# Patient Record
Sex: FEMALE | Race: BLACK OR AFRICAN AMERICAN | NOT HISPANIC OR LATINO | Employment: FULL TIME | URBAN - METROPOLITAN AREA
[De-identification: names, ages, dates, MRNs, and addresses within clinical notes are randomized per-mention and may not be internally consistent; named-entity substitution may affect disease eponyms.]

---

## 2018-06-27 ENCOUNTER — HOSPITAL ENCOUNTER (EMERGENCY)
Facility: HOSPITAL | Age: 28
Discharge: HOME | End: 2018-06-27
Attending: EMERGENCY MEDICINE | Admitting: EMERGENCY MEDICINE
Payer: COMMERCIAL

## 2018-06-27 VITALS
WEIGHT: 223.6 LBS | OXYGEN SATURATION: 98 % | DIASTOLIC BLOOD PRESSURE: 67 MMHG | BODY MASS INDEX: 35.93 KG/M2 | RESPIRATION RATE: 18 BRPM | HEART RATE: 66 BPM | TEMPERATURE: 97.2 F | SYSTOLIC BLOOD PRESSURE: 108 MMHG | HEIGHT: 66 IN

## 2018-06-27 DIAGNOSIS — R19.7 DIARRHEA: ICD-10-CM

## 2018-06-27 DIAGNOSIS — R11.10 VOMITING: Primary | ICD-10-CM

## 2018-06-27 LAB
ALBUMIN SERPL BCP-MCNC: 4.1 G/DL (ref 3–5.2)
ALP SERPL-CCNC: 77 U/L (ref 43–122)
ALT SERPL W P-5'-P-CCNC: 44 U/L (ref 9–52)
AMORPH URATE CRY URNS QL MICRO: ABNORMAL /HPF
ANION GAP SERPL CALCULATED.3IONS-SCNC: 7 MMOL/L (ref 5–14)
ANISOCYTOSIS BLD QL SMEAR: PRESENT
AST SERPL W P-5'-P-CCNC: 37 U/L (ref 14–36)
BACTERIA UR QL AUTO: ABNORMAL /HPF
BASOPHILS # BLD AUTO: 0 THOUSANDS/ΜL (ref 0–0.1)
BASOPHILS NFR BLD AUTO: 0 % (ref 0–1)
BILIRUB SERPL-MCNC: 0.3 MG/DL
BILIRUB UR QL STRIP: NEGATIVE
BUN SERPL-MCNC: 9 MG/DL (ref 5–25)
CALCIUM SERPL-MCNC: 8.6 MG/DL (ref 8.4–10.2)
CHLORIDE SERPL-SCNC: 107 MMOL/L (ref 97–108)
CLARITY UR: ABNORMAL
CO2 SERPL-SCNC: 26 MMOL/L (ref 22–30)
COLOR UR: ABNORMAL
CREAT SERPL-MCNC: 0.68 MG/DL (ref 0.6–1.2)
EOSINOPHIL # BLD AUTO: 0.1 THOUSAND/ΜL (ref 0–0.4)
EOSINOPHIL NFR BLD AUTO: 1 % (ref 0–6)
ERYTHROCYTE [DISTWIDTH] IN BLOOD BY AUTOMATED COUNT: 18.4 %
EXT PREG TEST URINE: NEGATIVE
GFR SERPL CREATININE-BSD FRML MDRD: 136 ML/MIN/1.73SQ M
GLUCOSE SERPL-MCNC: 90 MG/DL (ref 70–99)
GLUCOSE UR STRIP-MCNC: NEGATIVE MG/DL
HCT VFR BLD AUTO: 35.4 % (ref 36–46)
HGB BLD-MCNC: 11.1 G/DL (ref 12–16)
HGB UR QL STRIP.AUTO: NEGATIVE
HYPERCHROMIA BLD QL SMEAR: PRESENT
KETONES UR STRIP-MCNC: NEGATIVE MG/DL
LEUKOCYTE ESTERASE UR QL STRIP: NEGATIVE
LYMPHOCYTES # BLD AUTO: 2 THOUSANDS/ΜL (ref 0.5–4)
LYMPHOCYTES NFR BLD AUTO: 31 % (ref 20–50)
MCH RBC QN AUTO: 20.7 PG (ref 26–34)
MCHC RBC AUTO-ENTMCNC: 31.2 G/DL (ref 31–36)
MCV RBC AUTO: 66 FL (ref 80–100)
MONOCYTES # BLD AUTO: 0.7 THOUSAND/ΜL (ref 0.2–0.9)
MONOCYTES NFR BLD AUTO: 11 % (ref 1–10)
MUCOUS THREADS UR QL AUTO: ABNORMAL
NEUTROPHILS # BLD AUTO: 3.6 THOUSANDS/ΜL (ref 1.8–7.8)
NEUTS SEG NFR BLD AUTO: 57 % (ref 45–65)
NITRITE UR QL STRIP: NEGATIVE
NON-SQ EPI CELLS URNS QL MICRO: ABNORMAL /HPF
PH UR STRIP.AUTO: 6 [PH] (ref 4.5–8)
PLATELET # BLD AUTO: 491 THOUSANDS/UL (ref 150–450)
PLATELET BLD QL SMEAR: ADEQUATE
PMV BLD AUTO: 7.1 FL (ref 8.9–12.7)
POTASSIUM SERPL-SCNC: 3.7 MMOL/L (ref 3.6–5)
PROT SERPL-MCNC: 7.7 G/DL (ref 5.9–8.4)
PROT UR STRIP-MCNC: ABNORMAL MG/DL
RBC # BLD AUTO: 5.35 MILLION/UL (ref 4–5.2)
RBC #/AREA URNS AUTO: ABNORMAL /HPF
RBC MORPH BLD: PRESENT
SODIUM SERPL-SCNC: 140 MMOL/L (ref 137–147)
SP GR UR STRIP.AUTO: 1.02 (ref 1–1.04)
UROBILINOGEN UA: NEGATIVE MG/DL
WBC # BLD AUTO: 6.4 THOUSAND/UL (ref 4.5–11)
WBC #/AREA URNS AUTO: ABNORMAL /HPF

## 2018-06-27 PROCEDURE — 81001 URINALYSIS AUTO W/SCOPE: CPT | Performed by: PHYSICIAN ASSISTANT

## 2018-06-27 PROCEDURE — 99283 EMERGENCY DEPT VISIT LOW MDM: CPT

## 2018-06-27 PROCEDURE — 80053 COMPREHEN METABOLIC PANEL: CPT | Performed by: PHYSICIAN ASSISTANT

## 2018-06-27 PROCEDURE — 96361 HYDRATE IV INFUSION ADD-ON: CPT

## 2018-06-27 PROCEDURE — 85025 COMPLETE CBC W/AUTO DIFF WBC: CPT | Performed by: PHYSICIAN ASSISTANT

## 2018-06-27 PROCEDURE — 36415 COLL VENOUS BLD VENIPUNCTURE: CPT | Performed by: PHYSICIAN ASSISTANT

## 2018-06-27 PROCEDURE — 96374 THER/PROPH/DIAG INJ IV PUSH: CPT

## 2018-06-27 PROCEDURE — 81025 URINE PREGNANCY TEST: CPT | Performed by: PHYSICIAN ASSISTANT

## 2018-06-27 RX ORDER — ONDANSETRON 2 MG/ML
INJECTION INTRAMUSCULAR; INTRAVENOUS
Status: COMPLETED
Start: 2018-06-27 | End: 2018-06-27

## 2018-06-27 RX ORDER — ONDANSETRON 2 MG/ML
4 INJECTION INTRAMUSCULAR; INTRAVENOUS ONCE
Status: COMPLETED | OUTPATIENT
Start: 2018-06-27 | End: 2018-06-27

## 2018-06-27 RX ORDER — DICYCLOMINE HYDROCHLORIDE 10 MG/1
20 CAPSULE ORAL ONCE
Status: COMPLETED | OUTPATIENT
Start: 2018-06-27 | End: 2018-06-27

## 2018-06-27 RX ORDER — DICYCLOMINE HYDROCHLORIDE 10 MG/1
CAPSULE ORAL
Status: COMPLETED
Start: 2018-06-27 | End: 2018-06-27

## 2018-06-27 RX ORDER — ONDANSETRON 4 MG/1
4 TABLET, ORALLY DISINTEGRATING ORAL EVERY 6 HOURS PRN
Qty: 20 TABLET | Refills: 0 | Status: SHIPPED | OUTPATIENT
Start: 2018-06-27 | End: 2018-11-23

## 2018-06-27 RX ADMIN — DICYCLOMINE HYDROCHLORIDE 20 MG: 10 CAPSULE ORAL at 12:36

## 2018-06-27 RX ADMIN — SODIUM CHLORIDE 1000 ML: 9 INJECTION, SOLUTION INTRAVENOUS at 12:33

## 2018-06-27 RX ADMIN — ONDANSETRON 4 MG: 2 INJECTION, SOLUTION INTRAMUSCULAR; INTRAVENOUS at 13:04

## 2018-06-27 RX ADMIN — ONDANSETRON 4 MG: 2 INJECTION INTRAMUSCULAR; INTRAVENOUS at 12:35

## 2018-06-27 RX ADMIN — ONDANSETRON 4 MG: 2 INJECTION, SOLUTION INTRAMUSCULAR; INTRAVENOUS at 12:35

## 2018-06-27 RX ADMIN — ONDANSETRON 4 MG: 2 INJECTION INTRAMUSCULAR; INTRAVENOUS at 13:04

## 2018-06-27 NOTE — DISCHARGE INSTRUCTIONS
Acute Nausea and Vomiting, Ambulatory Care   GENERAL INFORMATION:   Acute nausea and vomiting  starts suddenly, gets worse quickly, and lasts a short time  Nausea and vomiting may be caused by pregnancy, alcohol, infection, or medicines  Common related symptoms include the following:   · Fever    · Abdominal swelling    · Pain, tenderness, or a lump in the abdomen    · Splashing sounds heard in your stomach when you move  Seek immediate care for the following symptoms:   · Blood in your vomit or bowel movements    · Sudden, severe pain in your chest and upper abdomen after hard vomiting    · Dizziness, dry mouth, and thirst    · Urinating very little or not at all    · Muscle weakness, leg cramps, and trouble breathing    · A heart beat that is faster than normal    · Vomiting for more than 48 hours  Treatment for acute nausea and vomiting  may include medicines to calm your stomach and stop the vomiting  You may need IV fluids if you are dehydrated  Manage your nausea and vomiting:   · Drink liquids as directed to prevent dehydration  Ask how much liquid to drink each day and which liquids are best for you  You may need to drink an oral rehydration solution (ORS)  ORS contains water, salts, and sugar that are needed to replace the lost body fluids  Ask what kind of ORS to use, how much to drink, and where to get it  · Eat smaller meals, more often  Eat small amounts of food every 2 to 3 hours, even if you are not hungry  Food in your stomach may help decrease your nausea  · Avoid stress  Find ways to relax and manage your stress  Headaches due to stress may cause nausea and vomiting  Get more rest and sleep  Follow up with your healthcare provider as directed:  Write down your questions so you remember to ask them during your visits  CARE AGREEMENT:   You have the right to help plan your care  Learn about your health condition and how it may be treated   Discuss treatment options with your caregivers to decide what care you want to receive  You always have the right to refuse treatment  The above information is an  only  It is not intended as medical advice for individual conditions or treatments  Talk to your doctor, nurse or pharmacist before following any medical regimen to see if it is safe and effective for you  © 2014 [address] is for End User's use only and may not be sold, redistributed or otherwise used for commercial purposes  All illustrations and images included in CareNotes® are the copyrighted property of [employer]  or Ramses Beckham  Acute Diarrhea   WHAT YOU NEED TO KNOW:   Acute diarrhea starts quickly and lasts a short time, usually 1 to 3 days  It can last up to 2 weeks  You may not be able to control your diarrhea  Acute diarrhea usually stops on its own  DISCHARGE INSTRUCTIONS:   Return to the emergency department if:   · You feel confused  · Your heartbeat is faster than normal      · Your eyes look deeply sunken, or you have no tears when you cry  · You urinate less than usual, or your urine is dark yellow  · You have blood or mucus in your stools  · You have severe abdominal pain  · You are unable to drink any liquids  Contact your healthcare provider if:   · Your symptoms do not get better with treatment  · You have a fever higher than 101 3°F (38 5°C)  · You have trouble eating and drinking because you are vomiting  · You are thirsty or have a dry mouth  · Your diarrhea does not get better in 7 days  · You have questions or concerns about your condition or care  Follow up with your healthcare provider as directed:  Write down your questions so you remember to ask them during your visits  Medicines:  · Diarrhea medicine  is an over-the-counter medicine that helps slow or stop your diarrhea   If you take other medicines, talk to your healthcare provider before you take diarrhea medicine  · Antibiotics  may be given to help treat an infection caused by bacteria  · Antiparasitics  may be given to treat an infection caused by parasites  · Take your medicine as directed  Contact your healthcare provider if you think your medicine is not helping or if you have side effects  Tell him of her if you are allergic to any medicine  Keep a list of the medicines, vitamins, and herbs you take  Include the amounts, and when and why you take them  Bring the list or the pill bottles to follow-up visits  Carry your medicine list with you in case of an emergency  Self-care:   · Drink liquids as directed  Liquids will help prevent dehydration caused by diarrhea  Ask your healthcare provider how much liquid to drink each day and which liquids are best for you  You may need to drink an oral rehydration solution (ORS)  An ORS has the right amounts of water, salts, and sugar you need to replace body fluids  You can buy an ORS at most grocery stores and pharmacies  · Eat foods that are easy to digest   Examples include rice, lentils, cereal, bananas, potatoes, and bread  It also includes some fruits (bananas, melon), well-cooked vegetables, and lean meats  Avoid foods high in fiber, fat, and sugar  Also avoid caffeine, alcohol, dairy, and red meat until your diarrhea is gone  Prevent acute diarrhea:   · Wash your hands often  Use soap and water  Wash your hands before you eat or prepare food  Also wash your hands after you use the bathroom  Use an alcohol-based hand gel when soap and water are not available  · Keep bathroom surfaces clean  This helps prevent the spread of germs that cause acute diarrhea  · Wash fruits and vegetables well before you eat them  This can help remove germs that cause diarrhea  If possible, remove the skin from fruits and vegetables, or cook them well before you eat them  · Cook meat as directed        ¨ Cook ground meat  to 160°F      [employer] poultry, whole poultry, or cuts of poultry  to at least 165°F  Remove the meat from heat  Let it stand for 3 minutes before you eat it  ¨ Cook whole cuts of meat other than poultry  to at least 145°F  Remove the meat from heat  Let it stand for 3 minutes before you eat it  · Wash dishes that have touched raw meat with hot water and soap  This includes cutting boards, utensils, dishes, and serving containers  · Place raw or cooked meat in the refrigerator as soon as possible  Bacteria can grow in meat that is left at room temperature too long  · Do not eat raw or undercooked oysters, clams, or mussels  These foods may be contaminated and cause infection  · Drink filtered or treated water only when you travel  Do not put ice in your drinks  Drink bottled water whenever possible  © 2017 [address] Information is for End User's use only and may not be sold, redistributed or otherwise used for commercial purposes  All illustrations and images included in CareNotes® are the copyrighted property of [employer]  or Ramses Beckham  The above information is an  only  It is not intended as medical advice for individual conditions or treatments  Talk to your doctor, nurse or pharmacist before following any medical regimen to see if it is safe and effective for you

## 2018-06-27 NOTE — ED PROVIDER NOTES
History  Chief Complaint   Patient presents with    Vomiting     vomiting and diarrhea for 1 week, vomited 3 times today and nonstop diarrhea since 0600 today, c/o of fever and chills with lower abd pain bilateral with headache     26 yo F presenting with multiple episodes of vomiting and diarrhea x 1 week  She reports trying Pepto bismol and Mylanta at home without relief  She reports she has been trying to drink clear liquids and soup at home without any relief either  She reports she has 'not been able to keep anything down'  She denies any foul smell of the stool and describes it as 'very watery'  She also describes diffuse cramping in the abdomen during episodes of diarrhea  Pt denies any recent abx, new foods, new restaurants or travel  Pt denies any hemoptysis, hematemesis, hematechezia or melena  Pt denies any dysuria, hematuria, vaginal bleeding or vaginal discharge, CP or SOB, fevers chills or night sweats  She denies any abdominal surgeries  None       History reviewed  No pertinent past medical history  History reviewed  No pertinent surgical history  No family history on file  I have reviewed and agree with the history as documented  Social History   Substance Use Topics    Smoking status: Never Smoker    Smokeless tobacco: Never Used    Alcohol use Yes      Comment: socially        Review of Systems   All other systems reviewed and are negative  Physical Exam  Physical Exam   Constitutional: She is oriented to person, place, and time  She appears well-developed and well-nourished  No distress  Appears well and is talkative at bedside   HENT:   Head: Normocephalic  Eyes: Conjunctivae and EOM are normal    Neck: Normal range of motion  Neck supple  Cardiovascular: Normal rate  Pulmonary/Chest: Effort normal    Abdominal: Soft     Hyper active bowel sounds, no RLQ tenderness to palpation, no LLQ tenderness to palpation, no epigastric tenderness to palpation, no scars visualized   Musculoskeletal: Normal range of motion  Neurological: She is alert and oriented to person, place, and time  Skin: Skin is warm and dry  Capillary refill takes less than 2 seconds  She is not diaphoretic  Psychiatric: She has a normal mood and affect  Her behavior is normal    Nursing note and vitals reviewed        Vital Signs  ED Triage Vitals [06/27/18 1137]   Temperature Pulse Respirations Blood Pressure SpO2   (!) 97 2 °F (36 2 °C) 93 18 163/99 97 %      Temp Source Heart Rate Source Patient Position - Orthostatic VS BP Location FiO2 (%)   Temporal Right Sitting Left arm --      Pain Score       7           Vitals:    06/27/18 1137 06/27/18 1306   BP: 163/99 108/67   Pulse: 93 66   Patient Position - Orthostatic VS: Sitting Lying       Visual Acuity      ED Medications  Medications   ondansetron (ZOFRAN) injection 4 mg (4 mg Intravenous Given 6/27/18 1235)   sodium chloride 0 9 % bolus 1,000 mL (0 mL Intravenous Stopped 6/27/18 1309)   dicyclomine (BENTYL) capsule 20 mg (20 mg Oral Given 6/27/18 1236)   ondansetron (ZOFRAN) injection 4 mg (4 mg Intravenous Given 6/27/18 1304)       Diagnostic Studies  Results Reviewed     Procedure Component Value Units Date/Time    Urine Microscopic [[id number]]  (Abnormal) Collected:  06/27/18 1241    Lab Status:  Final result Specimen:  Urine from Urine, Other Updated:  06/27/18 1328     RBC, UA 0-1 (A) /hpf      WBC, UA 0-1 (A) /hpf      Epithelial Cells Moderate (A) /hpf      Bacteria, UA Moderate (A) /hpf      AMORPH URATES Innumerable /hpf      MUCOUS THREADS Moderate (A)    UA w Reflex to Microscopic [[id number]]  (Abnormal) Collected:  06/27/18 1241    Lab Status:  Final result Specimen:  Urine from Urine, Other Updated:  06/27/18 1302     Color, UA Diana (A)     Clarity, UA Cloudy (A)     Specific Gravity, UA 1 025     pH, UA 6 0     Leukocytes, UA Negative     Nitrite, UA Negative     Protein, UA 15 (Trace) (A) mg/dl      Glucose, UA Negative mg/dl      Ketones, UA Negative mg/dl      Bilirubin, UA Negative     Blood, UA Negative     UROBILINOGEN UA Negative mg/dL     Comprehensive metabolic panel [[id number]]  (Abnormal) Collected:  06/27/18 1215    Lab Status:  Final result Specimen:  Blood from Arm, Right Updated:  06/27/18 1247     Sodium 140 mmol/L      Potassium 3 7 mmol/L      Chloride 107 mmol/L      CO2 26 mmol/L      Anion Gap 7 mmol/L      BUN 9 mg/dL      Creatinine 0 68 mg/dL      Glucose 90 mg/dL      Calcium 8 6 mg/dL      AST 37 (H) U/L      ALT 44 U/L      Alkaline Phosphatase 77 U/L      Total Protein 7 7 g/dL      Albumin 4 1 g/dL      Total Bilirubin 0 30 mg/dL      eGFR 136 ml/min/1 73sq m     Narrative:         National Kidney Disease Education Program recommendations are as follows:  GFR calculation is accurate only with a steady state creatinine  Chronic Kidney disease less than 60 ml/min/1 73 sq  meters  Kidney failure less than 15 ml/min/1 73 sq  meters      POCT pregnancy, urine [[id number]]  (Normal) Resulted:  06/27/18 1244    Lab Status:  Final result Updated:  06/27/18 1244     EXT PREG TEST UR (Ref: Negative) Negative    CBC and differential [[id number]]  (Abnormal) Collected:  06/27/18 1215    Lab Status:  Final result Specimen:  Blood from Arm, Right Updated:  06/27/18 1234     WBC 6 40 Thousand/uL      RBC 5 35 (H) Million/uL      Hemoglobin 11 1 (L) g/dL      Hematocrit 35 4 (L) %      MCV 66 (L) fL      MCH 20 7 (L) pg      MCHC 31 2 g/dL      RDW 18 4 (H) %      MPV 7 1 (L) fL      Platelets 748 (H) Thousands/uL      Neutrophils Relative 57 %      Lymphocytes Relative 31 %      Monocytes Relative 11 (H) %      Eosinophils Relative 1 %      Basophils Relative 0 %      Neutrophils Absolute 3 60 Thousands/µL      Lymphocytes Absolute 2 00 Thousands/µL      Monocytes Absolute 0 70 Thousand/µL      Eosinophils Absolute 0 10 Thousand/µL      Basophils Absolute 0 00 Thousands/µL                  No orders to display Procedures  Procedures       Phone Contacts  ED Phone Contact    ED Course  ED Course as of Jun 27 1342   Wed Jun 27, 2018   1303 Pt nausea slightly improved after 4mg zofran, will give 4 mg more and reassess, otherwise appears healthy, nontoxic, nondiaphoretic, lying comfortably in bed    1337 Pt feeling better after second dosing of zofran, is asking to go home                                MDM  Number of Diagnoses or Management Options  Diarrhea:   Vomiting:   Diagnosis management comments: Pt improved with zofran and bentyl, no electrolyte abnormality or increased WBC, all labs discussed with pt, pt appears nontoxic and well, pt given strict RTED precautions, pt understands and is agreeable to plan, all questions answered, stable for d/c    CritCare Time    Disposition  Final diagnoses:   Vomiting   Diarrhea     Time reflects when diagnosis was documented in both MDM as applicable and the Disposition within this note     Time User Action Codes Description Comment    6/27/2018  1:40 PM Santos Vela Add [R11 10] Vomiting     6/27/2018  1:40 PM Amy BORRERO Add [R19 7] Diarrhea       ED Disposition     ED Disposition Condition Comment    Discharge  [address] discharge to home/self care      Condition at discharge: Good        Follow-up Information     Follow up With Specialties Details Why [facility]   [address] [place] [address]  [id number]  [address] [facility]   [address] [facility] [place]  [address] [address]  [phone]          Patient's Medications   Discharge Prescriptions    ONDANSETRON (ZOFRAN-ODT) 4 MG DISINTEGRATING TABLET    Take 1 tablet (4 mg total) by mouth every 6 (six) hours as needed for nausea or vomiting       Start Date: 6/27/2018 End Date: --       Order Dose: 4 mg       Quantity: 20 tablet    Refills: 0     No discharge procedures on file      ED Provider  Electronically Signed by           Tez Valentin PA-C  06/27/18 2989

## 2018-07-27 ENCOUNTER — HOSPITAL ENCOUNTER (EMERGENCY)
Facility: HOSPITAL | Age: 28
Discharge: HOME | End: 2018-07-27
Attending: EMERGENCY MEDICINE | Admitting: EMERGENCY MEDICINE
Payer: COMMERCIAL

## 2018-07-27 VITALS
OXYGEN SATURATION: 96 % | RESPIRATION RATE: 16 BRPM | SYSTOLIC BLOOD PRESSURE: 122 MMHG | TEMPERATURE: 97.2 F | DIASTOLIC BLOOD PRESSURE: 80 MMHG | BODY MASS INDEX: 36.38 KG/M2 | WEIGHT: 225.38 LBS | HEART RATE: 95 BPM

## 2018-07-27 DIAGNOSIS — M72.2 PLANTAR FASCIITIS OF RIGHT FOOT: Primary | ICD-10-CM

## 2018-07-27 PROCEDURE — 99283 EMERGENCY DEPT VISIT LOW MDM: CPT

## 2018-07-27 RX ORDER — NAPROXEN 500 MG/1
500 TABLET ORAL 2 TIMES DAILY WITH MEALS
Qty: 20 TABLET | Refills: 0 | Status: SHIPPED | OUTPATIENT
Start: 2018-07-27 | End: 2018-11-23

## 2018-07-27 RX ORDER — NAPROXEN 500 MG/1
500 TABLET ORAL 2 TIMES DAILY WITH MEALS
Qty: 20 TABLET | Refills: 0 | OUTPATIENT
Start: 2018-07-27

## 2018-07-27 NOTE — ED PROVIDER NOTES
History  Chief Complaint   Patient presents with    Foot Pain     Stand a lot at work and yesterday got a cramp on the bottom of right foot and the pain is worse today  Hurts to stand and walk on it  28-year-old otherwise healthy female presents to the emergency department for evaluation of acute onset right plantar foot pain beginning last night after a long shift work  She states she stands frequently throughout the day at work, however denies any associated trauma  Pain increased when attempting to ambulate upon awakening this morning  No history of similar pain  Denies associated fever, chills, sweats  Has not taken any medication for pain control  Pain is moderate, nonradicular, and intermittent  Prior to Admission Medications   Prescriptions Last Dose Informant Patient Reported? Taking?   ondansetron (ZOFRAN-ODT) 4 mg disintegrating tablet   No No   Sig: Take 1 tablet (4 mg total) by mouth every 6 (six) hours as needed for nausea or vomiting      Facility-Administered Medications: None       History reviewed  No pertinent past medical history  History reviewed  No pertinent surgical history  History reviewed  No pertinent family history  I have reviewed and agree with the history as documented  Social History   Substance Use Topics    Smoking status: Never Smoker    Smokeless tobacco: Never Used    Alcohol use No      Comment: socially        Review of Systems   Constitutional: Negative for chills, diaphoresis and fever  Musculoskeletal: Negative for arthralgias, joint swelling and myalgias  (+) R foot pain   Skin: Negative for color change and wound  Neurological: Negative for weakness and numbness  Physical Exam  Physical Exam   Constitutional: She is oriented to person, place, and time  She appears well-developed and well-nourished  No distress  HENT:   Head: Normocephalic and atraumatic  Eyes: Pupils are equal, round, and reactive to light   No scleral icterus  Cardiovascular: Normal rate and regular rhythm  Exam reveals no gallop and no friction rub  No murmur heard  Pulmonary/Chest: No respiratory distress  She has no wheezes  She has no rales  Musculoskeletal:        Right ankle: She exhibits normal range of motion  No tenderness  Right foot: There is tenderness (plantar calcaneus/plantar fascia)  There is normal range of motion, no bony tenderness, no swelling and no crepitus  Gait normal w/o limp   Neurological: She is alert and oriented to person, place, and time  Skin: Skin is dry  Capillary refill takes less than 2 seconds  She is not diaphoretic  Psychiatric: She has a normal mood and affect  Her behavior is normal    Vitals reviewed  Vital Signs  ED Triage Vitals [07/27/18 0951]   Temperature Pulse Respirations Blood Pressure SpO2   (!) 97 2 °F (36 2 °C) 95 16 122/80 96 %      Temp Source Heart Rate Source Patient Position - Orthostatic VS BP Location FiO2 (%)   Temporal Monitor Sitting Left arm --      Pain Score       8           Vitals:    07/27/18 0951   BP: 122/80   Pulse: 95   Patient Position - Orthostatic VS: Sitting       Visual Acuity      ED Medications  Medications - No data to display    Diagnostic Studies  Results Reviewed     None                 No orders to display              Procedures  Procedures       Phone Contacts  ED Phone Contact    ED Course                               MDM  Number of Diagnoses or Management Options  Plantar fasciitis of right foot: new and does not require workup  Diagnosis management comments: 75-year-old female presents emergency department classic plantar fasciitis symptoms  She has no acute trauma thus imaging is not warranted  Counseled patient regarding use of NSAIDs for pain control, therapeutic remedies at home and PCP follow-up         Amount and/or Complexity of Data Reviewed  Review and summarize past medical records: yes      CritCare Time    Disposition  Final diagnoses:   Plantar fasciitis of right foot     Time reflects when diagnosis was documented in both MDM as applicable and the Disposition within this note     Time User Action Codes Description Comment    7/27/2018  9:55 AM Amanda Sun Add [M72 2] Plantar fasciitis of right foot       ED Disposition     ED Disposition Condition Comment    Discharge  Excell Mulling discharge to home/self care  Condition at discharge: Good        Follow-up Information     Follow up With Specialties Details Why Contact Info    Kaylee Pollack DO [address] [address]  [phone]            Patient's Medications   Discharge Prescriptions    NAPROXEN (NAPROSYN) 500 MG TABLET    Take 1 tablet (500 mg total) by mouth 2 (two) times a day with meals       Start Date: 7/27/2018 End Date: --       Order Dose: 500 mg       Quantity: 20 tablet    Refills: 0     No discharge procedures on file      ED Provider  Electronically Signed by           Kala Perdomo PA-C  07/27/18 1663

## 2018-07-27 NOTE — DISCHARGE INSTRUCTIONS
Plantar Fasciitis   WHAT YOU NEED TO KNOW:   Rest, ice, and foot supports can help your plantar fascia heal  You may need medicines to decrease swelling and pain  A physical therapist can teach you exercises to help improve movement and strength, and to decrease pain  DISCHARGE INSTRUCTIONS:   Contact your healthcare provider or podiatrist if:   · Your pain and swelling increase  · You develop knee, hip, or back pain  · You have questions or concerns about your condition or care  Medicines: You may  need any of the following:  · Acetaminophen  decreases pain and fever  It is available without a doctor's order  Ask how much to give your child and how often to give it  Follow directions  Read the labels of all other medicines your child uses to see if they also contain acetaminophen, or ask your child's doctor or pharmacist  Acetaminophen can cause liver damage if not taken correctly  · NSAIDs , such as ibuprofen, help decrease swelling, pain, and fever  NSAIDs can cause stomach bleeding or kidney problems in certain people  If you take blood thinner medicine, always ask your healthcare provider if NSAIDs are safe for you  Always read the medicine label and follow directions  · Take your medicine as directed  Contact your healthcare provider if you think your medicine is not helping or if you have side effects  Tell him of her if you are allergic to any medicine  Keep a list of the medicines, vitamins, and herbs you take  Include the amounts, and when and why you take them  Bring the list or the pill bottles to follow-up visits  Carry your medicine list with you in case of an emergency  Self-care:   · Wear your splint or shoe inserts as directed  You may need to wear a splint at night to keep your foot stretched while you sleep  This will help prevent sharp pain first thing in the morning  Shoe inserts will help decrease stress on your plantar fascia when you walk or exercise       · Rest as directed  Rest as much as possible to decrease swelling and prevent more damage  Ask your healthcare provider when you can return to your normal activities  · Apply ice on your plantar fascia  Ice helps prevent tissue damage and decreases swelling and pain  Fill a water bottle with water and freeze it  Wrap a towel around the bottle or cover it with a pillow case  Roll the water bottle under your foot for 10 minutes in the morning and after work  · Massage your plantar fascia as directed  This may help decrease swelling and pain  Roll a golf ball under your foot for 10 minutes  Repeat 3 times each day  · Go to physical therapy as directed  A physical therapist teaches you exercises to help improve movement and strength, and to decrease pain  Prevent plantar fasciitis:   · Maintain a healthy weight  This will help decrease stress on your feet  Ask your healthcare provider how much you should weigh  Ask him to help you create a weight loss plan if you are overweight  · Do low-impact exercises  Low-impact exercises decrease stress on your plantar fascia  Examples include swimming or bicycling  · Start new activities slowly  Increase the intensity and time gradually  · Wear shoes that fit well and support your arch  Replace your shoes before the padding or shock absorption wears out  Do not walk or  bare feet or sandals for long periods of time  · Stretch before you exercise  Ask your healthcare provider how to stretch your plantar fascia and calf muscles  Follow up with your healthcare provider or podiatrist as directed:  Write down your questions so you remember to ask them during your visits  © 2017 [address] Information is for End User's use only and may not be sold, redistributed or otherwise used for commercial purposes  All illustrations and images included in CareNotes® are the copyrighted property of [employer] M [employer]  or Ramses Beckham    The above information is an  only  It is not intended as medical advice for individual conditions or treatments  Talk to your doctor, nurse or pharmacist before following any medical regimen to see if it is safe and effective for you

## 2018-07-27 NOTE — ED NOTES
This RN never seen patient    PA assessed and discharged patient     Eliane Mortimer, RN  07/27/18 [address] [facility] Charo Kay RN  07/27/18 0650

## 2018-11-23 ENCOUNTER — HOSPITAL ENCOUNTER (EMERGENCY)
Facility: HOSPITAL | Age: 28
Discharge: HOME | End: 2018-11-23
Attending: EMERGENCY MEDICINE
Payer: COMMERCIAL

## 2018-11-23 VITALS
TEMPERATURE: 97.1 F | DIASTOLIC BLOOD PRESSURE: 91 MMHG | SYSTOLIC BLOOD PRESSURE: 158 MMHG | BODY MASS INDEX: 37.93 KG/M2 | RESPIRATION RATE: 18 BRPM | HEIGHT: 66 IN | WEIGHT: 236 LBS | OXYGEN SATURATION: 100 % | HEART RATE: 91 BPM

## 2018-11-23 DIAGNOSIS — J06.9 URI (UPPER RESPIRATORY INFECTION): Primary | ICD-10-CM

## 2018-11-23 PROCEDURE — 94640 AIRWAY INHALATION TREATMENT: CPT

## 2018-11-23 PROCEDURE — 99283 EMERGENCY DEPT VISIT LOW MDM: CPT

## 2018-11-23 RX ORDER — BENZONATATE 100 MG/1
100 CAPSULE ORAL EVERY 8 HOURS
Qty: 21 CAPSULE | Refills: 0 | OUTPATIENT
Start: 2018-11-23

## 2018-11-23 RX ORDER — LORATADINE 10 MG/1
10 TABLET ORAL DAILY
Qty: 20 TABLET | Refills: 0 | Status: SHIPPED | OUTPATIENT
Start: 2018-11-23 | End: 2019-02-15

## 2018-11-23 RX ORDER — ALBUTEROL SULFATE 90 UG/1
2 AEROSOL, METERED RESPIRATORY (INHALATION) EVERY 4 HOURS PRN
Qty: 1 INHALER | Refills: 0 | Status: SHIPPED | OUTPATIENT
Start: 2018-11-23 | End: 2019-02-15

## 2018-11-23 RX ORDER — IPRATROPIUM BROMIDE AND ALBUTEROL SULFATE 2.5; .5 MG/3ML; MG/3ML
3 SOLUTION RESPIRATORY (INHALATION)
Status: DISCONTINUED | OUTPATIENT
Start: 2018-11-23 | End: 2018-11-23 | Stop reason: HOSPADM

## 2018-11-23 RX ORDER — ALBUTEROL SULFATE 90 UG/1
2 AEROSOL, METERED RESPIRATORY (INHALATION) EVERY 4 HOURS PRN
Qty: 1 INHALER | Refills: 0 | OUTPATIENT
Start: 2018-11-23

## 2018-11-23 RX ORDER — BENZONATATE 100 MG/1
100 CAPSULE ORAL EVERY 8 HOURS
Qty: 21 CAPSULE | Refills: 0 | Status: SHIPPED | OUTPATIENT
Start: 2018-11-23 | End: 2019-02-15

## 2018-11-23 RX ORDER — FLUTICASONE PROPIONATE 50 MCG
1 SPRAY, SUSPENSION (ML) NASAL DAILY
Qty: 16 G | Refills: 0 | OUTPATIENT
Start: 2018-11-23

## 2018-11-23 RX ORDER — FLUTICASONE PROPIONATE 50 MCG
1 SPRAY, SUSPENSION (ML) NASAL DAILY
Qty: 16 G | Refills: 0 | Status: SHIPPED | OUTPATIENT
Start: 2018-11-23 | End: 2019-02-15

## 2018-11-23 RX ADMIN — IPRATROPIUM BROMIDE AND ALBUTEROL SULFATE 3 ML: 2.5; .5 SOLUTION RESPIRATORY (INHALATION) at 10:06

## 2018-11-23 NOTE — ED PROVIDER NOTES
History  Chief Complaint   Patient presents with    Cough     I have had a cough, nasal congestion and a sore throat  I also vomit, only after I eat  I missed work today  78-year-old female presents with complaint of nasal congestion, runny nose, ears popping, and mild sore throat  She has had a cough with one episode of posttussive vomiting  She denies any fevers or other GI symptoms  Symptoms have been fluctuating over the past several days  She has been doing home remedies and taking some over-the-counter medications including Mucinex  She was sent here because she had attempted to return to work today and was still having difficulty with exertion due to shortness of breath, chest tightness, and borderline wheezing  She denies a known history of asthma but reports that family members have severe asthma and on occasion she will have similar type symptoms  URI   Presenting symptoms: congestion, cough, ear pain, rhinorrhea and sore throat    Presenting symptoms: no facial pain, no fatigue and no fever    Severity:  Moderate  Onset quality:  Gradual  Timing:  Constant  Progression:  Waxing and waning  Relieved by:  Nothing  Worsened by:  Nothing  Ineffective treatments:  OTC medications  Associated symptoms: sneezing    Associated symptoms: no arthralgias, no headaches, no myalgias, no neck pain, no sinus pain, no swollen glands and no wheezing        None       History reviewed  No pertinent past medical history  History reviewed  No pertinent surgical history  History reviewed  No pertinent family history  I have reviewed and agree with the history as documented  Social History   Substance Use Topics    Smoking status: Never Smoker    Smokeless tobacco: Never Used    Alcohol use No      Comment: socially        Review of Systems   Constitutional: Negative for appetite change, chills, fatigue and fever     HENT: Positive for congestion, ear pain, postnasal drip, rhinorrhea, sneezing and sore throat  Negative for drooling, ear discharge, sinus pain, trouble swallowing and voice change  Eyes: Negative for redness and itching  Respiratory: Positive for cough and chest tightness  Negative for shortness of breath and wheezing  Cardiovascular: Negative for chest pain and leg swelling  Gastrointestinal: Negative for abdominal pain, constipation, diarrhea, nausea and vomiting  Endocrine: Negative  Genitourinary: Negative for difficulty urinating and dysuria  Musculoskeletal: Negative for arthralgias, back pain, myalgias and neck pain  Skin: Negative for rash  Allergic/Immunologic: Negative  Neurological: Negative for dizziness, numbness and headaches  Hematological: Negative  Psychiatric/Behavioral: Negative  Physical Exam  Physical Exam   Constitutional: She is oriented to person, place, and time  She appears well-developed and well-nourished  HENT:   Head: Normocephalic and atraumatic  Right Ear: Tympanic membrane and ear canal normal    Left Ear: Tympanic membrane and ear canal normal    Nose: Mucosal edema and rhinorrhea present  Mouth/Throat: Uvula is midline, oropharynx is clear and moist and mucous membranes are normal  No oropharyngeal exudate, posterior oropharyngeal edema or tonsillar abscesses  Eyes: Pupils are equal, round, and reactive to light  Conjunctivae and EOM are normal    Neck: Normal range of motion  Neck supple  Cardiovascular: Normal rate, regular rhythm and normal heart sounds  Pulmonary/Chest: Effort normal and breath sounds normal  No respiratory distress  She has no wheezes  Abdominal: Soft  Bowel sounds are normal  There is no tenderness  There is no guarding  Musculoskeletal: She exhibits no edema, tenderness or deformity  Neurological: She is alert and oriented to person, place, and time  Skin: Skin is warm and dry  Capillary refill takes less than 2 seconds  No rash noted     Psychiatric: She has a normal mood and affect  Her behavior is normal    Nursing note and vitals reviewed  Vital Signs  ED Triage Vitals [11/23/18 0945]   Temperature Pulse Respirations Blood Pressure SpO2   (!) 97 1 °F (36 2 °C) 91 18 158/91 100 %      Temp Source Heart Rate Source Patient Position - Orthostatic VS BP Location FiO2 (%)   Tymp Core Monitor Sitting Left arm --      Pain Score       6           Vitals:    11/23/18 0945   BP: 158/91   Pulse: 91   Patient Position - Orthostatic VS: Sitting       Visual Acuity      ED Medications  Medications   ipratropium-albuterol (DUO-NEB) 0 5-2 5 mg/3 mL inhalation solution 3 mL (3 mL Nebulization Given 11/23/18 1006)       Diagnostic Studies  Results Reviewed     None                 No orders to display              Procedures  Procedures       Phone Contacts  ED Phone Contact    ED Course                               MDM  Number of Diagnoses or Management Options  URI (upper respiratory infection):   Diagnosis management comments: 59-year-old female presents with URI symptoms over the past several days  On exam she has nasal congestion and mild cough  Her symptoms improved somewhat after a DuoNeb treatment  Discussed supportive care measures and expected clinical course  She is aware need for outpatient follow-up along with reasons to return to the ER  CritCare Time    Disposition  Final diagnoses:   URI (upper respiratory infection)     Time reflects when diagnosis was documented in both MDM as applicable and the Disposition within this note     Time User Action Codes Description Comment    11/23/2018 10:00 AM Regan Santillan [J06 [id number]] URI (upper respiratory infection)       ED Disposition     ED Disposition Condition Comment    Discharge  Nini Son discharge to home/self care      Condition at discharge: Good        Follow-up Information     Follow up With Specialties Details Why Tanisha 89, DO Family Medicine   [address] [address]  [phone]            Patient's Medications   Discharge Prescriptions    ALBUTEROL (PROVENTIL HFA,VENTOLIN HFA) 90 MCG/ACT INHALER    Inhale 2 puffs every 4 (four) hours as needed for wheezing       Start Date: 11/23/2018End Date: --       Order Dose: 2 puffs       Quantity: 1 Inhaler    Refills: 0    BENZONATATE (TESSALON PERLES) 100 MG CAPSULE    Take 1 capsule (100 mg total) by mouth every 8 (eight) hours       Start Date: 11/23/2018End Date: --       Order Dose: 100 mg       Quantity: 21 capsule    Refills: 0    FLUTICASONE (FLONASE) 50 MCG/ACT NASAL SPRAY    1 spray into each nostril daily       Start Date: 11/23/2018End Date: --       Order Dose: 1 spray       Quantity: 16 g    Refills: 0    LORATADINE (CLARITIN) 10 MG TABLET    Take 1 tablet (10 mg total) by mouth daily       Start Date: 11/23/2018End Date: --       Order Dose: 10 mg       Quantity: 20 tablet    Refills: 0     No discharge procedures on file      ED Provider  Electronically Signed by           Scott Velazquez DO  11/23/18 1108

## 2018-11-23 NOTE — DISCHARGE INSTRUCTIONS
Upper Respiratory Infection, Ambulatory Care   GENERAL INFORMATION:   An upper respiratory infection  is also called a common cold  It can affect your nose, throat, ears, and sinuses  Common symptoms include the following:   · Runny or stuffy nose    · Sneezing and coughing    · Sore throat or hoarseness    · Red, watery, and sore eyes    · Tiredness or restlessness    · Chills and fever    · Headache, body aches, or sore muscles  Seek immediate care for the following symptoms:   · Headaches or a stiff neck    · Bright lights hurt your eyes    · Chest pain or trouble breathing  Treatment for an upper respiratory infection  may include any of the following:  · Decongestants  help decrease nasal congestion and improve your breathing  Do not use decongestant sprays for more than a few days  · Cough suppressants  help decrease coughing  Ask your healthcare provider which type of cough medicine is best for you  Some cough medicines need a doctor's order  · NSAIDs  help decrease swelling and pain or fever  This medicine is available with or without a doctor's order  NSAIDs can cause stomach bleeding or kidney problems in certain people  If you take blood thinner medicine, always ask your healthcare provider if NSAIDs are safe for you  Always read the medicine label and follow directions  Care for an upper respiratory infection:   · Rest  until your fever is gone or you feel better  · Drink liquids as directed to prevent dehydration  You may need to drink 8 to 10 cups of liquid each day  Good liquids to drink include water, ginger ale, tea, or fruit juices  · Gargle  with warm salt water to help your sore throat feel better  Mix ¼ teaspoon salt with 1 cup warm water  You may also suck on hard candy or throat lozenges  · Saline nasal drops  help loosen your nasal congestion  They can be bought without a doctor's order      · Take a warm bath or shower  to help decrease body aches and help you breathe easier  · Use a cool-mist humidifier  to increase air moisture and make it easier for you to breathe  Prevent the spread of germs:   · Avoid others for the first 2 to 3 days of your cold  Germs are easily spread during this time  · Do not share food, drinks,  towels, or personal items with others  · Wash your hands often  Use soap and water  Wash your hands after you use the bathroom, change a child's diapers, or sneeze  Wash your hands before you prepare or eat food  Cover your mouth and nose with a tissue when you sneeze or cough  Follow up with your healthcare provider as directed:  Write down your questions so you remember to ask them during your visits  CARE AGREEMENT:   You have the right to help plan your care  Learn about your health condition and how it may be treated  Discuss treatment options with your caregivers to decide what care you want to receive  You always have the right to refuse treatment  The above information is an  only  It is not intended as medical advice for individual conditions or treatments  Talk to your doctor, nurse or pharmacist before following any medical regimen to see if it is safe and effective for you  © 2014 [address] is for End User's use only and may not be sold, redistributed or otherwise used for commercial purposes  All illustrations and images included in CareNotes® are the copyrighted property of [employer]  or [employer]ham

## 2019-01-07 ENCOUNTER — APPOINTMENT (EMERGENCY)
Dept: RADIOLOGY | Facility: HOSPITAL | Age: 29
End: 2019-01-07
Payer: COMMERCIAL

## 2019-01-07 ENCOUNTER — HOSPITAL ENCOUNTER (EMERGENCY)
Facility: HOSPITAL | Age: 29
Discharge: HOME | End: 2019-01-07
Attending: EMERGENCY MEDICINE | Admitting: EMERGENCY MEDICINE
Payer: COMMERCIAL

## 2019-01-07 VITALS
RESPIRATION RATE: 18 BRPM | DIASTOLIC BLOOD PRESSURE: 85 MMHG | OXYGEN SATURATION: 100 % | HEART RATE: 84 BPM | TEMPERATURE: 98.2 F | BODY MASS INDEX: 37.82 KG/M2 | WEIGHT: 234.3 LBS | SYSTOLIC BLOOD PRESSURE: 132 MMHG

## 2019-01-07 DIAGNOSIS — S92.535A CLOSED NONDISPLACED FRACTURE OF DISTAL PHALANX OF LESSER TOE OF LEFT FOOT, INITIAL ENCOUNTER: Primary | ICD-10-CM

## 2019-01-07 PROCEDURE — 73630 X-RAY EXAM OF FOOT: CPT

## 2019-01-07 PROCEDURE — 99283 EMERGENCY DEPT VISIT LOW MDM: CPT

## 2019-01-07 RX ORDER — ACETAMINOPHEN 325 MG/1
650 TABLET ORAL ONCE
Status: COMPLETED | OUTPATIENT
Start: 2019-01-07 | End: 2019-01-07

## 2019-01-07 RX ADMIN — ACETAMINOPHEN 650 MG: 325 TABLET ORAL at 21:06

## 2019-01-08 NOTE — ED PROVIDER NOTES
History  Chief Complaint   Patient presents with    Toe Injury     pt  reports injury to left 3rd toe x 2 days     Patient is a previously healthy 40-year-old female who states that on Saturday she was walking down the steps and fell down the last 2 steps of her with sterile  Patient reports she did not fall hit her head have neck or back pain  Patient reports she did bend her toes forward into hyperflexion in states that since Saturday she has had pain, ecchymosis, swelling in her 3rd toe of the left toe  Patient reports he has experienced swelling in the left foot as well along with increased pain on bearing weight  Patient reports she is not taking any medications at home to try and alleviate the pain  Patient reports she does have some numbness and tingling extending into the 3rd toe however was able to feel when provider palpated each toe  Patient has decreased range of motion secondary to pain  History provided by:  Patient  Foot Injury - Major   Location:  Foot and toe  Time since incident:  2 days  Injury: yes    Mechanism of injury: fall    Fall:     Height of fall:  2 steps    Impact surface:  Hard floor    Entrapped after fall: no    Foot location:  L foot  Toe location:  L third toe  Pain details:     Quality:  Aching    Radiates to:  Does not radiate    Severity:  Moderate    Onset quality:  Gradual    Duration:  2 days    Timing:  Constant    Progression:  Unchanged  Chronicity:  New  Dislocation: no    Foreign body present:  No foreign bodies  Prior injury to area:  No  Relieved by:  None tried  Worsened by:  Bearing weight  Ineffective treatments:  None tried  Associated symptoms: decreased ROM    Associated symptoms: no fatigue and no fever        Prior to Admission Medications   Prescriptions Last Dose Informant Patient Reported? Taking?    albuterol (PROVENTIL HFA,VENTOLIN HFA) 90 mcg/act inhaler   No No   Sig: Inhale 2 puffs every 4 (four) hours as needed for wheezing   benzonatate (TESSALON PERLES) 100 mg capsule   No No   Sig: Take 1 capsule (100 mg total) by mouth every 8 (eight) hours   fluticasone (FLONASE) 50 mcg/act nasal spray   No No   Si spray into each nostril daily   loratadine (CLARITIN) 10 mg tablet   No No   Sig: Take 1 tablet (10 mg total) by mouth daily      Facility-Administered Medications: None       History reviewed  No pertinent past medical history  History reviewed  No pertinent surgical history  History reviewed  No pertinent family history  I have reviewed and agree with the history as documented  Social History   Substance Use Topics    Smoking status: Never Smoker    Smokeless tobacco: Never Used    Alcohol use Yes      Comment: socially        Review of Systems   Constitutional: Negative for chills, fatigue and fever  HENT: Negative for congestion, ear pain, rhinorrhea and sore throat  Eyes: Negative for redness  Respiratory: Negative for chest tightness and shortness of breath  Cardiovascular: Negative for chest pain and palpitations  Gastrointestinal: Negative for abdominal pain, nausea and vomiting  Genitourinary: Negative for dysuria and hematuria  Musculoskeletal: Positive for arthralgias  Skin: Negative for rash  Neurological: Negative for dizziness, syncope, light-headedness and numbness  Physical Exam  Physical Exam   Constitutional: She is oriented to person, place, and time  She appears well-developed and well-nourished  HENT:   Head: Normocephalic  Eyes: No scleral icterus  Cardiovascular: Normal rate and regular rhythm  Pulmonary/Chest: Effort normal and breath sounds normal  No stridor  Abdominal: Soft  She exhibits no distension  There is no tenderness  Musculoskeletal: She exhibits tenderness  Left foot: There is decreased range of motion, tenderness and bony tenderness  There is no swelling, normal capillary refill and no crepitus          Feet:    Dorsalis pedis pulse intact 2+ Neurological: She is alert and oriented to person, place, and time  Skin: Skin is warm and dry  Capillary refill takes less than 2 seconds  Psychiatric: She has a normal mood and affect  Nursing note and vitals reviewed  Vital Signs  ED Triage Vitals [01/07/19 2041]   Temperature Pulse Respirations Blood Pressure SpO2   98 2 °F (36 8 °C) 84 18 132/85 100 %      Temp Source Heart Rate Source Patient Position - Orthostatic VS BP Location FiO2 (%)   Tympanic -- -- -- --      Pain Score       Worst Possible Pain           Vitals:    01/07/19 2041   BP: 132/85   Pulse: 84       Visual Acuity      ED Medications  Medications   acetaminophen (TYLENOL) tablet 650 mg (650 mg Oral Given 1/7/19 2106)       Diagnostic Studies  Results Reviewed     None                 XR foot 3+ views LEFT   ED Interpretation by Xiang Alejo PA-C (01/07 2121)   Distal phalanx fracture noted to 4th toe on the lateral aspect  Procedures  Static Splint Application  Date/Time: 1/7/2019 9:21 PM  Performed by: Yanet Hernandez  Authorized by: Yanet Hernandez     Procedure performed by emergency physician: No    Other Assisting Provider: No    Consent:     Consent obtained:  Verbal    Consent given by:  Patient    Risks discussed:  Pain and swelling    Alternatives discussed:  No treatment  Universal protocol:     Procedure explained and questions answered to patient or proxy's satisfaction: yes      Patient identity confirmed:  Verbally with patient  Indication:     Indications: fracture    Pre-procedure details:     Sensation:  Normal    Skin color:  Ecchymotic  Procedure details:     Laterality:  Left    Location:  Toe    Toe location: Patient's toes federico-taped to the toes surrounding it  Strapping: yes      Splint type: Pre fabricated orthopedic shoe      Supplies:  Prefabricated splint  Post-procedure details:     Pain:  Unchanged    Sensation:  Normal    Neurovascular Exam: skin pink      Patient tolerance of procedure: Tolerated well, no immediate complications           Phone Contacts  ED Phone Contact    ED Course                               [facility]  CritCare Time    Disposition  Final diagnoses:   Closed nondisplaced fracture of distal phalanx of lesser toe of left foot, initial encounter     Time reflects when diagnosis was documented in both MDM as applicable and the Disposition within this note     Time User Action Codes Description Comment    1/7/2019  9:25 PM JS Cavazos [address] Closed nondisplaced fracture of distal phalanx of lesser toe of left foot, initial encounter       ED Disposition     ED Disposition Condition Comment    Discharge  [address] discharge to home/self care  Condition at discharge: Good        Follow-up Information     Follow up With Specialties Details Why Contact Info    Cira Bucio MD Orthopedic Surgery Schedule an appointment as soon as possible for a visit  [address] Family Medicine Schedule an appointment as soon as possible for a visit  Bari Barr [address]  [phone]            Patient's Medications   Discharge Prescriptions    No medications on file     No discharge procedures on file      ED Provider  Electronically Signed by           Morena Rivera PA-C  01/07/19 1817

## 2019-01-08 NOTE — DISCHARGE INSTRUCTIONS
Foot Fracture in Adults   WHAT YOU NEED TO KNOW:   A foot fracture is a break in one or more of the bones in your foot  Foot fractures are commonly caused by trauma, falls, or repeated stress injuries  DISCHARGE INSTRUCTIONS:   Medicines:   · Antibiotics: This medicine is given to help treat or prevent an infection caused by bacteria  · NSAIDs:  These medicines decrease swelling and pain  NSAIDs are available without a doctor's order  Ask which medicine is right for you  Ask how much to take and when to take it  Take as directed  NSAIDs can cause stomach bleeding and kidney problems if not taken correctly  · Pain medicine: You may be given a prescription medicine to decrease pain  Do not wait until the pain is severe before you take this medicine  · Take your medicine as directed  Contact your healthcare provider if you think your medicine is not helping or if you have side effects  Tell him of her if you are allergic to any medicine  Keep a list of the medicines, vitamins, and herbs you take  Include the amounts, and when and why you take them  Bring the list or the pill bottles to follow-up visits  Carry your medicine list with you in case of an emergency  Follow up with your healthcare provider or bone specialist as directed: You may need to return to have your splint or stitches removed  You may also need to return for tests to make sure your foot is healing  Write down your questions so you remember to ask them during your visits  Wound care:  Carefully wash the wound with soap and water  Dry the area and put on new, clean bandages as directed  Change your bandages when they get wet or dirty  Self-care:   · Rest:  You may need to rest your foot and avoid activities that cause pain  For stress fractures, you will need to avoid the activity that caused the fracture until it heals  Ask when you can return to your normal activities such as work and sports      · Ice:  Ice helps decrease swelling and pain  Ice may also help prevent tissue damage  Use an ice pack or put crushed ice in a plastic bag  Cover it with a towel, and place it on your foot for 15 to 20 minutes every hour as directed  · Elevate your foot:  Raise your foot at or above the level of your heart as often as you can  This will help decrease swelling and pain  Prop your foot on pillows or blankets to keep it elevated comfortably  · Physical therapy: Once your foot has healed, a physical therapist can teach you exercises to help improve movement and strength, and to decrease pain  Splint care:   · Check the skin around your splint daily for any redness or open areas  · Do not use a sharp or pointed object to scratch your skin under the splint  · Do not remove your splint unless your healthcare provider or orthopedic surgeon says it is okay  Bathing with a splint:  Do not let your splint get wet  Before bathing, cover the splint with a plastic bag  Tape the bag to your skin above the splint to seal out the water  Keep your foot out of the water in case the bag leaks  Ask when it is okay to take a bath or shower  Assistive devices: You may be given a hard-soled shoe to wear while your foot is healing  You also may need to use crutches to help you walk while your foot heals  It is important to use your crutches correctly  Ask for more information about how to use crutches  Contact your healthcare provider or bone specialist if:   · You have a fever  · You have new sores around your boot or splint  · You have new or worsening trouble moving your foot  · You notice a foul smell coming from under your splint  · Your boot or splint gets damaged  · You have questions or concerns about your condition or care  Return to the emergency department if:   · The pain in your injured foot gets worse even after you rest and take pain medicine      · The skin or toes of your foot become numb, swollen, cold, white, or blue     · You have more pain or swelling than you did before the splint was put on  · Your wound is draining fluid or pus  · Blood soaks through your bandage  · Your leg feels warm, tender, and painful  It may look swollen and red  · You suddenly feel lightheaded and short of breath  · You have chest pain when you take a deep breath or cough  You may cough up blood  © 2017 [address]  Information is for End User's use only and may not be sold, redistributed or otherwise used for commercial purposes  All illustrations and images included in CareNotes® are the copyrighted property of A [employer] M [employer] Inc  or [employer]  The above information is an  only  It is not intended as medical advice for individual conditions or treatments  Talk to your doctor, nurse or pharmacist before following any medical regimen to see if it is safe and effective for you

## 2019-01-12 ENCOUNTER — HOSPITAL ENCOUNTER (OUTPATIENT)
Facility: HOSPITAL | Age: 29
Setting detail: OBSERVATION
LOS: 1 days | Discharge: HOME | End: 2019-01-14
Attending: OBSTETRICS & GYNECOLOGY | Admitting: OBSTETRICS & GYNECOLOGY
Payer: COMMERCIAL

## 2019-01-12 ENCOUNTER — HOSPITAL ENCOUNTER (EMERGENCY)
Facility: HOSPITAL | Age: 29
End: 2019-01-12
Attending: EMERGENCY MEDICINE | Admitting: EMERGENCY MEDICINE
Payer: COMMERCIAL

## 2019-01-12 ENCOUNTER — APPOINTMENT (EMERGENCY)
Dept: CT IMAGING | Facility: HOSPITAL | Age: 29
End: 2019-01-12
Payer: COMMERCIAL

## 2019-01-12 ENCOUNTER — APPOINTMENT (EMERGENCY)
Dept: ULTRASOUND IMAGING | Facility: HOSPITAL | Age: 29
End: 2019-01-12
Payer: COMMERCIAL

## 2019-01-12 VITALS
SYSTOLIC BLOOD PRESSURE: 139 MMHG | TEMPERATURE: 96.6 F | RESPIRATION RATE: 20 BRPM | OXYGEN SATURATION: 98 % | WEIGHT: 235 LBS | HEART RATE: 83 BPM | DIASTOLIC BLOOD PRESSURE: 73 MMHG | BODY MASS INDEX: 37.93 KG/M2

## 2019-01-12 DIAGNOSIS — D21.9 FIBROIDS: ICD-10-CM

## 2019-01-12 DIAGNOSIS — R10.2 PELVIC PAIN: Primary | ICD-10-CM

## 2019-01-12 DIAGNOSIS — N70.11 HYDROSALPINX: ICD-10-CM

## 2019-01-12 LAB
ALBUMIN SERPL BCP-MCNC: 4.4 G/DL (ref 3–5.2)
ALP SERPL-CCNC: 70 U/L (ref 43–122)
ALT SERPL W P-5'-P-CCNC: 21 U/L (ref 9–52)
ANION GAP SERPL CALCULATED.3IONS-SCNC: 11 MMOL/L (ref 5–14)
AST SERPL W P-5'-P-CCNC: 30 U/L (ref 14–36)
B-HCG SERPL-ACNC: <3 MIU/ML
BASOPHILS # BLD AUTO: 0.1 THOUSANDS/ΜL (ref 0–0.1)
BASOPHILS NFR BLD AUTO: 1 % (ref 0–1)
BILIRUB SERPL-MCNC: 0.5 MG/DL
BILIRUB UR QL STRIP: NEGATIVE
BUN SERPL-MCNC: 10 MG/DL (ref 5–25)
CALCIUM SERPL-MCNC: 8.9 MG/DL (ref 8.4–10.2)
CHLORIDE SERPL-SCNC: 104 MMOL/L (ref 97–108)
CLARITY UR: CLEAR
CO2 SERPL-SCNC: 24 MMOL/L (ref 22–30)
COLOR UR: NORMAL
CREAT SERPL-MCNC: 0.53 MG/DL (ref 0.6–1.2)
EOSINOPHIL # BLD AUTO: 0.2 THOUSAND/ΜL (ref 0–0.4)
EOSINOPHIL NFR BLD AUTO: 2 % (ref 0–6)
ERYTHROCYTE [DISTWIDTH] IN BLOOD BY AUTOMATED COUNT: 18.5 %
GFR SERPL CREATININE-BSD FRML MDRD: 147 ML/MIN/1.73SQ M
GLUCOSE SERPL-MCNC: 105 MG/DL (ref 70–99)
GLUCOSE UR STRIP-MCNC: NEGATIVE MG/DL
HCT VFR BLD AUTO: 33.3 % (ref 36–46)
HGB BLD-MCNC: 10 G/DL (ref 12–16)
HGB UR QL STRIP.AUTO: NEGATIVE
HYPERCHROMIA BLD QL SMEAR: PRESENT
KETONES UR STRIP-MCNC: NEGATIVE MG/DL
LEUKOCYTE ESTERASE UR QL STRIP: NEGATIVE
LIPASE SERPL-CCNC: 45 U/L (ref 23–300)
LYMPHOCYTES # BLD AUTO: 2.4 THOUSANDS/ΜL (ref 0.5–4)
LYMPHOCYTES NFR BLD AUTO: 25 % (ref 25–45)
MCH RBC QN AUTO: 19.2 PG (ref 26–34)
MCHC RBC AUTO-ENTMCNC: 29.9 G/DL (ref 31–36)
MCV RBC AUTO: 64 FL (ref 80–100)
MONOCYTES # BLD AUTO: 0.6 THOUSAND/ΜL (ref 0.2–0.9)
MONOCYTES NFR BLD AUTO: 6 % (ref 1–10)
NEUTROPHILS # BLD AUTO: 6.5 THOUSANDS/ΜL (ref 1.8–7.8)
NEUTS SEG NFR BLD AUTO: 67 % (ref 45–65)
NITRITE UR QL STRIP: NEGATIVE
PH UR STRIP.AUTO: 8 [PH] (ref 4.5–8)
PLATELET # BLD AUTO: 483 THOUSANDS/UL (ref 150–450)
PLATELET BLD QL SMEAR: ABNORMAL
PMV BLD AUTO: 7 FL (ref 8.9–12.7)
POTASSIUM SERPL-SCNC: 4 MMOL/L (ref 3.6–5)
PROT SERPL-MCNC: 8.4 G/DL (ref 5.9–8.4)
PROT UR STRIP-MCNC: NEGATIVE MG/DL
RBC # BLD AUTO: 5.19 MILLION/UL (ref 4–5.2)
RBC MORPH BLD: ABNORMAL
SODIUM SERPL-SCNC: 139 MMOL/L (ref 137–147)
SP GR UR STRIP.AUTO: 1.01 (ref 1–1.04)
UROBILINOGEN UA: NEGATIVE MG/DL
WBC # BLD AUTO: 9.8 THOUSAND/UL (ref 4.5–11)

## 2019-01-12 PROCEDURE — 99223 1ST HOSP IP/OBS HIGH 75: CPT | Performed by: OBSTETRICS & GYNECOLOGY

## 2019-01-12 PROCEDURE — 74176 CT ABD & PELVIS W/O CONTRAST: CPT

## 2019-01-12 PROCEDURE — 84702 CHORIONIC GONADOTROPIN TEST: CPT | Performed by: PHYSICIAN ASSISTANT

## 2019-01-12 PROCEDURE — 96376 TX/PRO/DX INJ SAME DRUG ADON: CPT

## 2019-01-12 PROCEDURE — 87480 CANDIDA DNA DIR PROBE: CPT | Performed by: PHYSICIAN ASSISTANT

## 2019-01-12 PROCEDURE — 87660 TRICHOMONAS VAGIN DIR PROBE: CPT | Performed by: PHYSICIAN ASSISTANT

## 2019-01-12 PROCEDURE — 87510 GARDNER VAG DNA DIR PROBE: CPT | Performed by: PHYSICIAN ASSISTANT

## 2019-01-12 PROCEDURE — 76856 US EXAM PELVIC COMPLETE: CPT

## 2019-01-12 PROCEDURE — 85025 COMPLETE CBC W/AUTO DIFF WBC: CPT | Performed by: PHYSICIAN ASSISTANT

## 2019-01-12 PROCEDURE — 96374 THER/PROPH/DIAG INJ IV PUSH: CPT

## 2019-01-12 PROCEDURE — 81003 URINALYSIS AUTO W/O SCOPE: CPT | Performed by: PHYSICIAN ASSISTANT

## 2019-01-12 PROCEDURE — 36415 COLL VENOUS BLD VENIPUNCTURE: CPT | Performed by: PHYSICIAN ASSISTANT

## 2019-01-12 PROCEDURE — G0379 DIRECT REFER HOSPITAL OBSERV: HCPCS

## 2019-01-12 PROCEDURE — 96361 HYDRATE IV INFUSION ADD-ON: CPT

## 2019-01-12 PROCEDURE — 76830 TRANSVAGINAL US NON-OB: CPT

## 2019-01-12 PROCEDURE — 96375 TX/PRO/DX INJ NEW DRUG ADDON: CPT

## 2019-01-12 PROCEDURE — 80053 COMPREHEN METABOLIC PANEL: CPT | Performed by: PHYSICIAN ASSISTANT

## 2019-01-12 PROCEDURE — 99285 EMERGENCY DEPT VISIT HI MDM: CPT

## 2019-01-12 PROCEDURE — 83690 ASSAY OF LIPASE: CPT | Performed by: PHYSICIAN ASSISTANT

## 2019-01-12 RX ORDER — HYDROMORPHONE HCL 110MG/55ML
2 PATIENT CONTROLLED ANALGESIA SYRINGE INTRAVENOUS EVERY 6 HOURS PRN
Status: DISCONTINUED | OUTPATIENT
Start: 2019-01-12 | End: 2019-01-13

## 2019-01-12 RX ORDER — HYDROMORPHONE HCL/PF 1 MG/ML
1 SYRINGE (ML) INJECTION ONCE
Status: COMPLETED | OUTPATIENT
Start: 2019-01-12 | End: 2019-01-12

## 2019-01-12 RX ORDER — ONDANSETRON 2 MG/ML
4 INJECTION INTRAMUSCULAR; INTRAVENOUS EVERY 6 HOURS PRN
Status: DISCONTINUED | OUTPATIENT
Start: 2019-01-12 | End: 2019-01-14 | Stop reason: HOSPADM

## 2019-01-12 RX ORDER — MORPHINE SULFATE 4 MG/ML
4 INJECTION, SOLUTION INTRAMUSCULAR; INTRAVENOUS ONCE
Status: COMPLETED | OUTPATIENT
Start: 2019-01-12 | End: 2019-01-12

## 2019-01-12 RX ORDER — KETOROLAC TROMETHAMINE 30 MG/ML
30 INJECTION, SOLUTION INTRAMUSCULAR; INTRAVENOUS ONCE
Status: COMPLETED | OUTPATIENT
Start: 2019-01-12 | End: 2019-01-12

## 2019-01-12 RX ORDER — SODIUM CHLORIDE 9 MG/ML
250 INJECTION, SOLUTION INTRAVENOUS CONTINUOUS
Status: DISCONTINUED | OUTPATIENT
Start: 2019-01-12 | End: 2019-01-12 | Stop reason: HOSPADM

## 2019-01-12 RX ORDER — ONDANSETRON 2 MG/ML
4 INJECTION INTRAMUSCULAR; INTRAVENOUS ONCE
Status: COMPLETED | OUTPATIENT
Start: 2019-01-12 | End: 2019-01-12

## 2019-01-12 RX ORDER — INDOMETHACIN 50 MG/1
50 CAPSULE ORAL
Status: DISCONTINUED | OUTPATIENT
Start: 2019-01-13 | End: 2019-01-14 | Stop reason: HOSPADM

## 2019-01-12 RX ORDER — INDOMETHACIN 50 MG/1
100 CAPSULE ORAL ONCE
Status: COMPLETED | OUTPATIENT
Start: 2019-01-12 | End: 2019-01-12

## 2019-01-12 RX ADMIN — MORPHINE SULFATE 4 MG: 4 INJECTION INTRAVENOUS at 13:25

## 2019-01-12 RX ADMIN — HYDROMORPHONE HYDROCHLORIDE 1 MG: 1 INJECTION, SOLUTION INTRAMUSCULAR; INTRAVENOUS; SUBCUTANEOUS at 18:33

## 2019-01-12 RX ADMIN — ONDANSETRON 4 MG: 2 INJECTION, SOLUTION INTRAMUSCULAR; INTRAVENOUS at 13:24

## 2019-01-12 RX ADMIN — MORPHINE SULFATE 4 MG: 4 INJECTION INTRAVENOUS at 16:42

## 2019-01-12 RX ADMIN — KETOROLAC TROMETHAMINE 30 MG: 30 INJECTION, SOLUTION INTRAMUSCULAR; INTRAVENOUS at 13:46

## 2019-01-12 RX ADMIN — INDOMETHACIN 100 MG: 50 CAPSULE ORAL at 23:22

## 2019-01-12 RX ADMIN — SODIUM CHLORIDE 250 ML/HR: 9 INJECTION, SOLUTION INTRAVENOUS at 13:25

## 2019-01-12 NOTE — ED NOTES
Patient c/o pain to right pelvic area  PA made aware    Awaiting new orders     Adi Soares, TU  01/12/19 1640

## 2019-01-12 NOTE — EMTALA/ACUTE CARE TRANSFER
[facility] EMERGENCY DEPARTMENT  [address] [facility] [address]  [phone]  Dept: [phone]      EMTALA TRANSFER CONSENT    NAME George King                                         1990                              MRN [id number]    I have been informed of my rights regarding examination, treatment, and transfer   by Dr Marilee Kitchen MD    Benefits: Continuity of care, Patient preference    Risks: Potential for delay in receiving treatment, Potential deterioration of medical condition, Loss of IV, Increased discomfort during transfer, Possible worsening of condition or death during transfer      Consent for Transfer:  I acknowledge that my medical condition has been evaluated and explained to me by the emergency department physician or other qualified medical person and/or my attending physician, who has recommended that I be transferred to the service of  Accepting Physician: Dr Evelia Carty  at [address] [place] [address] Name, [facility] : [facility]   The above potential benefits of such transfer, the potential risks associated with such transfer, and the probable risks of not being transferred have been explained to me, and I fully understand them  The doctor has explained that, in my case, the benefits of transfer outweigh the risks  I agree to be transferred  I authorize the performance of emergency medical procedures and treatments upon me in both transit and upon arrival at the receiving facility  Additionally, I authorize the release of any and all medical records to the receiving facility and request they be transported with me, if possible  I understand that the safest mode of transportation during a medical emergency is an ambulance and that the Hospital advocates the use of this mode of transport   Risks of traveling to the receiving facility by car, including absence of medical control, life sustaining equipment, such as oxygen, and medical personnel has been explained to me and I fully understand them  (RADHA CORRECT BOX BELOW)  [  ]  I consent to the stated transfer and to be transported by ambulance/helicopter  [  ]  I consent to the stated transfer, but refuse transportation by ambulance and accept full responsibility for my transportation by car  I understand the risks of non-ambulance transfers and I exonerate the Hospital and its staff from any deterioration in my condition that results from this refusal     X___________________________________________    DATE  19  TIME________  Signature of patient or legally responsible individual signing on patient behalf           RELATIONSHIP TO PATIENT_________________________          Provider Certification    NAME Peace Stovall                                         1990                              MRN [id number]    A medical screening exam was performed on the above named patient  Based on the examination:    Condition Necessitating Transfer The primary encounter diagnosis was Pelvic pain  Diagnoses of Hydrosalpinx and Fibroids were also pertinent to this visit      Patient Condition: The patient has been stabilized such that within reasonable medical probability, no material deterioration of the patient condition or the condition of the unborn child(corinna) is likely to result from the transfer    Reason for Transfer: Level of Care needed not available at this facility    Transfer Requirements: Facility [facility]LA    · Space available and qualified personnel available for treatment as acknowledged by    · Agreed to accept transfer and to provide appropriate medical treatment as acknowledged by       Dr Debbi Meek   · Appropriate medical records of the examination and treatment of the patient are provided at the time of transfer   [address], [address] _______  · Transfer will be performed by qualified personnel from    and appropriate transfer equipment as required, including the use of necessary and appropriate life support measures  Provider Certification: I have examined the patient and explained the following risks and benefits of being transferred/refusing transfer to the patient/family:  General risk, such as traffic hazards, adverse weather conditions, rough terrain or turbulence, possible failure of equipment (including vehicle or aircraft), or consequences of actions of persons outside the control of the transport personnel      Based on these reasonable risks and benefits to the patient and/or the unborn child(corinna), and based upon the information available at the time of the patients examination, I certify that the medical benefits reasonably to be expected from the provision of appropriate medical treatments at another medical facility outweigh the increasing risks, if any, to the individuals medical condition, and in the case of labor to the unborn child, from effecting the transfer      X____________________________________________ DATE 01/12/19        TIME_______      ORIGINAL - SEND TO MEDICAL RECORDS   COPY - SEND WITH PATIENT DURING TRANSFER

## 2019-01-12 NOTE — ED CARE HANDOFF
Emergency Department Sign Out Note        ED Course / Workup Pending (followup):     47-year-old female presents emergency department signed out to myself by previous provider  Diffuse abdominal did pain and discomfort in the pelvic area  CT scan and ultrasound performed shows no evidence of torsion but noted large fibroids large uterus as well as a hydrosalpinx  Could be the cause the patient's symptoms patient needed further evaluation over at [facility] consult and admission over to [facility] under the gyn service for noted worsening pain  The patient agrees for plan transfer in an ambulance at this point time  ED Course as of Jan 12 1954   Sat Jan 12, 2019   [facility] with pacs for transfer significant amount of pain will need observation overnight in place with the patient conceived a gyn consult  80 Spoke with Demarcus Rico, Dr Sekou Victor accepted the pt  Procedures  MDM  CritCare Time      Disposition  Final diagnoses:   Pelvic pain   Hydrosalpinx   Fibroids     Time reflects when diagnosis was documented in both MDM as applicable and the Disposition within this note     Time User Action Codes Description Comment    1/12/2019  6:38 PM Ava Carrington Add [R10 2] Pelvic pain     1/12/2019  6:38 PM Ava Garden Add [N70 11] Hydrosalpinx     1/12/2019  6:39 PM Ava Carrington Add [D21 9] Fibroids       ED Disposition     ED Disposition Condition Comment    Transfer to Another [address] should be transferred out to gyn ST jovita ruggiero MD Documentation      Most Recent Value   Patient Condition  The patient has been stabilized such that within reasonable medical probability, no material deterioration of the patient condition or the condition of the unborn child(corinna) is likely to result from the transfer   Reason for Transfer  Level of Care needed not available at this facility   Benefits of Transfer  Continuity of care, Patient preference   Risks of Transfer  Potential for delay in receiving treatment, Potential deterioration of medical condition, Loss of IV, Increased discomfort during transfer, Possible worsening of condition or death during transfer   Accepting Physician  Dr Rashid Falk Name, [address] [facility] MD  [facility]   Provider Certification  General risk, such as traffic hazards, adverse weather conditions, rough terrain or turbulence, possible failure of equipment (including vehicle or aircraft), or consequences of actions of persons outside the control of the transport personnel      RN Documentation      [address] Name, [address]   [facility]       Follow-up Information    None       Patient's Medications   Discharge Prescriptions    No medications on file     No discharge procedures on file         ED Provider  Electronically Signed by     Blanco Neri DO  01/12/19 1954

## 2019-01-12 NOTE — ED PROVIDER NOTES
History  Chief Complaint   Patient presents with    Abdominal Pain     c/o of waking with pain in rt  flank area that now radiates into rt  side of abdomen - c/o of vomiting        History provided by:  Patient   used: No    Medical Problem   Location:  Pt with right flank right pelvis pain  onset 6am    pt with nausea and vomiting  Severity:  Moderate  Onset quality:  Gradual  Duration:  5 hours  Timing:  Constant  Progression:  Unchanged  Chronicity:  New  Associated symptoms: no abdominal pain, no chest pain, no congestion, no cough, no diarrhea, no ear pain, no fatigue, no fever, no headaches, no loss of consciousness, no myalgias, no nausea, no rash, no rhinorrhea, no shortness of breath, no sore throat, no vomiting and no wheezing        Prior to Admission Medications   Prescriptions Last Dose Informant Patient Reported? Taking?    albuterol (PROVENTIL HFA,VENTOLIN HFA) 90 mcg/act inhaler   No No   Sig: Inhale 2 puffs every 4 (four) hours as needed for wheezing   benzonatate (TESSALON PERLES) 100 mg capsule   No No   Sig: Take 1 capsule (100 mg total) by mouth every 8 (eight) hours   fluticasone (FLONASE) 50 mcg/act nasal spray   No No   Si spray into each nostril daily   loratadine (CLARITIN) 10 mg tablet   No No   Sig: Take 1 tablet (10 mg total) by mouth daily      Facility-Administered Medications: None       Past Medical History:   Diagnosis Date    Asthma     Chickenpox     Seasonal allergies     Sleep apnea        Past Surgical History:   Procedure Laterality Date    NO PAST SURGERIES         Family History   Problem Relation Age of Onset    Colon cancer Father          age 40    Prostate cancer Father     Cirrhosis Father     Alcohol abuse Father     Sleep apnea Father     Cancer Maternal Grandmother     Other Mother         hysterectomy    No Known Problems Sister     No Known Problems Brother     Other Paternal Grandmother          during surgery    No Known Problems Brother     Breast cancer Neg Hx     Ovarian cancer Neg Hx      I have reviewed and agree with the history as documented  Social History   Substance Use Topics    Smoking status: Never Smoker    Smokeless tobacco: Never Used    Alcohol use Yes      Comment: h o alcohol abuse, very rare drinking since 2015        Review of Systems   Constitutional: Negative  Negative for fatigue and fever  HENT: Negative  Negative for congestion, ear pain, rhinorrhea and sore throat  Eyes: Negative  Respiratory: Negative  Negative for cough, shortness of breath and wheezing  Cardiovascular: Negative  Negative for chest pain  Gastrointestinal: Negative  Negative for abdominal pain, diarrhea, nausea and vomiting  Endocrine: Negative  Genitourinary: Negative  Musculoskeletal: Negative  Negative for myalgias  Skin: Negative  Negative for rash  Allergic/Immunologic: Negative  Neurological: Negative  Negative for loss of consciousness and headaches  Hematological: Negative  Psychiatric/Behavioral: Negative  All other systems reviewed and are negative  Physical Exam  Physical Exam   Constitutional: She is oriented to person, place, and time  She appears well-developed and well-nourished  HENT:   Head: Normocephalic  Right Ear: External ear normal    Left Ear: External ear normal    Nose: Nose normal    Mouth/Throat: Oropharynx is clear and moist    Eyes: Pupils are equal, round, and reactive to light  Conjunctivae and EOM are normal    Neck: Normal range of motion  Neck supple  Cardiovascular: Normal rate, regular rhythm and normal heart sounds  Pulmonary/Chest: Effort normal and breath sounds normal    Abdominal: Soft   Bowel sounds are normal    rlq right pelvis tender  +cva tender earlier none now    Genitourinary:   Genitourinary Comments: External exam wnl   Clear d/c  Right ovary tender to palp  Minor uterine tender no left ovary tender to palp Musculoskeletal: Normal range of motion  Neurological: She is alert and oriented to person, place, and time  Skin: Skin is warm  Psychiatric: She has a normal mood and affect  Her behavior is normal    Nursing note and vitals reviewed        Vital Signs  ED Triage Vitals   Temperature Pulse Respirations Blood Pressure SpO2   01/12/19 1256 01/12/19 1257 01/12/19 1257 01/12/19 1257 01/12/19 1257   (!) 96 6 °F (35 9 °C) 79 (!) 28 121/58 100 %      Temp Source Heart Rate Source Patient Position - Orthostatic VS BP Location FiO2 (%)   01/12/19 1256 01/12/19 1748 01/12/19 1257 01/12/19 1257 --   Tympanic Monitor Sitting Left arm       Pain Score       01/12/19 1325       Worst Possible Pain           Vitals:    01/12/19 1257 01/12/19 1748 01/12/19 2134   BP: 121/58 148/86 139/73   Pulse: 79 91 83   Patient Position - Orthostatic VS: Sitting Lying Lying       Visual Acuity      ED Medications  Medications   ondansetron (ZOFRAN) injection 4 mg (4 mg Intravenous Given 1/12/19 1324)   morphine (PF) 4 mg/mL injection 4 mg (4 mg Intravenous Given 1/12/19 1325)   ketorolac (TORADOL) injection 30 mg (30 mg Intravenous Given 1/12/19 1346)   morphine (PF) 4 mg/mL injection 4 mg (4 mg Intravenous Given 1/12/19 1642)   HYDROmorphone (DILAUDID) injection 1 mg (1 mg Intravenous Given 1/12/19 1833)       Diagnostic Studies  Results Reviewed     Procedure Component Value Units Date/Time    VAGINOSIS DNA PROBE (AFFIRM) [[id number]]  (Abnormal) Collected:  01/12/19 1518    Lab Status:  Final result Specimen:  Genital from Vaginal Updated:  01/15/19 1611     Candida Species Negative     Gardnerella vaginalis Positive (A)     Trichomonas vaginalis Negative    Narrative:       Performed at:  [address] [place] [address]  [id number]  : Lizabeth Schuster MD, Phone:  [phone]    UA w Reflex to Microscopic w Reflex to Culture [[id number]]  (Normal) Collected:  01/12/19 1415    Lab Status:  Final result Specimen:  Urine from Urine, Clean Catch Updated:  01/12/19 1426     Color, UA Straw     Clarity, UA Clear     Specific Gravity, UA 1 015     pH, UA 8 0     Leukocytes, UA Negative     Nitrite, UA Negative     Protein, UA Negative mg/dl      Glucose, UA Negative mg/dl      Ketones, UA Negative mg/dl      Bilirubin, UA Negative     Blood, UA Negative     UROBILINOGEN UA Negative mg/dL     hCG, quantitative [[id number]]  (Normal) Collected:  01/12/19 1325    Lab Status:  Final result Specimen:  Blood from Arm, Right Updated:  01/12/19 1411     HCG, Quant <3 mIU/mL     Narrative:         Pregnant Gestational Age           HCG Range (mIU/mL)  4 weeks                              44 - 6952  5 weeks                              348 - 35681        6 - 7 weeks                          975 - 670145  8 - 10 weeks                         68710 - 407485  11 - 12 weeks                        90509 - 534895  13 - 27 weeks (2nd Trimester)        7148 - 400636  28 - 40 weeks (3rd Trimester)        1531 - 596586                 Lipase [[id number]]  (Normal) Collected:  01/12/19 1325    Lab Status:  Final result Specimen:  Blood from Arm, Right Updated:  01/12/19 1348     Lipase 45 u/L     Narrative:       Hemolysis    Comprehensive metabolic panel [[id number]]  (Abnormal) Collected:  01/12/19 1325    Lab Status:  Final result Specimen:  Blood from Arm, Right Updated:  01/12/19 1347     Sodium 139 mmol/L      Potassium 4 0 mmol/L      Chloride 104 mmol/L      CO2 24 mmol/L      ANION GAP 11 mmol/L      BUN 10 mg/dL      Creatinine 0 53 (L) mg/dL      Glucose 105 (H) mg/dL      Calcium 8 9 mg/dL      AST 30 U/L      ALT 21 U/L      Alkaline Phosphatase 70 U/L      Total Protein 8 4 g/dL      Albumin 4 4 g/dL      Total Bilirubin 0 50 mg/dL      eGFR 147 ml/min/1 73sq m     Narrative:       Hemolysis  National Kidney Disease Education Program recommendations are as follows:  GFR calculation is accurate only with a steady state creatinine  Chronic Kidney disease less than 60 ml/min/1 73 sq  meters  Kidney failure less than 15 ml/min/1 73 sq  meters  CBC and differential [[id number]]  (Abnormal) Collected:  01/12/19 1325    Lab Status:  Final result Specimen:  Blood from Arm, Right Updated:  01/12/19 1334     WBC 9 80 Thousand/uL      RBC 5 19 Million/uL      Hemoglobin 10 0 (L) g/dL      Hematocrit 33 3 (L) %      MCV 64 (L) fL      MCH 19 2 (L) pg      MCHC 29 9 (L) g/dL      RDW 18 5 (H) %      MPV 7 0 (L) fL      Platelets 629 (H) Thousands/uL      Neutrophils Relative 67 (H) %      Lymphocytes Relative 25 %      Monocytes Relative 6 %      Eosinophils Relative 2 %      Basophils Relative 1 %      Neutrophils Absolute 6 50 Thousands/µL      Lymphocytes Absolute 2 40 Thousands/µL      Monocytes Absolute 0 60 Thousand/µL      Eosinophils Absolute 0 20 Thousand/µL      Basophils Absolute 0 10 Thousands/µL                  US pelvis complete w transvaginal   Final Result by Juany Little MD (01/12 6543)      1  Fibroid uterus  2   No evidence of ovarian torsion  3   Right adnexal fluid-filled tubular structure suspicious for hydrosalpinx  Workstation performed: [id number]         CT abdomen pelvis wo contrast   Final Result by Cira Larose MD (01/12 6553)      Moderate to markedly enlarged lobulated appearing uterus likely secondary to uterine fibroids  A nonemergent pelvic ultrasound is recommended for further evaluation  Although there is no evidence of hydronephrosis or genitourinary tract calculus, this enlarged uterus may be causing extrinsic compression on the distal ureter                 Workstation performed: [id number]                    Procedures  Procedures       Phone Contacts  ED Phone Contact    ED Course                               MDM  CritCare Time    Disposition  Final diagnoses:   Pelvic pain   Hydrosalpinx   Fibroids     Time reflects when diagnosis was documented in both MDM as applicable and the Disposition within this note     Time User Action Codes Description Comment    1/12/2019  6:38 PM Eleni Numbers Add [R10 2] Pelvic pain     1/12/2019  6:38 PM Eleni Numbers Add [N70 11] Hydrosalpinx     1/12/2019  6:39 PM Eleni Numbers Add [D21 9] Fibroids       ED Disposition     ED Disposition Condition Comment    Transfer to Another [facility] [address] should be transferred out to [facility] ST jovita ruggiero MD Documentation      Most Recent Value   Patient Condition  The patient has been stabilized such that within reasonable medical probability, no material deterioration of the patient condition or the condition of the unborn child(corinna) is likely to result from the transfer   Reason for Transfer  Level of Care needed not available at this facility   Benefits of Transfer  Continuity of care, Patient preference   Risks of Transfer  Potential for delay in receiving treatment, Potential deterioration of medical condition, Loss of IV, Increased discomfort during transfer, Possible worsening of condition or death during transfer   Accepting Physician  Dr Lisseth Reyes Name, Marta Ball MD  [facility]   Provider Certification  General risk, such as traffic hazards, adverse weather conditions, rough terrain or turbulence, possible failure of equipment (including vehicle or aircraft), or consequences of actions of persons outside the control of the transport personnel      RN Documentation      Most [facility] Name, Marta Shepard       Follow-up Information    None         Discharge Medication List as of 1/12/2019 10:42 PM      CONTINUE these medications which have NOT CHANGED    Details   albuterol (PROVENTIL HFA,VENTOLIN HFA) 90 mcg/act inhaler Inhale 2 puffs every 4 (four) hours as needed for wheezing, Starting Fri 11/23/2018, Print      benzonatate (TESSALON PERLES) 100 mg capsule Take 1 capsule (100 mg total) by mouth every 8 (eight) hours, Starting Fri 11/23/2018, Print      fluticasone (FLONASE) 50 mcg/act nasal spray 1 spray into each nostril daily, Starting Fri 11/23/2018, Print      loratadine (CLARITIN) 10 mg tablet Take 1 tablet (10 mg total) by mouth daily, Starting Fri 11/23/2018, Print           No discharge procedures on file      ED Provider  Electronically Signed by           Rere Duran PA-C  01/15/19 7295

## 2019-01-13 PROBLEM — R10.2 PELVIC PAIN: Status: ACTIVE | Noted: 2019-01-13

## 2019-01-13 LAB
BASOPHILS # BLD AUTO: 0.03 THOUSANDS/ΜL (ref 0–0.1)
BASOPHILS NFR BLD AUTO: 0 % (ref 0–1)
EOSINOPHIL # BLD AUTO: 0.21 THOUSAND/ΜL (ref 0–0.61)
EOSINOPHIL NFR BLD AUTO: 2 % (ref 0–6)
ERYTHROCYTE [DISTWIDTH] IN BLOOD BY AUTOMATED COUNT: 18.1 % (ref 11.6–15.1)
HCT VFR BLD AUTO: 33.2 % (ref 34.8–46.1)
HGB BLD-MCNC: 9.3 G/DL (ref 11.5–15.4)
IMM GRANULOCYTES # BLD AUTO: 0.01 THOUSAND/UL (ref 0–0.2)
IMM GRANULOCYTES NFR BLD AUTO: 0 % (ref 0–2)
LYMPHOCYTES # BLD AUTO: 2.52 THOUSANDS/ΜL (ref 0.6–4.47)
LYMPHOCYTES NFR BLD AUTO: 26 % (ref 14–44)
MCH RBC QN AUTO: 19.3 PG (ref 26.8–34.3)
MCHC RBC AUTO-ENTMCNC: 28 G/DL (ref 31.4–37.4)
MCV RBC AUTO: 69 FL (ref 82–98)
MONOCYTES # BLD AUTO: 0.81 THOUSAND/ΜL (ref 0.17–1.22)
MONOCYTES NFR BLD AUTO: 8 % (ref 4–12)
NEUTROPHILS # BLD AUTO: 6.26 THOUSANDS/ΜL (ref 1.85–7.62)
NEUTS SEG NFR BLD AUTO: 64 % (ref 43–75)
NRBC BLD AUTO-RTO: 0 /100 WBCS
PLATELET # BLD AUTO: 424 THOUSANDS/UL (ref 149–390)
PMV BLD AUTO: 8.5 FL (ref 8.9–12.7)
RBC # BLD AUTO: 4.82 MILLION/UL (ref 3.81–5.12)
WBC # BLD AUTO: 9.84 THOUSAND/UL (ref 4.31–10.16)

## 2019-01-13 PROCEDURE — 85025 COMPLETE CBC W/AUTO DIFF WBC: CPT | Performed by: STUDENT IN AN ORGANIZED HEALTH CARE EDUCATION/TRAINING PROGRAM

## 2019-01-13 PROCEDURE — 87491 CHLMYD TRACH DNA AMP PROBE: CPT | Performed by: OBSTETRICS & GYNECOLOGY

## 2019-01-13 PROCEDURE — 87591 N.GONORRHOEAE DNA AMP PROB: CPT | Performed by: OBSTETRICS & GYNECOLOGY

## 2019-01-13 RX ORDER — DOCUSATE SODIUM 100 MG/1
100 CAPSULE, LIQUID FILLED ORAL 2 TIMES DAILY
Status: DISCONTINUED | OUTPATIENT
Start: 2019-01-13 | End: 2019-01-14 | Stop reason: HOSPADM

## 2019-01-13 RX ORDER — OXYCODONE HYDROCHLORIDE AND ACETAMINOPHEN 5; 325 MG/1; MG/1
1 TABLET ORAL EVERY 4 HOURS PRN
Status: DISCONTINUED | OUTPATIENT
Start: 2019-01-13 | End: 2019-01-14 | Stop reason: HOSPADM

## 2019-01-13 RX ORDER — SENNOSIDES 8.6 MG
1 TABLET ORAL 2 TIMES DAILY
Status: DISCONTINUED | OUTPATIENT
Start: 2019-01-13 | End: 2019-01-14 | Stop reason: HOSPADM

## 2019-01-13 RX ORDER — OXYCODONE HYDROCHLORIDE AND ACETAMINOPHEN 5; 325 MG/1; MG/1
2 TABLET ORAL EVERY 4 HOURS PRN
Status: DISCONTINUED | OUTPATIENT
Start: 2019-01-13 | End: 2019-01-14 | Stop reason: HOSPADM

## 2019-01-13 RX ADMIN — INDOMETHACIN 50 MG: 50 CAPSULE ORAL at 07:29

## 2019-01-13 RX ADMIN — HYDROMORPHONE HYDROCHLORIDE 2 MG: 2 INJECTION, SOLUTION INTRAMUSCULAR; INTRAVENOUS; SUBCUTANEOUS at 10:56

## 2019-01-13 RX ADMIN — DOCUSATE SODIUM 100 MG: 100 CAPSULE, LIQUID FILLED ORAL at 17:45

## 2019-01-13 RX ADMIN — ONDANSETRON 4 MG: 2 INJECTION INTRAMUSCULAR; INTRAVENOUS at 14:49

## 2019-01-13 RX ADMIN — SENNOSIDES 8.6 MG: 8.6 TABLET, FILM COATED ORAL at 17:45

## 2019-01-13 RX ADMIN — OXYCODONE HYDROCHLORIDE AND ACETAMINOPHEN 2 TABLET: 5; 325 TABLET ORAL at 15:51

## 2019-01-13 RX ADMIN — SENNOSIDES 8.6 MG: 8.6 TABLET, FILM COATED ORAL at 07:29

## 2019-01-13 RX ADMIN — INDOMETHACIN 50 MG: 50 CAPSULE ORAL at 17:45

## 2019-01-13 RX ADMIN — HYDROMORPHONE HYDROCHLORIDE 2 MG: 2 INJECTION, SOLUTION INTRAMUSCULAR; INTRAVENOUS; SUBCUTANEOUS at 04:30

## 2019-01-13 RX ADMIN — OXYCODONE HYDROCHLORIDE AND ACETAMINOPHEN 2 TABLET: 5; 325 TABLET ORAL at 20:35

## 2019-01-13 RX ADMIN — DOCUSATE SODIUM 100 MG: 100 CAPSULE, LIQUID FILLED ORAL at 08:39

## 2019-01-13 RX ADMIN — INDOMETHACIN 50 MG: 50 CAPSULE ORAL at 13:17

## 2019-01-13 NOTE — PROGRESS NOTES
Update given to GYN on patient  Nausea and pain continue  Not tolerating foods/liquids by mouth  Pain 8/10 lower right abd continues  See MAR  Denies vomiting

## 2019-01-13 NOTE — PROGRESS NOTES
Pt reported vomitting x4, clear liquid, at home prior to going to [facility] ED  Pt denies N/V at this time  Pt also stated that her RLQ pain was so intense that it brought her to her knees and she fell  Family then brought pt to ED  Pt resting comfortably in bed with call bell in reach

## 2019-01-13 NOTE — ED NOTES
Plan for [facility] to pickup patient at 22:15       Miriam Marrero RN  01/12/19 9818

## 2019-01-13 NOTE — PROGRESS NOTES
Progress Note - OB/GYN   Selena Orozco 25 y o  female MRN: [id number]  Unit/Bed#: E5 -01 Encounter: [id number]    Assessment:  25 y o  P0 admitted for pelvic pain, hospital day #2    Plan:  Pelvic pain: continue indocin, dilaudid PRN  Constipation: colace  FEN: regular diet  PPX: SCDs    Disposition: anticipate discharge later today after tolerating breakfast and with improved pain control    Subjective/Objective   Chief Complaint:     25 y o  P0 admitted for pelvic pain, hospital day #2    Subjective:     Pain: rated 9/10 earlier this morning, now improved after dose of dilaudid  Tolerating PO: has yet to attempt but desires breakfast  Voiding: no  Flatus: yes  BM: no  Ambulating: no  Chest pain: no  Shortness of breath: no  Leg pain: no      Objective:     Vitals: Blood pressure 125/94, pulse 80, temperature 97 5 °F (36 4 °C), temperature source Temporal, resp  rate 18, last menstrual period 01/02/2019, SpO2 100 %, not currently breastfeeding  Physical Exam:     Physical Exam   Constitutional: She is oriented to person, place, and time  She appears well-developed and well-nourished  No distress  Cardiovascular: Normal rate, regular rhythm, normal heart sounds and intact distal pulses  Pulmonary/Chest: Effort normal and breath sounds normal  No respiratory distress  She has no wheezes  Abdominal: Soft  Bowel sounds are normal  She exhibits no distension  There is tenderness  + tenderness to right lower quadrant   Neurological: She is alert and oriented to person, place, and time  Skin: Skin is warm and dry  She is not diaphoretic  Psychiatric: She has a normal mood and affect  Her behavior is normal          Lab, Imaging and other studies: I have personally reviewed pertinent reports        Lab Results   Component Value Date    WBC 9 80 01/12/2019    HGB 10 0 (L) 01/12/2019    HCT 33 3 (L) 01/12/2019    MCV 64 (L) 01/12/2019     (H) 01/12/2019               Vidal Sow DO  01/13/19

## 2019-01-13 NOTE — PROGRESS NOTES
Patient seen and assessed at bedside with Dr Lesia Butler  Patient reports continued abdominal pain that is improved with Dilaudid  She also reports continued nausea and reports that she did take Zofran which has improved her nausea some  She has not been eating because of the nausea  She denies vomiting  Patient reports she has not had a bowel movement yet following Senokot  /74 (BP Location: Left arm)   Pulse 73   Temp 97 6 °F (36 4 °C) (Tympanic)   Resp 18   LMP 01/02/2019 (Approximate)   SpO2 98%   General:  Appears uncomfortable but in no acute distress  Abdomen:  Soft, palpable fibroid at umbilicus, tenderness elicited in left and right lower quadrants where fibroid is palpable  No rebound, no guarding  A/P:  49-year-old female with approximately 20 week fibroid uterus and abdominal pain most likely secondary to degenerating fibroid, clinically stable  1  Patient counseled on different surgical options available for fibroid uterus including hysterectomy, myomectomy and uterine artery embolization  She is to follow up outpatient for further counseling and setting up surgical intervention  In addition patient has not seen a gyn in several years and requires Pap smear in general care  2  CBC with diff  3  Pain management:  Transition to PO pain medications  Percocet 1-2 tabs ordered for moderate and severe pain, Indocin PO TID continued  4  Zofran for nausea  5  FEN:  Regular diet as tolerated  6   Continue to follow patient and serial abdominal examinations    Jordy Breaux MD  01/13/19

## 2019-01-13 NOTE — PROGRESS NOTES
Patient showed me her home med, which is SED-MAX, which is Acetaminophen 500 mg and Caffeine 65 mg po  She takes two tablets as needed for pain  However, she states that this was not working for pain control for the abdomen pain

## 2019-01-13 NOTE — PLAN OF CARE
GASTROINTESTINAL - ADULT     Minimal or absence of nausea and/or vomiting Progressing     Maintains or returns to baseline bowel function Progressing     Maintains adequate nutritional intake Progressing     Establish and maintain optimal ostomy function Progressing        GENITOURINARY - ADULT     Maintains or returns to baseline urinary function Progressing     Absence of urinary retention Progressing     Urinary catheter remains patent Progressing        PAIN - ADULT     Verbalizes/displays adequate comfort level or baseline comfort level Progressing        SAFETY ADULT     Patient will remain free of falls Progressing     Maintain or return to baseline ADL function Progressing     Maintain or return mobility status to optimal level Progressing

## 2019-01-13 NOTE — ED NOTES
Plan for transfer pickup time to [address] at 22:00  Dr Maycol Carballo accepting physician  Number to call for report [phone]       Shant Alaniz RN  01/12/19 Galilea Valles4, TU  01/12/19 1877

## 2019-01-13 NOTE — UTILIZATION REVIEW
Initial Clinical Review    Admission: Date/Time/Statement: 1/12/19 @ 2243   Orders Placed This Encounter   Procedures    Place in Observation     Standing Status:   Standing     Number of Occurrences:   1     Order Specific Question:   Admitting Physician     Answer:   Abundio Brooks [[id number]]     Order Specific Question:   Level of Care     Answer:   Med Surg [16]     ED: Date/Time/Mode of Arrival:   ED Arrival Information     Patient in [facility] Emergency Department                     Chief Complaint: [address]   History of Illness: 47-year-old female presents emergency department signed out to myself by previous provider  Diffuse abdominal did pain and discomfort in the pelvic area  CT scan and ultrasound performed shows no evidence of torsion but noted large fibroids large uterus as well as a hydrosalpinx  Could be the cause the patient's symptoms patient needed further evaluation over at [facility] consult and admission over to [facility] under the gyn service for noted worsening pain  ED Vital Signs:   ED Triage Vitals   Temperature Pulse Respirations Blood Pressure SpO2   01/12/19 2300 01/12/19 2300 01/12/19 2300 01/12/19 2300 01/12/19 2300   97 5 °F (36 4 °C) 80 18 125/94 100 %      Temp Source Heart Rate Source Patient Position - Orthostatic VS BP Location FiO2 (%)   01/12/19 2300 01/13/19 0720 01/12/19 2300 01/12/19 2300 --   Temporal Monitor Lying Left arm       Pain Score       01/12/19 2248       8        Wt Readings from Last 1 Encounters:   01/12/19 107 kg (235 lb)     CT A/P  (+) FIBROID AND HYDOSALPINX   ED Treatment:   Medication Administration - No Administrations Displayed (No Start Event Found)     None        Past Medical/Surgical History:    Active Ambulatory Problems     Diagnosis Date Noted    No Active Ambulatory Problems     Resolved Ambulatory Problems     Diagnosis Date Noted    No Resolved Ambulatory Problems     No Additional Past Medical History Admitting Diagnosis: Abdominal pain [R10 9]  Age/Sex: 25 y o  female  Assessment/Plan: PRESENTED WITH PELVIC PAIN THAT AWOKE HER AND RADIATES FROM LOWER RIGHT QUAD INTO HER THIGHS  (+) BACK PAIN  Admission Orders:  Scheduled Meds:   Current Facility-Administered Medications:  docusate sodium 100 mg Oral BID   HYDROmorphone 2 mg Intravenous Q6H PRN   indomethacin 50 mg Oral TID With Meals   ondansetron 4 mg Intravenous Q6H PRN   senna 1 tablet Oral BID     SALINE LOCK  DILAUDID PRN  X 1  COMPRESSION DEVICE

## 2019-01-13 NOTE — H&P
H&P Exam - Gynecology   Selena Orozco 25 y o  female MRN: [id number]  Unit/Bed#: E5 -01 Encounter: [id number]    Chief Complaint: Abdominal and Pelvic Pain    History of Present Illness     HPI:  Selena Orozco is a 25 y o  G0, LMP 12/29/2018 who presents for evaluation of abdominal and pelvic pain  Patient reports onset of right lower quadrant discomfort earlier this morning around midnight, stating that she was able to sleep despite the pain, but awoke around 0630 with increasing discomfort  States that pain radiated from her right lower quadrant into her lower back and into her right things  Minimal relief with over the counter pain medication  Reports last bowel movement Friday evening, though patient endorses regular bowel movements and also tried miralax thinking she was in pain as a result of constipation, but did not achieve any relief  Subsequently presented to [facility] for further evaluation, at which time she reports improvement of pain with morphine given to her at the [facility] ED  Denies previous episodes of current abdominal/pelvic pain  Reports regular monthly menses, noting minimal cramping on the first day with minimal bleeding and cramping throughout remainder of period  Not using contraception as in same sex relationship  Denies vaginal discharge  Denies history of STDs, denies current concerns for STDs  Does not currently follow with gynecologic provider  Denies associated fevers, chills, chest pain, shortness of breath, dysuria, hematuria, nausea, vomiting, diarrhea  Review of Systems   Constitutional: Negative for appetite change, chills, diaphoresis and fatigue  Respiratory: Negative for cough, chest tightness and shortness of breath  Cardiovascular: Negative for chest pain, palpitations and leg swelling  Gastrointestinal: Positive for abdominal pain and constipation  Negative for diarrhea, nausea and vomiting  Genitourinary: Positive for pelvic pain   Negative for dysuria, hematuria, menstrual problem, vaginal bleeding, vaginal discharge and vaginal pain  Musculoskeletal: Positive for back pain  Negative for arthralgias, gait problem and myalgias  Neurological: Negative for dizziness, weakness, light-headedness and headaches  Psychiatric/Behavioral: Negative for agitation, behavioral problems and sleep disturbance  The patient is not nervous/anxious  Historical Information   No past medical history on file  No past surgical history on file  No family history on file  Social History   History   Alcohol Use    Yes     History   Drug Use    Types: Marijuana     History   Smoking Status    Never Smoker   Smokeless Tobacco    Never Used       Meds/Allergies   Prescriptions Prior to Admission   Medication    albuterol (PROVENTIL HFA,VENTOLIN HFA) 90 mcg/act inhaler    benzonatate (TESSALON PERLES) 100 mg capsule    fluticasone (FLONASE) 50 mcg/act nasal spray    loratadine (CLARITIN) 10 mg tablet     Allergies   Allergen Reactions    Amoxicillin Facial Swelling    Penicillins        Objective   Vitals: Last menstrual period 01/02/2019, not currently breastfeeding  No intake or output data in the 24 hours ending 01/12/19 4223    Invasive Devices: Invasive Devices     Peripheral Intravenous Line            Peripheral IV 01/12/19 Right Antecubital less than 1 day                Physical Exam   Constitutional: She appears well-developed and well-nourished  No distress  Cardiovascular: Normal rate, regular rhythm, normal heart sounds and intact distal pulses  Pulmonary/Chest: Effort normal and breath sounds normal  No respiratory distress  She has no wheezes  Abdominal: Soft  Bowel sounds are normal  She exhibits no distension and no mass  There is tenderness  There is no rebound and no guarding  Tenderness to right lower quadrant on deep palpation, Non-palpable uterus   Genitourinary: There is no rash, tenderness or lesion on the right labia  There is no rash, tenderness or lesion on the left labia  Cervix exhibits no motion tenderness, no discharge and no friability  Right adnexum displays tenderness  Right adnexum displays no mass and no fullness  Left adnexum displays tenderness  Left adnexum displays no mass and no fullness  There is tenderness in the vagina  No erythema or bleeding in the vagina  No signs of injury around the vagina  No vaginal discharge found  Genitourinary Comments: Tolerated pelvic exam with difficulty noting general discomfort, without cervical motion tenderness, adnexal discomfort R greater than L, no adnexal masses   Skin: Skin is warm  She is not diaphoretic         Lab Results:   Admission on 01/12/2019, Discharged on 01/12/2019   Component Date Value    Color, UA 01/12/2019 Straw     Clarity, UA 01/12/2019 Clear     Specific Gravity, UA 01/12/2019 1 015     pH, UA 01/12/2019 8 0     Leukocytes, UA 01/12/2019 Negative     Nitrite, UA 01/12/2019 Negative     Protein, UA 01/12/2019 Negative     Glucose, UA 01/12/2019 Negative     Ketones, UA 01/12/2019 Negative     Bilirubin, UA 01/12/2019 Negative     Blood, UA 01/12/2019 Negative     UROBILINOGEN UA 01/12/2019 Negative     WBC 01/12/2019 9 80     RBC 01/12/2019 5 19     Hemoglobin 01/12/2019 10 0*    Hematocrit 01/12/2019 33 3*    MCV 01/12/2019 64*    MCH 01/12/2019 19 2*    MCHC 01/12/2019 29 9*    RDW 01/12/2019 18 5*    MPV 01/12/2019 7 0*    Platelets 07/61/9149 483*    Neutrophils Relative 01/12/2019 67*    Lymphocytes Relative 01/12/2019 25     Monocytes Relative 01/12/2019 6     Eosinophils Relative 01/12/2019 2     Basophils Relative 01/12/2019 1     Neutrophils Absolute 01/12/2019 6 50     Lymphocytes Absolute 01/12/2019 2 40     Monocytes Absolute 01/12/2019 0 60     Eosinophils Absolute 01/12/2019 0 20     Basophils Absolute 01/12/2019 0 10     Sodium 01/12/2019 139     Potassium 01/12/2019 4 0     Chloride 01/12/2019 104     CO2 01/12/2019 24     ANION GAP 01/12/2019 11     BUN 01/12/2019 10     Creatinine 01/12/2019 0 53*    Glucose 01/12/2019 105*    Calcium 01/12/2019 8 9     AST 01/12/2019 30     ALT 01/12/2019 21     Alkaline Phosphatase 01/12/2019 70     Total Protein 01/12/2019 8 4     Albumin 01/12/2019 4 4     Total Bilirubin 01/12/2019 0 50     eGFR 01/12/2019 147     Lipase 01/12/2019 45     HCG, Quant 01/12/2019 <3     RBC Morphology 01/12/2019 abnormal     Hypochromia 01/12/2019 Present     Platelet Estimate 23/38/6394 Increased*      Vaginosis DNA Probe, GC/CT pending     Imaging: I have personally reviewed pertinent reports  CT ABDOMEN AND PELVIS WITHOUT IV CONTRAST     INDICATION:   right flank pain r/o renal stone      COMPARISON:  CT abdomen/pelvis dated January 2, 2014      TECHNIQUE:  CT examination of the abdomen and pelvis was performed without intravenous contrast   Axial, sagittal, and coronal 2D reformatted images were created from the source data and submitted for interpretation       Radiation dose length product (DLP) for this visit:  336 mGy-cm   This examination, like all CT scans performed in the [facility], was performed utilizing techniques to minimize radiation dose exposure, including the use of iterative   reconstruction and automated exposure control       Enteric contrast was not administered       FINDINGS:     ABDOMEN     LOWER CHEST:  No clinically significant abnormality identified in the visualized lower chest      LIVER/BILIARY TREE:  Unremarkable      GALLBLADDER:  No calcified gallstones  No pericholecystic inflammatory change      SPLEEN:  Unremarkable      PANCREAS:  Unremarkable      ADRENAL GLANDS:  Unremarkable      KIDNEYS/URETERS:  Unremarkable  No hydronephrosis      STOMACH AND BOWEL:  Unremarkable      APPENDIX:  A normal appendix was visualized      ABDOMINOPELVIC CAVITY:  No ascites or free intraperitoneal air   No lymphadenopathy      VESSELS:  Unremarkable for patient's age      PELVIS     REPRODUCTIVE ORGANS:  The uterus is moderate to markedly enlarged measuring 19 5 x 11 6 x 17 2 cm or (CC, transverse, AP) the size of the uterus is significantly increased when compared to the prior exam      URINARY BLADDER:  Unremarkable      ABDOMINAL WALL/INGUINAL REGIONS:  Unremarkable      OSSEOUS STRUCTURES:  No acute fracture or destructive osseous lesion      IMPRESSION:     Moderate to markedly enlarged lobulated appearing uterus likely secondary to uterine fibroids  A nonemergent pelvic ultrasound is recommended for further evaluation      Although there is no evidence of hydronephrosis or genitourinary tract calculus, this enlarged uterus may be causing extrinsic compression on the distal ureter  PELVIC ULTRASOUND, COMPLETE     INDICATION:  25years old  pain r/o ovarian torsion  pain  r/o ovarian torsion      COMPARISON: None     TECHNIQUE:   Transabdominal pelvic ultrasound was performed in sagittal and transverse planes with a curvilinear transducer  Additional transvaginal imaging was performed to better evaluate the endometrium and ovaries  Imaging included volumetric   sweeps as well as traditional still imaging technique      FINDINGS:     UTERUS:  The uterus is anteverted in position, measuring 12 4 x 4 7 x 5 7 cm  Heterogeneous with multiple fibroids noted, largest measuring 15 0 x 11 5 cm at the fundus  The cervix shows no suspicious abnormality      ENDOMETRIUM:    Normal caliber of 9 mm  Homogenous and normal in appearance      OVARIES/ADNEXA:  Right ovary:  2 8 x 3 7 x 2 6 cm  No suspicious right ovarian abnormality  Right adnexal tubular structure suspicious for hydrosalpinx  Doppler flow within normal limits      Left ovary:  3 7 x 3 4 x 2 8 cm  No suspicious left ovarian abnormality  Doppler flow within normal limits      No suspicious adnexal mass or loculated collections    Physiologic free fluid within the cul-de-sac      IMPRESSION:     1  Fibroid uterus  2   No evidence of ovarian torsion  3   Right adnexal fluid-filled tubular structure suspicious for hydrosalpinx  EKG, Pathology, and Other Studies: I have personally reviewed pertinent reports  Assessment/Plan     Assessment:  28 G0 with pelvic pain secondary to fibroid uterus  Plan:  Pain: Indocin 100 mg now, 50 mg TID x 72 hours, Dilaudid 2 mg PRN  FEN: regular diet  PPX: SCDs    Code Status: No Order    Counseling / Coordination of Care  Total floor / unit time spent today15 minutes  Minutes      Rose Puckett DO

## 2019-01-14 VITALS
RESPIRATION RATE: 18 BRPM | TEMPERATURE: 97 F | DIASTOLIC BLOOD PRESSURE: 70 MMHG | OXYGEN SATURATION: 95 % | SYSTOLIC BLOOD PRESSURE: 127 MMHG | HEART RATE: 84 BPM

## 2019-01-14 PROBLEM — D25.9 FIBROID UTERUS: Status: ACTIVE | Noted: 2019-01-14

## 2019-01-14 LAB
C TRACH DNA SPEC QL NAA+PROBE: NEGATIVE
N GONORRHOEA DNA SPEC QL NAA+PROBE: NEGATIVE

## 2019-01-14 PROCEDURE — 99224 PR SBSQ OBSERVATION CARE/DAY 15 MINUTES: CPT | Performed by: OBSTETRICS & GYNECOLOGY

## 2019-01-14 RX ORDER — INDOMETHACIN 50 MG/1
50 CAPSULE ORAL
Qty: 21 CAPSULE | Refills: 0 | Status: SHIPPED | OUTPATIENT
Start: 2019-01-14 | End: 2019-01-30

## 2019-01-14 RX ORDER — OXYCODONE HYDROCHLORIDE AND ACETAMINOPHEN 5; 325 MG/1; MG/1
1 TABLET ORAL EVERY 4 HOURS PRN
Qty: 12 TABLET | Refills: 0 | Status: SHIPPED | OUTPATIENT
Start: 2019-01-14 | End: 2019-01-15 | Stop reason: SDUPTHER

## 2019-01-14 RX ADMIN — INDOMETHACIN 50 MG: 50 CAPSULE ORAL at 07:53

## 2019-01-14 RX ADMIN — SENNOSIDES 8.6 MG: 8.6 TABLET, FILM COATED ORAL at 08:01

## 2019-01-14 RX ADMIN — DOCUSATE SODIUM 100 MG: 100 CAPSULE, LIQUID FILLED ORAL at 08:02

## 2019-01-14 RX ADMIN — ONDANSETRON 4 MG: 2 INJECTION INTRAMUSCULAR; INTRAVENOUS at 07:52

## 2019-01-14 RX ADMIN — OXYCODONE HYDROCHLORIDE AND ACETAMINOPHEN 2 TABLET: 5; 325 TABLET ORAL at 05:48

## 2019-01-14 RX ADMIN — OXYCODONE HYDROCHLORIDE AND ACETAMINOPHEN 1 TABLET: 5; 325 TABLET ORAL at 01:37

## 2019-01-14 NOTE — NURSING NOTE
Patient given discharge instructions  Patient picking up prescription at [address] [facility] [address]  Patient left with all belongings

## 2019-01-14 NOTE — DISCHARGE INSTRUCTIONS
Uterine Fibroids   WHAT YOU NEED TO KNOW:   Uterine fibroids are growths found inside your uterus (womb)  Uterine fibroids also may be called tumors (lumps) or leiomyomas  Uterine fibroids often appear in groups, or you may have only one  They can be small or large, and they can grow in size  They are almost always benign (not cancer) and likely will not spread to other parts of your body  Constipation   WHAT YOU NEED TO KNOW:   What is constipation? Constipation is when you have hard, dry bowel movements, or you go longer than usual between bowel movements  What causes constipation? · Not enough water or high-fiber foods    · Lack of physical activity    · Medicine used to treat pain, depression, or high blood pressure    · Medical conditions, such as hemorrhoids, diabetes, or a stroke  What are the signs and symptoms of constipation? · Difficulty pushing out your bowel movement    · Pain or bleeding during your bowel movement    · A feeling that you did not finish having your bowel movement    · Nausea    · Bloating    · Headache  How is constipation treated? Medicine or a fiber supplement may help make your bowel movement softer  A laxative may help relax and loosen your intestines to help you have a bowel movement  You may also be given medicine to increase fluid in your intestines  The fluid may help move bowel movements through your intestines  How can I manage my symptoms? · Drink liquids as directed  You may need to drink extra liquids to help soften and move your bowels  Ask how much liquid to drink each day and which liquids are best for you  · Eat high-fiber foods  This may help decrease constipation by adding bulk to your bowel movements  High-fiber foods include fruit, vegetables, whole-grain breads and cereals, and beans  Your healthcare provider or dietitian can help you create a high-fiber meal plan  · Exercise regularly    Regular physical activity can help stimulate your intestines  Ask which exercises are best for you  · Schedule a time each day to have a bowel movement  This may help train your body to have regular bowel movements  Bend forward while you are on the toilet to help move the bowel movement out  Sit on the toilet for at least 10 minutes, even if you do not have a bowel movement  When should I seek immediate care? · You have blood in your bowel movements  · You have a fever and abdominal pain with constipation  When should I contact my healthcare provider? · Your constipation gets worse  · You start to vomit  · You have questions or concerns about your condition or care  CARE AGREEMENT:   You have the right to help plan your care  Learn about your health condition and how it may be treated  Discuss treatment options with your caregivers to decide what care you want to receive  You always have the right to refuse treatment  The above information is an  only  It is not intended as medical advice for individual conditions or treatments  Talk to your doctor, nurse or pharmacist before following any medical regimen to see if it is safe and effective for you  © 2017 [address] [facility] Information is for End User's use only and may not be sold, redistributed or otherwise used for commercial purposes  All illustrations and images included in CareNotes® are the copyrighted property of A D A M , Inc  or [facility] INSTRUCTIONS:   Medicines:   · Pain medicine: You may be given medicine to take away or decrease pain  Do not wait until the pain is severe before you take your medicine  · Hormone medicine: This medicine changes the level of certain hormones and may then help shrink your fibroids  · Contraceptives: These medicines help prevent pregnancy  They also may help shrink your fibroids       · Take your medicine as directed:  Call your healthcare provider if you think your medicine is not helping or if you have side effects  Tell him if you are taking any vitamins, herbs, or other medicines  Keep a list of the medicines you take  Include the amounts, and when and why you take them  Bring the list or the pill bottles to follow-up visits  Follow up with your healthcare provider as directed:  Write down your questions so you remember to ask them during your visits  Avoid heavy lifting: You will need to avoid lifting heavy objects for a period of time after surgery  This helps prevent injury to your surgery wound  Ask your healthcare provider when you may return to your normal daily activities  Avoid pregnancy:  You will need to avoid pregnancy until you have healed after treatment for your fibroids  Ask your healthcare provider when it is safe to become pregnant  Care for your wound:  Ask your healthcare provider how to care for your surgery wound  Contact your healthcare provider if:   · You feel weak and are more tired than usual      · You do not feel like your bladder is empty after you urinate  You also may urinate small amounts more often  · You have new or worse hot flashes  · You have any questions about your condition or care  Seek immediate care or call 911 if:   · Your heart begins to race, and you feel faint  · You have increased vaginal bleeding, pelvic pain, or pelvic pressure  · You have a fever  · Your leg feels warm, tender, and painful  It may look swollen and red  · You cough up blood  · You feel lightheaded, short of breath, and have chest pain  You cough up blood  · You feel lightheaded, short of breath, and have chest pain  © 2017 [address] [employer] Information is for End User's use only and may not be sold, redistributed or otherwise used for commercial purposes  All illustrations and images included in CareNotes® are the copyrighted property of [employer]  or Ramses Beckham    The above information is an  only  It is not intended as medical advice for individual conditions or treatments  Talk to your doctor, nurse or pharmacist before following any medical regimen to see if it is safe and effective for you  Pelvic Pain in Women   WHAT YOU NEED TO KNOW:   You may have pain on one or both sides of your pelvis  Pelvic pain may occur with certain body positions or activities, such as when you have sex or a bowel movement  It may worsen during your monthly period or after you sit or stand for a long time  Chronic pelvic pain is pain that continues for longer than 6 months  DISCHARGE INSTRUCTIONS:   Call 911 for any of the following:   · You have severe chest pain and sudden trouble breathing  Seek care immediately if:   · You have heavy or unusual vaginal bleeding, and you feel lightheaded or faint  Contact your healthcare provider if:   · You have pelvic pain that does not go away after you take pain medicine  · You develop new symptoms or your symptoms are worse than before  · You have questions or concerns about your condition or care  Medicines: You may need any of the following:  · Pain medicine  may be given in pills or creams to relieve your pain  · Hormones  may be given if your pain gets worse with your menstrual cycle  · Antibiotics  may be given if your pain is caused by infection  · Take your medicine as directed  Contact your healthcare provider if you think your medicine is not helping or if you have side effects  Tell him or her if you are allergic to any medicine  Keep a list of the medicines, vitamins, and herbs you take  Include the amounts, and when and why you take them  Bring the list or the pill bottles to follow-up visits  Carry your medicine list with you in case of an emergency  Self-care:   · Keep a pain diary  Write down when your pain happens, how severe it is, and any other symptoms you have with your pain  A diary will help you keep track of pain cycles   It may also help your healthcare provider find out what is causing your pain  · Learn ways to relax  Deep breathing, meditation, and relaxation techniques can help decrease your pain  When you are tense, your pain may increase  · Change the foods you eat if you have irritable bowel syndrome  Ask your healthcare provider about the best foods for you  Follow up with your healthcare provider as directed:  Write down your questions so you remember to ask them during your visits  © 2017 [address] [facility] Information is for End User's use only and may not be sold, redistributed or otherwise used for commercial purposes  All illustrations and images included in CareNotes® are the copyrighted property of A D A M , Inc  or Ramses Beckham  The above information is an  only  It is not intended as medical advice for individual conditions or treatments  Talk to your doctor, nurse or pharmacist before following any medical regimen to see if it is safe and effective for you

## 2019-01-14 NOTE — DISCHARGE INSTR - AVS FIRST PAGE
You can call and choose between Dr Lazarus Jansen office and the [facility] office  For addresses and contact info, see below  Thank you!

## 2019-01-14 NOTE — DISCHARGE SUMMARY
Discharge Summary - Oscar Carrero 25 y o  female MRN: [id number]    Unit/Bed#: E5 -01 Encounter: [id number]    Admission Date: 2019     Discharge Date: 2019    Admitting Diagnosis:   1  Fibroid uterus, degenerating    Discharge Diagnosis:   Same, pain controlled with percocet and indocin    Procedures:   None    Admitting Attending: Maribell Mcdaniel MD  Discharge Attending: Dr Cas Jasso Course:     Oscar Carrero is a 25 y o   who was admitted on 19 for pelvic pain secondary to a massively fibroid uterus (15 x 11 5cm) located at the fundus  In the ED acute abdominal pathology was ruled out and the patient was seen by GYN  Her pain was initially controlled with IV morphine and this was converted to PO indocin and percocet  Her CBC was WNL and there was no evidence of an acute infection process  On day of discharge, she was ambulating and able to reasonably perform all ADLs  She was voiding and had passed flatus  Pain was well controlled  She was discharged home on hospital day #3 without complications  Patient was instructed to follow up with her OB/GN as an outpatient and was given appropriate warnings to call provider if she develops signs of infection or uncontrolled pain  She was given 1 week worth of TID Indocin 50mg and 12 tablets of 5mg Percocet for moderate to severe pain PRN q4-6h  She will follow up outpatient either with [facility] or Dr Nick Higuera for her GYN care  Condition at discharge:   good     Disposition:   Home    Planned Readmission:   No    Discharge Medications:    Indocin 50mg TID for 7d (21 tablets)  Percocet 5mg q4-6h PRN (12 tablets)    Flora Barragan DO  PGY-2 OB/GYN   2019 8:44 AM

## 2019-01-14 NOTE — PROGRESS NOTES
Progress Note - OB/GYN   Fuentes Snider 25 y o  female MRN: [id number]  Unit/Bed#: E5 -01 Encounter: [id number]    Fuentes Snider is a patient of [facility]    Assessment:  Hospital Day: 3 Pt is a 26 yo  with 20w fibroid uterus (15 0 x 11 5 cm), stable    Plan:  1  20w fibroid uterus   - On TVUS, fibroid is fundal and 15 0 x 11 5 cm   - F/u outpatient regarding treatment options including possible surgical procedures; myomectomy, hysterectomy, [facility]   - Pt is in a homosexual relationship and states she "just wants it out" and is "never having children"  2  Pelvic pain   - See #1   - F/u Affirm culture and gonorrhea and chlamydia cultures (in process at time of this note)  3  Continue current meds    - See list below   - Pain relatively well controlled with TID indocin and 1-2 tabs 5/10mg percocet PRN for mod-severe pain, pt to continue these medications outpatient  4   Disposition   - VSS   - Anticipate discharge home today with f/u outpatient (pt also requires PAP, limited OB/GYN care)    Subjective/Objective     Chief Complaint:     Hospital Day: 3 with complaint of difficulty sleeping due to the pain, nausea has improved with zofran, no bowel movement despite Senokot, pt states she is passing flatus    Subjective:   Pain: yes  Tolerating Oral Intake: yes  Voiding: yes  Flatus: yes  Bowel Movement: no  Ambulating: yes  Chest Pain: no  Shortness of Breath: no  Leg Pain/Discomfort: no    Objective:   Vitals: /89 (BP Location: Right arm)   Pulse 81   Temp 98 1 °F (36 7 °C) (Temporal)   Resp 18   LMP 2019 (Approximate)   SpO2 97%     No intake or output data in the 24 hours ending 19 0639    Lab Results   Component Value Date    WBC 9 84 2019    HGB 9 3 (L) 2019    HCT 33 2 (L) 2019    MCV 69 (L) 2019     (H) 2019       Meds/Allergies   Current Facility-Administered Medications   Medication Dose Route Frequency    docusate sodium (COLACE) capsule 100 mg 100 mg Oral BID    indomethacin (INDOCIN) capsule 50 mg  50 mg Oral TID With Meals    ondansetron (ZOFRAN) injection 4 mg  4 mg Intravenous Q6H PRN    oxyCODONE-acetaminophen (PERCOCET) 5-325 mg per tablet 1 tablet  1 tablet Oral Q4H PRN    oxyCODONE-acetaminophen (PERCOCET) 5-325 mg per tablet 2 tablet  2 tablet Oral Q4H PRN    senna (SENOKOT) tablet 8 6 mg  1 tablet Oral BID     Physical Exam:  General: in no apparent distress, well developed and well nourished and non-toxic  Cardiovascular: Cor RRR  Lungs: clear to auscultation bilaterally  Abdomen: abdomen is soft, no guarding or rebound tenderness, uterus is approximately 20w, R > L sided pain on palpation, pt grimacing during R sided palpation  Lower extremeties: nontender    Padmini Brussels, DO  PGY-2 OB/GYN   1/14/2019 6:39 AM

## 2019-01-15 ENCOUNTER — OFFICE VISIT (OUTPATIENT)
Dept: OBGYN CLINIC | Facility: CLINIC | Age: 29
End: 2019-01-15
Payer: COMMERCIAL

## 2019-01-15 VITALS
BODY MASS INDEX: 36.57 KG/M2 | HEIGHT: 67 IN | OXYGEN SATURATION: 98 % | WEIGHT: 233 LBS | DIASTOLIC BLOOD PRESSURE: 80 MMHG | HEART RATE: 78 BPM | SYSTOLIC BLOOD PRESSURE: 122 MMHG

## 2019-01-15 DIAGNOSIS — R10.2 PELVIC PAIN: ICD-10-CM

## 2019-01-15 DIAGNOSIS — Z12.4 PAP SMEAR FOR CERVICAL CANCER SCREENING: ICD-10-CM

## 2019-01-15 DIAGNOSIS — D25.1 INTRAMURAL LEIOMYOMA OF UTERUS: ICD-10-CM

## 2019-01-15 DIAGNOSIS — Z72.3 INADEQUATE EXERCISE: ICD-10-CM

## 2019-01-15 DIAGNOSIS — E58 DIETARY CALCIUM DEFICIENCY: ICD-10-CM

## 2019-01-15 DIAGNOSIS — Z01.419 ENCOUNTER FOR ANNUAL ROUTINE GYNECOLOGICAL EXAMINATION: Primary | ICD-10-CM

## 2019-01-15 DIAGNOSIS — Z20.2 POSSIBLE EXPOSURE TO STD: ICD-10-CM

## 2019-01-15 PROBLEM — J30.2 SEASONAL ALLERGIES: Status: ACTIVE | Noted: 2019-01-15

## 2019-01-15 PROBLEM — J45.20 MILD INTERMITTENT ASTHMA: Status: ACTIVE | Noted: 2019-01-15

## 2019-01-15 LAB
CANDIDA RRNA VAG QL PROBE: NEGATIVE
G VAGINALIS RRNA GENITAL QL PROBE: POSITIVE
T VAGINALIS RRNA GENITAL QL PROBE: NEGATIVE

## 2019-01-15 PROCEDURE — G0145 SCR C/V CYTO,THINLAYER,RESCR: HCPCS | Performed by: OBSTETRICS & GYNECOLOGY

## 2019-01-15 PROCEDURE — 99385 PREV VISIT NEW AGE 18-39: CPT | Performed by: OBSTETRICS & GYNECOLOGY

## 2019-01-15 PROCEDURE — 87491 CHLMYD TRACH DNA AMP PROBE: CPT | Performed by: OBSTETRICS & GYNECOLOGY

## 2019-01-15 PROCEDURE — 87591 N.GONORRHOEAE DNA AMP PROB: CPT | Performed by: OBSTETRICS & GYNECOLOGY

## 2019-01-15 PROCEDURE — 87624 HPV HI-RISK TYP POOLED RSLT: CPT | Performed by: OBSTETRICS & GYNECOLOGY

## 2019-01-15 RX ORDER — OXYCODONE HYDROCHLORIDE AND ACETAMINOPHEN 5; 325 MG/1; MG/1
1-2 TABLET ORAL EVERY 6 HOURS PRN
Qty: 12 TABLET | Refills: 0 | Status: SHIPPED | OUTPATIENT
Start: 2019-01-15 | End: 2019-01-25

## 2019-01-15 NOTE — PROGRESS NOTES
Pt is a 25 y o  No obstetric history on file  with Patient's last menstrual period was 2018  using same sex relationship for [facility] presents for preventive care  She notes the same partner since her last STI evaluation  In her lifetime she has been involved with >50   Safe sexual practices (monogomy, condoms) are not followed consistently  Pt open to chlamydia/gonorrhea screening    Pt recently hospitalized secondary to pelvic pain, noted to have a 15 cm fibroid, likely degenerating  Pt is interested hysterectomy  She reports she is in a same sex relationship and she is helping to raise 4 children and she does not desire pregnancy  Pt reports she is continuing to have pain despite use of percocet and indomethacin  She reports she would like to have the surgery sooner than later  She reports her pain is greater on he right than the left  · She does  feel safe in the relationship  She does feel safe in her home  · Her calcium intake encompasses cheese for a total of <1 servings daily on average  She does take additional Vitamin D (MVI or supplement)  · She exercises 0 times per week  · Her menses occur every 30-60 Days, last 3-4 days and require super plus tampons  without pad backup every 2 hours on day one then every 4 hours hours  Menstrual History:  OB History      Para Term  AB Living    0 0 0 0 0 0    SAB TAB Ectopic Multiple Live Births    0 0 0 0 0        Obstetric Comments    Menarche: 8    Menses: 30-60/3-4/super plus tampon every 2 hours on day 1 and then every 4 hours         Menarche age: 6  Patient's last menstrual period was 2018  ·      · She has never recieved an HPV vaccine    · tobacco use : does not use tobacco              · Colonoscopy: not indicated  · Last pap  per patient, repeat collected today    Past Medical History:   Diagnosis Date    Asthma     Chickenpox     Seasonal allergies     Sleep apnea        Past Surgical History: Procedure Laterality Date    NO PAST SURGERIES         OB History    Para Term  AB Living   0 0 0 0 0 0   SAB TAB Ectopic Multiple Live Births   0 0 0 0 0         Obstetric Comments   Menarche: 8      Menses: 30-60/3-4/super plus tampon every 2 hours on day 1 and then every 4 hours       Gyn HX:  irregular menses       Current Outpatient Prescriptions:     albuterol (PROVENTIL HFA,VENTOLIN HFA) 90 mcg/act inhaler, Inhale 2 puffs every 4 (four) hours as needed for wheezing, Disp: 1 Inhaler, Rfl: 0    benzonatate (TESSALON PERLES) 100 mg capsule, Take 1 capsule (100 mg total) by mouth every 8 (eight) hours, Disp: 21 capsule, Rfl: 0    fluticasone (FLONASE) 50 mcg/act nasal spray, 1 spray into each nostril daily, Disp: 16 g, Rfl: 0    indomethacin (INDOCIN) 50 mg capsule, Take 1 capsule (50 mg total) by mouth 3 (three) times a day with meals for 7 days, Disp: 21 capsule, Rfl: 0    loratadine (CLARITIN) 10 mg tablet, Take 1 tablet (10 mg total) by mouth daily, Disp: 20 tablet, Rfl: 0    oxyCODONE-acetaminophen (PERCOCET) 5-325 mg per tablet, Take 1-2 tablets by mouth every 6 (six) hours as needed for moderate pain or severe pain for up to 10 days Max Daily Amount: 8 tablets, Disp: 12 tablet, Rfl: 0    Allergies   Allergen Reactions    Amoxicillin Tongue Swelling and Facial Swelling    Penicillins        Social History     Social History    Marital status: Significant Other     Spouse name: Gregory Carrasquillo Number of children: 0    Years of education: HS     Occupational History    unemployed      Social History Main Topics    Smoking status: Never Smoker    Smokeless tobacco: Never Used    Alcohol use Yes      Comment: h o alcohol abuse, very rare drinking since     Drug use: Yes     Frequency: 3 0 times per week     Types: Marijuana      Comment: 6 blunts/week; h o trying cocaine    Sexual activity: Yes     Partners: Female     Birth control/ protection: None      Comment: lifetime sexual partners: >50; current partner: 4 years; Pt reports she has had sex with men, but has been only have sex with women since age 10     Other Topics Concern    None     Social History Narrative    Episcopalian: no preference    Accepts blood products        Exercise: none    Calcium: occ cheese       Family History   Problem Relation Age of Onset    Colon cancer Father          age 40    Prostate cancer Father     Cirrhosis Father     Alcohol abuse Father     Sleep apnea Father     Cancer Maternal Grandmother     Other Mother         hysterectomy    No Known Problems Sister     No Known Problems Brother     Other Paternal Grandmother          during surgery    No Known Problems Brother     Breast cancer Neg Hx     Ovarian cancer Neg Hx        Blood pressure 122/80, pulse 78, height 5' 6 93" (1 7 m), weight 106 kg (233 lb), last menstrual period 2018, SpO2 98 %, not currently breastfeeding  and Body mass index is 36 57 kg/m²  Physical Exam   Constitutional: She is oriented to person, place, and time  She appears well-developed and well-nourished  HENT:   Head: Normocephalic and atraumatic  Eyes: Conjunctivae and EOM are normal    Neck: Normal range of motion  Neck supple  No tracheal deviation present  No thyromegaly present  Cardiovascular: Normal rate, regular rhythm and normal heart sounds  Pulmonary/Chest: Effort normal and breath sounds normal  No stridor  No respiratory distress  She has no wheezes  She has no rales  Abdominal: Soft  Bowel sounds are normal  She exhibits mass (uterus/fibroid palpated to level of umbilicus)  She exhibits no distension  There is tenderness (right lower quadrant and left lower quadrant)  There is no rebound and no guarding  Musculoskeletal: Normal range of motion  She exhibits no edema or tenderness  Lymphadenopathy:     She has no cervical adenopathy  Neurological: She is alert and oriented to person, place, and time     Skin: Skin is warm  No rash noted  No erythema  Psychiatric: She has a normal mood and affect  Her behavior is normal  Judgment and thought content normal      Breasts: breasts appear normal, no suspicious masses, no skin or nipple changes or axillary nodes  vulva: normal external genitalia for age and no lesions, masses, epithelial changes, or exudate  vagina: color pink, rugae  well formed rugae and discharge  white  cervix: nullip, no lesions  and pap obtained  uterus: anteverted, tender to manipulation, 20 wks and firm, globular and irregular  adnexa: no masses noted, right sided tenderness noted      A/P:  Pt is a 25 y o  No obstetric history on file  with      Diagnoses and all orders for this visit:    Encounter for annual routine gynecological examination    Pap smear for cervical cancer screening  -     Liquid-based pap, screening    Possible exposure to STD  -     Chlamydia/GC amplified DNA by PCR    Pelvic pain  -     oxyCODONE-acetaminophen (PERCOCET) 5-325 mg per tablet; Take 1-2 tablets by mouth every 6 (six) hours as needed for moderate pain or severe pain for up to 10 days Max Daily Amount: 8 tablets    Intramural leiomyoma of uterus    BMI 36 0-36 9,adult    Dietary calcium deficiency    Inadequate exercise      Patient advised recommendation of daily dietary calcium of  1000 mg calcium  Patient advised recommendation of exercise 5 times per week for 30 minutes  Patient advised recommendation of BMI to be between 19-25  Pt would like to proceed with hysterectomy ASAP  Reviewed with patient need for benign endometrial biopsy and benign pap  Pt will schedule EB and preoperative appointment

## 2019-01-16 ENCOUNTER — OFFICE VISIT (OUTPATIENT)
Dept: OBGYN CLINIC | Facility: CLINIC | Age: 29
End: 2019-01-16
Payer: COMMERCIAL

## 2019-01-16 VITALS
WEIGHT: 234.6 LBS | DIASTOLIC BLOOD PRESSURE: 76 MMHG | OXYGEN SATURATION: 98 % | BODY MASS INDEX: 36.82 KG/M2 | SYSTOLIC BLOOD PRESSURE: 122 MMHG | HEART RATE: 82 BPM

## 2019-01-16 DIAGNOSIS — N76.0 BACTERIAL VAGINITIS: ICD-10-CM

## 2019-01-16 DIAGNOSIS — N92.1 MENORRHAGIA WITH IRREGULAR CYCLE: ICD-10-CM

## 2019-01-16 DIAGNOSIS — D25.1 INTRAMURAL LEIOMYOMA OF UTERUS: ICD-10-CM

## 2019-01-16 DIAGNOSIS — R10.2 PELVIC PAIN: Primary | ICD-10-CM

## 2019-01-16 DIAGNOSIS — B96.89 BACTERIAL VAGINITIS: ICD-10-CM

## 2019-01-16 PROCEDURE — 99214 OFFICE O/P EST MOD 30 MIN: CPT | Performed by: OBSTETRICS & GYNECOLOGY

## 2019-01-16 RX ORDER — SODIUM CHLORIDE 9 MG/ML
125 INJECTION, SOLUTION INTRAVENOUS CONTINUOUS
Status: CANCELLED | OUTPATIENT
Start: 2019-01-16

## 2019-01-16 RX ORDER — ACETAMINOPHEN 325 MG/1
975 TABLET ORAL ONCE
Status: CANCELLED | OUTPATIENT
Start: 2019-01-16 | End: 2019-01-16

## 2019-01-16 RX ORDER — METRONIDAZOLE 500 MG/1
500 TABLET ORAL EVERY 12 HOURS SCHEDULED
Qty: 14 TABLET | Refills: 0 | Status: SHIPPED | OUTPATIENT
Start: 2019-01-16 | End: 2019-01-23

## 2019-01-16 RX ORDER — CELECOXIB 100 MG/1
200 CAPSULE ORAL ONCE
Status: CANCELLED | OUTPATIENT
Start: 2019-01-16 | End: 2019-01-16

## 2019-01-16 RX ORDER — GABAPENTIN 100 MG/1
100 CAPSULE ORAL ONCE
Status: CANCELLED | OUTPATIENT
Start: 2019-01-16 | End: 2019-01-16

## 2019-01-16 NOTE — PROGRESS NOTES
HPI:  Pt is a 25 y o  Willard Haynes with Patient's last menstrual period was 01/02/2019 (approximate)  using same sex relationship for contraception presents c/o fibroid uterus, heavy periods and painful periods  Pt reports that she has irregular menses every 30-60 days  That last 3-4 days, but she saturates a super plus tampon every 2 hours for the first day and then every 3-4 hours thereafter  She is anemic and sick of the bleeding  Additionally, patient has painful menses, but most recently was admitted to the hospital for acute pelvic pain and noted to have a 15 cm fundal fibroid with right hydrosalpinx for which she required IV narcotics  She is currently still using po narcotics and continues to have pain despite medication use  The pt reports she is in a same sex relationship and her partner has delivered 4 babies and does not desire pregnancy and desires definitive therapy  We reviewed treatment options including lysteda for heavy bleeding, hormonal management for pain and bleeding, myomectomy or total abdominal hysterectomy with bilateral salpingectomy  Pt prefers the latter and she would like the surgery as soon as possible as her pain is unbearable  PMHx:   Past Medical History:   Diagnosis Date    Asthma     Chickenpox     Seasonal allergies     Sleep apnea        PSHx:   Past Surgical History:   Procedure Laterality Date    NO PAST SURGERIES         Meds:   (Not in a hospital admission)    Allergies: Allergies   Allergen Reactions    Amoxicillin Tongue Swelling and Facial Swelling    Penicillins        Social Hx:    Social History     Social History    Marital status: Significant Other     Spouse name: Nola Medrano Number of children: 0    Years of education: HS     Occupational History    unemployed      Social History Main Topics    Smoking status: Never Smoker    Smokeless tobacco: Never Used    Alcohol use Yes      Comment: h o alcohol abuse, very rare drinking since 2015    Drug use:  Yes Frequency: 3 0 times per week     Types: Marijuana      Comment: 6 blunts/week; h o trying cocaine    Sexual activity: Yes     Partners: Female     Birth control/ protection: None      Comment: lifetime sexual partners: >50; current partner: 4 years; Pt reports she has had sex with men, but has been only have sex with women since age 10     Other Topics Concern    Not on file     Social History Narrative    Mormon: no preference    Accepts blood products        Exercise: none    Calcium: occ cheese       Ob Hx:   OB History    Para Term  AB Living   0 0 0 0 0 0   SAB TAB Ectopic Multiple Live Births   0 0 0 0 0         Obstetric Comments   Menarche: 8      Menses: 30-60/3-4/super plus tampon every 2 hours on day 1 and then every 4 hours       Gyn HX:  heavy and irregular menses and dysmenorrhea    Fm Hx:   Family History   Problem Relation Age of Onset    Colon cancer Father          age 40    Prostate cancer Father     Cirrhosis Father     Alcohol abuse Father     Sleep apnea Father     Cancer Maternal Grandmother     Other Mother         hysterectomy    No Known Problems Sister     No Known Problems Brother     Other Paternal Grandmother          during surgery    No Known Problems Brother     Breast cancer Neg Hx     Ovarian cancer Neg Hx        ROS:  Review of Systems   Constitutional: Positive for fatigue  Negative for chills, fever and unexpected weight change  HENT: Negative for congestion, mouth sores and sore throat  Respiratory: Negative for cough, chest tightness, shortness of breath and wheezing  Cardiovascular: Negative for chest pain and palpitations  Gastrointestinal: Positive for abdominal distention  Negative for abdominal pain, constipation, diarrhea, nausea and vomiting  Endocrine: Negative for cold intolerance and heat intolerance  Genitourinary: Positive for menstrual problem, pelvic pain and vaginal bleeding   Negative for dyspareunia, dysuria, genital sores, vaginal discharge and vaginal pain  Musculoskeletal: Negative for arthralgias  Skin: Negative for color change and rash  Neurological: Negative for dizziness, light-headedness and headaches  Hematological: Negative for adenopathy  VS:  /76 (BP Location: Left arm, Patient Position: Sitting, Cuff Size: Adult)   Pulse 82   Wt 106 kg (234 lb 9 6 oz)   LMP 01/02/2019 (Approximate)   SpO2 98%   BMI 36 82 kg/m²       Exam:    Physical Exam   Constitutional: She is oriented to person, place, and time  She appears well-developed and well-nourished  HENT:   Head: Normocephalic and atraumatic  Eyes: Conjunctivae and EOM are normal    Neck: Normal range of motion  Neck supple  No tracheal deviation present  No thyromegaly present  Cardiovascular: Normal rate, regular rhythm and normal heart sounds  Pulmonary/Chest: Effort normal and breath sounds normal  No stridor  Abdominal: Soft  Bowel sounds are normal  She exhibits mass (uterus/fibroid palpated to level of umbilicus)  She exhibits no distension  There is tenderness (from umbilicus to pubic bone, R>L)  There is no rebound and no guarding  Musculoskeletal: Normal range of motion  She exhibits no edema or tenderness  Lymphadenopathy:     She has no cervical adenopathy  Neurological: She is alert and oriented to person, place, and time  Skin: Skin is warm and dry  No rash noted  No erythema  Psychiatric: She has a normal mood and affect   Her behavior is normal  Judgment and thought content normal        vulva      normal external genitalia for age and no lesions, masses, epithelial changes, or exudate    vagina    color pink, rugae  well formed rugae and discharge  white    cervix     nullip and no lesions     uterus     NSSC, AF, NT, mobile, 20 wks and firm, globular and irregular    adnexa   no masses noted, tender on the right side     RV        deferred    Labs:  Lab Results   Component Value Date    WBC 9 84 01/13/2019    HGB 9 3 (L) 01/13/2019    HCT 33 2 (L) 01/13/2019     (H) 01/13/2019     Lab Results   Component Value Date    HCGQUANT <3 01/12/2019     Labs: reviewed that ch/jaden in the ED were negative  Affirm reveals BV--advised treatment with flagyl to decrease risk of cuff dehiscence  Pt agreeable    Imaging:  FINDINGS:     UTERUS:  The uterus is anteverted in position, measuring 12 4 x 4 7 x 5 7 cm  Heterogeneous with multiple fibroids noted, largest measuring 15 0 x 11 5 cm at the fundus  The cervix shows no suspicious abnormality      ENDOMETRIUM:    Normal caliber of 9 mm  Homogenous and normal in appearance      OVARIES/ADNEXA:  Right ovary:  2 8 x 3 7 x 2 6 cm  No suspicious right ovarian abnormality  Right adnexal tubular structure suspicious for hydrosalpinx  Doppler flow within normal limits      Left ovary:  3 7 x 3 4 x 2 8 cm  No suspicious left ovarian abnormality  Doppler flow within normal limits      No suspicious adnexal mass or loculated collections  Physiologic free fluid within the cul-de-sac      IMPRESSION:     1  Fibroid uterus  2   No evidence of ovarian torsion  3   Right adnexal fluid-filled tubular structure suspicious for hydrosalpinx        A/P: 25 y o  Jenniffer Radha with Patient's last menstrual period was 01/02/2019 (approximate)  with pelvic pain and fibroid uterus, heavy periods and irregular periods  for bilateral salpingectomy and ANTONIO   The risks (infection, bleeding, transfusion, damage to adjacent organs requiring immediate and/or delayed repair, clots inside blood vessels, need to complete procedure using alternative approach, procedural failure, worsening of pre-exisiting medical condition, and death) and alternatives (see outpatient record) have been discussed and patient desires to proceed with bilateral salpingectomy and ANTONIO at [facility] on 1/31/2019   Will treat BV with flagyl  Pt will have repeat CBC   If hgb <10, will start venofer preoperatively  Check HGB A1C and TSH  Follow up for EB as scheduled on Friday

## 2019-01-17 ENCOUNTER — TRANSCRIBE ORDERS (OUTPATIENT)
Dept: ADMINISTRATIVE | Facility: HOSPITAL | Age: 29
End: 2019-01-17

## 2019-01-17 ENCOUNTER — APPOINTMENT (OUTPATIENT)
Dept: LAB | Facility: HOSPITAL | Age: 29
End: 2019-01-17
Payer: COMMERCIAL

## 2019-01-17 DIAGNOSIS — D25.1 INTRAMURAL LEIOMYOMA OF UTERUS: ICD-10-CM

## 2019-01-17 DIAGNOSIS — R10.2 PELVIC PAIN: ICD-10-CM

## 2019-01-17 DIAGNOSIS — Z01.818 PREOP EXAMINATION: ICD-10-CM

## 2019-01-17 DIAGNOSIS — Z01.818 PREOP EXAMINATION: Primary | ICD-10-CM

## 2019-01-17 DIAGNOSIS — N92.1 MENORRHAGIA WITH IRREGULAR CYCLE: ICD-10-CM

## 2019-01-17 LAB
ABO GROUP BLD: NORMAL
ANISOCYTOSIS BLD QL SMEAR: PRESENT
BASOPHILS # BLD AUTO: 0 THOUSANDS/ΜL (ref 0–0.1)
BASOPHILS NFR BLD AUTO: 0 % (ref 0–1)
BLD GP AB SCN SERPL QL: NEGATIVE
C TRACH DNA SPEC QL NAA+PROBE: NEGATIVE
EOSINOPHIL # BLD AUTO: 0.2 THOUSAND/ΜL (ref 0–0.4)
EOSINOPHIL NFR BLD AUTO: 3 % (ref 0–6)
ERYTHROCYTE [DISTWIDTH] IN BLOOD BY AUTOMATED COUNT: 18.5 %
EST. AVERAGE GLUCOSE BLD GHB EST-MCNC: 100 MG/DL
HBA1C MFR BLD: 5.1 % (ref 4.2–6.3)
HCT VFR BLD AUTO: 31.2 % (ref 36–46)
HGB BLD-MCNC: 9.5 G/DL (ref 12–16)
HPV HR 12 DNA CVX QL NAA+PROBE: NEGATIVE
HPV16 DNA CVX QL NAA+PROBE: NEGATIVE
HPV18 DNA CVX QL NAA+PROBE: NEGATIVE
HYPERCHROMIA BLD QL SMEAR: PRESENT
LYMPHOCYTES # BLD AUTO: 1.8 THOUSANDS/ΜL (ref 0.5–4)
LYMPHOCYTES NFR BLD AUTO: 30 % (ref 25–45)
MCH RBC QN AUTO: 19.4 PG (ref 26–34)
MCHC RBC AUTO-ENTMCNC: 30.3 G/DL (ref 31–36)
MCV RBC AUTO: 64 FL (ref 80–100)
MICROCYTES BLD QL AUTO: PRESENT
MONOCYTES # BLD AUTO: 0.7 THOUSAND/ΜL (ref 0.2–0.9)
MONOCYTES NFR BLD AUTO: 12 % (ref 1–10)
N GONORRHOEA DNA SPEC QL NAA+PROBE: NEGATIVE
NEUTROPHILS # BLD AUTO: 3.3 THOUSANDS/ΜL (ref 1.8–7.8)
NEUTS SEG NFR BLD AUTO: 55 % (ref 45–65)
PLATELET # BLD AUTO: 553 THOUSANDS/UL (ref 150–450)
PLATELET BLD QL SMEAR: ABNORMAL
PMV BLD AUTO: 7 FL (ref 8.9–12.7)
RBC # BLD AUTO: 4.88 MILLION/UL (ref 4–5.2)
RBC MORPH BLD: ABNORMAL
RH BLD: POSITIVE
SPECIMEN EXPIRATION DATE: NORMAL
TSH SERPL DL<=0.05 MIU/L-ACNC: 1.58 UIU/ML (ref 0.47–4.68)
WBC # BLD AUTO: 6 THOUSAND/UL (ref 4.5–11)

## 2019-01-17 PROCEDURE — 84443 ASSAY THYROID STIM HORMONE: CPT

## 2019-01-17 PROCEDURE — 86900 BLOOD TYPING SEROLOGIC ABO: CPT

## 2019-01-17 PROCEDURE — 85025 COMPLETE CBC W/AUTO DIFF WBC: CPT

## 2019-01-17 PROCEDURE — 86850 RBC ANTIBODY SCREEN: CPT

## 2019-01-17 PROCEDURE — 83036 HEMOGLOBIN GLYCOSYLATED A1C: CPT

## 2019-01-17 PROCEDURE — 36415 COLL VENOUS BLD VENIPUNCTURE: CPT

## 2019-01-17 PROCEDURE — 86901 BLOOD TYPING SEROLOGIC RH(D): CPT

## 2019-01-18 ENCOUNTER — PROCEDURE VISIT (OUTPATIENT)
Dept: OBGYN CLINIC | Facility: CLINIC | Age: 29
End: 2019-01-18
Payer: COMMERCIAL

## 2019-01-18 VITALS
SYSTOLIC BLOOD PRESSURE: 122 MMHG | BODY MASS INDEX: 37.29 KG/M2 | WEIGHT: 237.6 LBS | HEART RATE: 91 BPM | DIASTOLIC BLOOD PRESSURE: 76 MMHG | OXYGEN SATURATION: 99 %

## 2019-01-18 DIAGNOSIS — N92.1 MENORRHAGIA WITH IRREGULAR CYCLE: Primary | ICD-10-CM

## 2019-01-18 PROBLEM — D50.0 ANEMIA DUE TO CHRONIC BLOOD LOSS: Status: ACTIVE | Noted: 2019-01-18

## 2019-01-18 PROCEDURE — 88305 TISSUE EXAM BY PATHOLOGIST: CPT | Performed by: PATHOLOGY

## 2019-01-18 PROCEDURE — 58100 BIOPSY OF UTERUS LINING: CPT | Performed by: OBSTETRICS & GYNECOLOGY

## 2019-01-18 NOTE — PROGRESS NOTES
Endometrial biopsy  Date/Time: 1/18/2019 10:41 AM  Performed by: Gray Nicholas by: Gaby Vences     Consent:     Consent obtained:  Written    Consent given by:  Patient    Procedural risks discussed:  Bleeding, failure rate, infection, possible continued pain and repeat procedure    Patient questions answered: yes      Patient agrees, verbalizes understanding, and wants to proceed: yes    Indication:     Indications:  Other disorder of menstruation and other abnormal bleeding from female genital tract    Pre-procedure:     Pre-procedure timeout performed: yes    Procedure:     Procedure: endometrial biopsy with Pipelle      A bivalve speculum was placed in the vagina: yes      Cervix cleaned and prepped: yes      A paracervical block was performed: no      The cervix was dilated: no      Uterus sounded: yes      Uterus sound depth (cm):  9    Specimen collected: specimen collected and sent to pathology      Patient tolerated procedure well with no complications: yes    Findings:     Uterus size:  >14 weeks    Cervix: normal

## 2019-01-21 LAB
LAB AP GYN PRIMARY INTERPRETATION: NORMAL
Lab: NORMAL
PATH INTERP SPEC-IMP: NORMAL

## 2019-01-21 RX ORDER — SODIUM CHLORIDE 9 MG/ML
20 INJECTION, SOLUTION INTRAVENOUS ONCE
Status: COMPLETED | OUTPATIENT
Start: 2019-01-22 | End: 2019-01-22

## 2019-01-22 ENCOUNTER — HOSPITAL ENCOUNTER (OUTPATIENT)
Dept: INFUSION CENTER | Facility: HOSPITAL | Age: 29
Discharge: HOME | End: 2019-01-22
Payer: COMMERCIAL

## 2019-01-22 PROCEDURE — 96365 THER/PROPH/DIAG IV INF INIT: CPT

## 2019-01-22 PROCEDURE — 96366 THER/PROPH/DIAG IV INF ADDON: CPT

## 2019-01-22 RX ADMIN — IRON SUCROSE 300 MG: 20 INJECTION, SOLUTION INTRAVENOUS at 09:23

## 2019-01-22 RX ADMIN — SODIUM CHLORIDE 20 ML/HR: 9 INJECTION, SOLUTION INTRAVENOUS at 09:10

## 2019-01-22 NOTE — PROGRESS NOTES
Patient tolerated venofer infusion, offers no complaints and is aware of upcoming appts, refuses AVS

## 2019-01-22 NOTE — PLAN OF CARE
Problem: Potential for Falls  Goal: Patient will remain free of falls  INTERVENTIONS:  - Assess patient frequently for physical needs  -  Identify cognitive and physical deficits and behaviors that affect risk of falls    -  [place] fall precautions as indicated by assessment   - Educate patient/family on patient safety including physical limitations  - Instruct patient to call for assistance with activity based on assessment  - Modify environment to reduce risk of injury  - Consider OT/PT consult to assist with strengthening/mobility   Outcome: Progressing

## 2019-01-24 RX ORDER — SODIUM CHLORIDE 9 MG/ML
20 INJECTION, SOLUTION INTRAVENOUS ONCE
Status: COMPLETED | OUTPATIENT
Start: 2019-01-25 | End: 2019-01-25

## 2019-01-25 ENCOUNTER — HOSPITAL ENCOUNTER (OUTPATIENT)
Dept: INFUSION CENTER | Facility: HOSPITAL | Age: 29
Discharge: HOME | End: 2019-01-25
Payer: COMMERCIAL

## 2019-01-25 VITALS
SYSTOLIC BLOOD PRESSURE: 134 MMHG | DIASTOLIC BLOOD PRESSURE: 67 MMHG | TEMPERATURE: 97.3 F | HEART RATE: 89 BPM | RESPIRATION RATE: 18 BRPM

## 2019-01-25 PROCEDURE — 96365 THER/PROPH/DIAG IV INF INIT: CPT

## 2019-01-25 RX ORDER — SODIUM CHLORIDE 9 MG/ML
20 INJECTION, SOLUTION INTRAVENOUS ONCE
Status: COMPLETED | OUTPATIENT
Start: 2019-01-28 | End: 2019-01-28

## 2019-01-25 RX ADMIN — IRON SUCROSE 300 MG: 20 INJECTION, SOLUTION INTRAVENOUS at 09:05

## 2019-01-25 RX ADMIN — SODIUM CHLORIDE 20 ML/HR: 9 INJECTION, SOLUTION INTRAVENOUS at 08:49

## 2019-01-25 NOTE — PROGRESS NOTES
Pt tolerated treatment well  No adverse reaction noted  Upon completion of treatment pt states her "whole arm hurts a little"  No swelling or ecchymosis noted  Continues to have good blood return from jelco  Encouraged to apply warm compresses if that seems to alleviate discomfort and to monitor for swelling or increased pain  Agreeable   discharged ambulatory with avs

## 2019-01-25 NOTE — PLAN OF CARE
Problem: Potential for Falls  Goal: Patient will remain free of falls  INTERVENTIONS:  - Assess patient frequently for physical needs  -  Identify cognitive and physical deficits and behaviors that affect risk of falls    -  [place]abella fall precautions as indicated by assessment   - Educate patient/family on patient safety including physical limitations  - Instruct patient to call for assistance with activity based on assessment  - Modify environment to reduce risk of injury  - Consider OT/PT consult to assist with strengthening/mobility   Outcome: Progressing

## 2019-01-28 ENCOUNTER — HOSPITAL ENCOUNTER (OUTPATIENT)
Dept: INFUSION CENTER | Facility: HOSPITAL | Age: 29
Discharge: HOME | End: 2019-01-28
Payer: COMMERCIAL

## 2019-01-28 VITALS
DIASTOLIC BLOOD PRESSURE: 87 MMHG | HEART RATE: 99 BPM | RESPIRATION RATE: 16 BRPM | SYSTOLIC BLOOD PRESSURE: 152 MMHG | TEMPERATURE: 96.8 F

## 2019-01-28 PROCEDURE — 96365 THER/PROPH/DIAG IV INF INIT: CPT

## 2019-01-28 PROCEDURE — 96366 THER/PROPH/DIAG IV INF ADDON: CPT

## 2019-01-28 RX ADMIN — IRON SUCROSE 300 MG: 20 INJECTION, SOLUTION INTRAVENOUS at 09:20

## 2019-01-28 RX ADMIN — SODIUM CHLORIDE 20 ML/HR: 9 INJECTION, SOLUTION INTRAVENOUS at 09:12

## 2019-01-28 NOTE — PROGRESS NOTES
Pt admitted for iv venofer  Tolerated well  No adverse reactions noted  Iv discontinued jelco intact  Discharged ambulatory with avs  Aware of wednesdays appointment

## 2019-01-29 RX ORDER — SODIUM CHLORIDE 9 MG/ML
20 INJECTION, SOLUTION INTRAVENOUS ONCE
Status: COMPLETED | OUTPATIENT
Start: 2019-01-30 | End: 2019-01-30

## 2019-01-30 ENCOUNTER — HOSPITAL ENCOUNTER (OUTPATIENT)
Dept: INFUSION CENTER | Facility: HOSPITAL | Age: 29
Discharge: HOME | End: 2019-01-30
Payer: COMMERCIAL

## 2019-01-30 ENCOUNTER — ANESTHESIA EVENT (OUTPATIENT)
Dept: PERIOP | Facility: HOSPITAL | Age: 29
DRG: 519 | End: 2019-01-30
Payer: COMMERCIAL

## 2019-01-30 ENCOUNTER — TELEPHONE (OUTPATIENT)
Dept: OBGYN CLINIC | Facility: CLINIC | Age: 29
End: 2019-01-30

## 2019-01-30 VITALS
DIASTOLIC BLOOD PRESSURE: 65 MMHG | HEART RATE: 103 BPM | RESPIRATION RATE: 16 BRPM | TEMPERATURE: 96.5 F | SYSTOLIC BLOOD PRESSURE: 142 MMHG

## 2019-01-30 PROCEDURE — 96365 THER/PROPH/DIAG IV INF INIT: CPT

## 2019-01-30 PROCEDURE — 96366 THER/PROPH/DIAG IV INF ADDON: CPT

## 2019-01-30 RX ORDER — ACETAMINOPHEN 500 MG
500-1000 TABLET ORAL EVERY 6 HOURS PRN
COMMUNITY
End: 2019-02-07

## 2019-01-30 RX ADMIN — IRON SUCROSE 300 MG: 20 INJECTION, SOLUTION INTRAVENOUS at 09:21

## 2019-01-30 RX ADMIN — SODIUM CHLORIDE 20 ML/HR: 9 INJECTION, SOLUTION INTRAVENOUS at 09:19

## 2019-01-30 NOTE — TELEPHONE ENCOUNTER
Verbal Pre auth for pts hysterectomy started on 01/28/2019  It was put in as an outpatient and finalized as outpatient (23 hours or less per Bib Gan at [facility] ) All Clinical notes were faxed to [phone] (to justin)     01/30/2019 rec'd call from pre encounter stating we would need to change the pt class to outpatient  Then rec'd call from Garth at [facility] stating it should be inpatient class and code with an inpatient auth  I called [facility] [place] back to attempt to switch the authorixation to inpatient instead of outpatient  I spoke with Sean Schwartz at [facility] who started the process in switching it to inpatient  I then called back again to get an update and spoke with Aysha Retana at [facility] who stated it was still pending at 2:30 PM    I rec'd a call from Maycol Meyer at [facility] who stated the CPT code "65045" is considered both and inpatient and an outpatient code for insurance  She stated we can have the patient put in as an outpatient and if she needs more than the 23 hours the hospital may do a conversion to inpatient at that time        I called Garth at [facility] to update her as well as Kathleen Gaming at pre admission (left her a message with information)       [address] Code [id number]

## 2019-01-30 NOTE — PRE-PROCEDURE INSTRUCTIONS
Pre-Surgery Instructions:   Medication Instructions    acetaminophen (TYLENOL) 500 mg tablet Instructed patient per Anesthesia Guidelines   albuterol (PROVENTIL HFA,VENTOLIN HFA) 90 mcg/act inhaler Instructed patient per Anesthesia Guidelines   benzonatate (TESSALON PERLES) 100 mg capsule Instructed patient per Anesthesia Guidelines   fluticasone (FLONASE) 50 mcg/act nasal spray Instructed patient per Anesthesia Guidelines   guaiFENesin (ROBITUSSIN) 100 MG/5ML oral liquid Instructed patient per Anesthesia Guidelines   loratadine (CLARITIN) 10 mg tablet Instructed patient per Anesthesia Guidelines  Instructed to use albuterol inhaler am of surgery per anesthesia

## 2019-01-30 NOTE — PLAN OF CARE
Problem: Potential for Falls  Goal: Patient will remain free of falls  INTERVENTIONS:  - Assess patient frequently for physical needs  -  Identify cognitive and physical deficits and behaviors that affect risk of falls    -  [place] fall precautions as indicated by assessment   - Educate patient/family on patient safety including physical limitations  - Instruct patient to call for assistance with activity based on assessment  - Modify environment to reduce risk of injury  - Consider OT/PT consult to assist with strengthening/mobility   Outcome: Progressing

## 2019-01-31 ENCOUNTER — HOSPITAL ENCOUNTER (INPATIENT)
Facility: HOSPITAL | Age: 29
LOS: 3 days | Discharge: HOME | DRG: 519 | End: 2019-02-03
Attending: OBSTETRICS & GYNECOLOGY | Admitting: OBSTETRICS & GYNECOLOGY
Payer: COMMERCIAL

## 2019-01-31 ENCOUNTER — ANESTHESIA (OUTPATIENT)
Dept: PERIOP | Facility: HOSPITAL | Age: 29
DRG: 519 | End: 2019-01-31
Payer: COMMERCIAL

## 2019-01-31 DIAGNOSIS — Z90.710 S/P TAH (TOTAL ABDOMINAL HYSTERECTOMY): Primary | ICD-10-CM

## 2019-01-31 DIAGNOSIS — N92.1 MENORRHAGIA WITH IRREGULAR CYCLE: ICD-10-CM

## 2019-01-31 DIAGNOSIS — R10.2 PELVIC PAIN: ICD-10-CM

## 2019-01-31 DIAGNOSIS — D25.1 INTRAMURAL LEIOMYOMA OF UTERUS: ICD-10-CM

## 2019-01-31 PROBLEM — Z90.79 STATUS POST BILATERAL SALPINGECTOMY: Status: ACTIVE | Noted: 2019-01-31

## 2019-01-31 LAB
BASE EXCESS BLDA CALC-SCNC: -1 MMOL/L (ref -2–3)
CA-I BLD-SCNC: 1.16 MMOL/L (ref 1.12–1.32)
EXT PREGNANCY TEST URINE: NEGATIVE
GLUCOSE SERPL-MCNC: 87 MG/DL (ref 65–140)
GLUCOSE SERPL-MCNC: 96 MG/DL (ref 65–140)
HCO3 BLDA-SCNC: 24.3 MMOL/L (ref 24–30)
HCT VFR BLD CALC: 32 % (ref 34.8–46.1)
HGB BLDA-MCNC: 10.9 G/DL (ref 11.5–15.4)
PCO2 BLD: 26 MMOL/L (ref 21–32)
PCO2 BLD: 41.9 MM HG (ref 42–50)
PH BLD: 7.37 [PH] (ref 7.3–7.4)
PO2 BLD: 70 MM HG (ref 35–45)
POTASSIUM BLD-SCNC: 3.5 MMOL/L (ref 3.5–5.3)
SAO2 % BLD FROM PO2: 93 % (ref 95–98)
SODIUM BLD-SCNC: 141 MMOL/L (ref 136–145)
SPECIMEN SOURCE: ABNORMAL

## 2019-01-31 PROCEDURE — 85014 HEMATOCRIT: CPT

## 2019-01-31 PROCEDURE — 0UT70ZZ RESECTION OF BILATERAL FALLOPIAN TUBES, OPEN APPROACH: ICD-10-PCS | Performed by: OBSTETRICS & GYNECOLOGY

## 2019-01-31 PROCEDURE — 84132 ASSAY OF SERUM POTASSIUM: CPT

## 2019-01-31 PROCEDURE — 88305 TISSUE EXAM BY PATHOLOGIST: CPT | Performed by: PATHOLOGY

## 2019-01-31 PROCEDURE — 82330 ASSAY OF CALCIUM: CPT

## 2019-01-31 PROCEDURE — 94762 N-INVAS EAR/PLS OXIMTRY CONT: CPT

## 2019-01-31 PROCEDURE — 88307 TISSUE EXAM BY PATHOLOGIST: CPT | Performed by: PATHOLOGY

## 2019-01-31 PROCEDURE — 81025 URINE PREGNANCY TEST: CPT | Performed by: OBSTETRICS & GYNECOLOGY

## 2019-01-31 PROCEDURE — 0UT90ZZ RESECTION OF UTERUS, OPEN APPROACH: ICD-10-PCS | Performed by: OBSTETRICS & GYNECOLOGY

## 2019-01-31 PROCEDURE — 82947 ASSAY GLUCOSE BLOOD QUANT: CPT

## 2019-01-31 PROCEDURE — 84295 ASSAY OF SERUM SODIUM: CPT

## 2019-01-31 PROCEDURE — 82948 REAGENT STRIP/BLOOD GLUCOSE: CPT

## 2019-01-31 PROCEDURE — 58150 TOTAL HYSTERECTOMY: CPT | Performed by: OBSTETRICS & GYNECOLOGY

## 2019-01-31 PROCEDURE — 82803 BLOOD GASES ANY COMBINATION: CPT

## 2019-01-31 RX ORDER — ROPIVACAINE HYDROCHLORIDE 5 MG/ML
INJECTION, SOLUTION EPIDURAL; INFILTRATION; PERINEURAL AS NEEDED
Status: DISCONTINUED | OUTPATIENT
Start: 2019-01-31 | End: 2019-01-31 | Stop reason: SURG

## 2019-01-31 RX ORDER — DOCUSATE SODIUM 100 MG/1
100 CAPSULE, LIQUID FILLED ORAL 2 TIMES DAILY
Status: DISCONTINUED | OUTPATIENT
Start: 2019-01-31 | End: 2019-02-03 | Stop reason: HOSPADM

## 2019-01-31 RX ORDER — IBUPROFEN 600 MG/1
600 TABLET ORAL EVERY 6 HOURS SCHEDULED
Status: DISCONTINUED | OUTPATIENT
Start: 2019-01-31 | End: 2019-02-01

## 2019-01-31 RX ORDER — LIDOCAINE HYDROCHLORIDE 10 MG/ML
INJECTION, SOLUTION INFILTRATION; PERINEURAL AS NEEDED
Status: DISCONTINUED | OUTPATIENT
Start: 2019-01-31 | End: 2019-01-31 | Stop reason: SURG

## 2019-01-31 RX ORDER — HYDROMORPHONE HCL/PF 1 MG/ML
0.5 SYRINGE (ML) INJECTION
Status: COMPLETED | OUTPATIENT
Start: 2019-01-31 | End: 2019-01-31

## 2019-01-31 RX ORDER — LORATADINE 10 MG/1
10 TABLET ORAL DAILY
Status: DISCONTINUED | OUTPATIENT
Start: 2019-02-01 | End: 2019-02-03 | Stop reason: HOSPADM

## 2019-01-31 RX ORDER — GLYCOPYRROLATE 0.2 MG/ML
INJECTION INTRAMUSCULAR; INTRAVENOUS AS NEEDED
Status: DISCONTINUED | OUTPATIENT
Start: 2019-01-31 | End: 2019-01-31 | Stop reason: SURG

## 2019-01-31 RX ORDER — MIDAZOLAM HYDROCHLORIDE 1 MG/ML
INJECTION INTRAMUSCULAR; INTRAVENOUS AS NEEDED
Status: DISCONTINUED | OUTPATIENT
Start: 2019-01-31 | End: 2019-01-31 | Stop reason: SURG

## 2019-01-31 RX ORDER — NEOSTIGMINE METHYLSULFATE 1 MG/ML
INJECTION INTRAVENOUS AS NEEDED
Status: DISCONTINUED | OUTPATIENT
Start: 2019-01-31 | End: 2019-01-31 | Stop reason: SURG

## 2019-01-31 RX ORDER — ONDANSETRON 2 MG/ML
4 INJECTION INTRAMUSCULAR; INTRAVENOUS ONCE AS NEEDED
Status: DISCONTINUED | OUTPATIENT
Start: 2019-01-31 | End: 2019-01-31 | Stop reason: HOSPADM

## 2019-01-31 RX ORDER — ALBUTEROL SULFATE 90 UG/1
2 AEROSOL, METERED RESPIRATORY (INHALATION) EVERY 4 HOURS PRN
Status: DISCONTINUED | OUTPATIENT
Start: 2019-01-31 | End: 2019-02-03 | Stop reason: HOSPADM

## 2019-01-31 RX ORDER — CLINDAMYCIN PHOSPHATE 900 MG/50ML
900 INJECTION INTRAVENOUS ONCE
Status: COMPLETED | OUTPATIENT
Start: 2019-01-31 | End: 2019-01-31

## 2019-01-31 RX ORDER — FENTANYL CITRATE/PF 50 MCG/ML
25 SYRINGE (ML) INJECTION
Status: COMPLETED | OUTPATIENT
Start: 2019-01-31 | End: 2019-01-31

## 2019-01-31 RX ORDER — DEXTROSE, SODIUM CHLORIDE, AND POTASSIUM CHLORIDE 5; .45; .15 G/100ML; G/100ML; G/100ML
125 INJECTION INTRAVENOUS CONTINUOUS
Status: DISCONTINUED | OUTPATIENT
Start: 2019-01-31 | End: 2019-02-01

## 2019-01-31 RX ORDER — SODIUM CHLORIDE 9 MG/ML
125 INJECTION, SOLUTION INTRAVENOUS CONTINUOUS
Status: DISCONTINUED | OUTPATIENT
Start: 2019-01-31 | End: 2019-01-31

## 2019-01-31 RX ORDER — PROPOFOL 10 MG/ML
INJECTION, EMULSION INTRAVENOUS AS NEEDED
Status: DISCONTINUED | OUTPATIENT
Start: 2019-01-31 | End: 2019-01-31 | Stop reason: SURG

## 2019-01-31 RX ORDER — ACETAMINOPHEN 325 MG/1
975 TABLET ORAL ONCE
Status: COMPLETED | OUTPATIENT
Start: 2019-01-31 | End: 2019-01-31

## 2019-01-31 RX ORDER — CELECOXIB 200 MG/1
200 CAPSULE ORAL ONCE
Status: COMPLETED | OUTPATIENT
Start: 2019-01-31 | End: 2019-01-31

## 2019-01-31 RX ORDER — GABAPENTIN 100 MG/1
100 CAPSULE ORAL ONCE
Status: COMPLETED | OUTPATIENT
Start: 2019-01-31 | End: 2019-01-31

## 2019-01-31 RX ORDER — ONDANSETRON 2 MG/ML
INJECTION INTRAMUSCULAR; INTRAVENOUS AS NEEDED
Status: DISCONTINUED | OUTPATIENT
Start: 2019-01-31 | End: 2019-01-31 | Stop reason: SURG

## 2019-01-31 RX ORDER — FENTANYL CITRATE 50 UG/ML
INJECTION, SOLUTION INTRAMUSCULAR; INTRAVENOUS AS NEEDED
Status: DISCONTINUED | OUTPATIENT
Start: 2019-01-31 | End: 2019-01-31 | Stop reason: SURG

## 2019-01-31 RX ORDER — ACETAMINOPHEN 325 MG/1
650 TABLET ORAL EVERY 6 HOURS SCHEDULED
Status: DISCONTINUED | OUTPATIENT
Start: 2019-01-31 | End: 2019-02-03 | Stop reason: HOSPADM

## 2019-01-31 RX ORDER — FLUTICASONE PROPIONATE 50 MCG
1 SPRAY, SUSPENSION (ML) NASAL DAILY PRN
Status: DISCONTINUED | OUTPATIENT
Start: 2019-01-31 | End: 2019-02-03 | Stop reason: HOSPADM

## 2019-01-31 RX ORDER — MAGNESIUM HYDROXIDE 1200 MG/15ML
LIQUID ORAL AS NEEDED
Status: DISCONTINUED | OUTPATIENT
Start: 2019-01-31 | End: 2019-01-31 | Stop reason: HOSPADM

## 2019-01-31 RX ORDER — ONDANSETRON 2 MG/ML
4 INJECTION INTRAMUSCULAR; INTRAVENOUS EVERY 4 HOURS PRN
Status: DISCONTINUED | OUTPATIENT
Start: 2019-01-31 | End: 2019-02-03 | Stop reason: HOSPADM

## 2019-01-31 RX ORDER — HYDROMORPHONE HCL/PF 1 MG/ML
SYRINGE (ML) INJECTION AS NEEDED
Status: DISCONTINUED | OUTPATIENT
Start: 2019-01-31 | End: 2019-01-31 | Stop reason: SURG

## 2019-01-31 RX ORDER — ROCURONIUM BROMIDE 10 MG/ML
INJECTION, SOLUTION INTRAVENOUS AS NEEDED
Status: DISCONTINUED | OUTPATIENT
Start: 2019-01-31 | End: 2019-01-31 | Stop reason: SURG

## 2019-01-31 RX ADMIN — CLINDAMYCIN PHOSPHATE 900 MG: 18 INJECTION, SOLUTION INTRAMUSCULAR; INTRAVENOUS at 13:15

## 2019-01-31 RX ADMIN — LIDOCAINE HYDROCHLORIDE 100 MG: 10 INJECTION, SOLUTION INFILTRATION; PERINEURAL at 13:04

## 2019-01-31 RX ADMIN — ROCURONIUM BROMIDE 5 MG: 10 INJECTION INTRAVENOUS at 14:57

## 2019-01-31 RX ADMIN — FENTANYL CITRATE 100 MCG: 50 INJECTION, SOLUTION INTRAMUSCULAR; INTRAVENOUS at 14:15

## 2019-01-31 RX ADMIN — CELECOXIB 200 MG: 200 CAPSULE ORAL at 12:08

## 2019-01-31 RX ADMIN — ACETAMINOPHEN 975 MG: 325 TABLET, FILM COATED ORAL at 12:08

## 2019-01-31 RX ADMIN — SODIUM CHLORIDE 125 ML/HR: 0.9 INJECTION, SOLUTION INTRAVENOUS at 19:30

## 2019-01-31 RX ADMIN — SODIUM CHLORIDE 125 ML/HR: 0.9 INJECTION, SOLUTION INTRAVENOUS at 17:42

## 2019-01-31 RX ADMIN — DEXAMETHASONE SODIUM PHOSPHATE 4 MG: 10 INJECTION INTRAMUSCULAR; INTRAVENOUS at 15:16

## 2019-01-31 RX ADMIN — ROCURONIUM BROMIDE 50 MG: 10 INJECTION INTRAVENOUS at 13:04

## 2019-01-31 RX ADMIN — GENTAMICIN SULFATE 40 MG: 40 INJECTION, SOLUTION INTRAMUSCULAR; INTRAVENOUS at 13:10

## 2019-01-31 RX ADMIN — DOCUSATE SODIUM 100 MG: 100 CAPSULE, LIQUID FILLED ORAL at 22:26

## 2019-01-31 RX ADMIN — SODIUM CHLORIDE: 0.9 INJECTION, SOLUTION INTRAVENOUS at 15:11

## 2019-01-31 RX ADMIN — HYDROMORPHONE HYDROCHLORIDE 2 MG: 1 INJECTION, SOLUTION INTRAMUSCULAR; INTRAVENOUS; SUBCUTANEOUS at 15:52

## 2019-01-31 RX ADMIN — SODIUM CHLORIDE 125 ML/HR: 0.9 INJECTION, SOLUTION INTRAVENOUS at 12:04

## 2019-01-31 RX ADMIN — FENTANYL CITRATE 25 MCG: 50 INJECTION, SOLUTION INTRAMUSCULAR; INTRAVENOUS at 17:09

## 2019-01-31 RX ADMIN — DEXTROSE, SODIUM CHLORIDE, AND POTASSIUM CHLORIDE 125 ML/HR: 5; .45; .15 INJECTION INTRAVENOUS at 20:08

## 2019-01-31 RX ADMIN — ROPIVACAINE HYDROCHLORIDE 15 ML: 5 INJECTION, SOLUTION EPIDURAL; INFILTRATION; PERINEURAL at 16:31

## 2019-01-31 RX ADMIN — SODIUM CHLORIDE: 0.9 INJECTION, SOLUTION INTRAVENOUS at 14:06

## 2019-01-31 RX ADMIN — IBUPROFEN 600 MG: 600 TABLET ORAL at 22:26

## 2019-01-31 RX ADMIN — HYDROMORPHONE HYDROCHLORIDE 0.5 MG: 1 INJECTION, SOLUTION INTRAMUSCULAR; INTRAVENOUS; SUBCUTANEOUS at 18:36

## 2019-01-31 RX ADMIN — ROPIVACAINE HYDROCHLORIDE 15 ML: 5 INJECTION, SOLUTION EPIDURAL; INFILTRATION; PERINEURAL at 16:35

## 2019-01-31 RX ADMIN — GLYCOPYRROLATE 0.6 MG: 0.2 INJECTION, SOLUTION INTRAMUSCULAR; INTRAVENOUS at 15:52

## 2019-01-31 RX ADMIN — HYDROMORPHONE HYDROCHLORIDE 0.5 MG: 1 INJECTION, SOLUTION INTRAMUSCULAR; INTRAVENOUS; SUBCUTANEOUS at 17:25

## 2019-01-31 RX ADMIN — HYDROMORPHONE HYDROCHLORIDE 0.5 MG: 1 INJECTION, SOLUTION INTRAMUSCULAR; INTRAVENOUS; SUBCUTANEOUS at 18:10

## 2019-01-31 RX ADMIN — FENTANYL CITRATE 100 MCG: 50 INJECTION, SOLUTION INTRAMUSCULAR; INTRAVENOUS at 13:04

## 2019-01-31 RX ADMIN — ONDANSETRON 4 MG: 2 INJECTION INTRAMUSCULAR; INTRAVENOUS at 15:16

## 2019-01-31 RX ADMIN — PROPOFOL 200 MG: 10 INJECTION, EMULSION INTRAVENOUS at 13:04

## 2019-01-31 RX ADMIN — ACETAMINOPHEN 650 MG: 325 TABLET, FILM COATED ORAL at 22:26

## 2019-01-31 RX ADMIN — NEOSTIGMINE METHYLSULFATE 4 MG: 1 INJECTION INTRAVENOUS at 15:52

## 2019-01-31 RX ADMIN — ROCURONIUM BROMIDE 10 MG: 10 INJECTION INTRAVENOUS at 14:01

## 2019-01-31 RX ADMIN — HYDROMORPHONE HYDROCHLORIDE 0.5 MG: 1 INJECTION, SOLUTION INTRAMUSCULAR; INTRAVENOUS; SUBCUTANEOUS at 17:36

## 2019-01-31 RX ADMIN — GABAPENTIN 100 MG: 100 CAPSULE ORAL at 12:08

## 2019-01-31 RX ADMIN — ROCURONIUM BROMIDE 5 MG: 10 INJECTION INTRAVENOUS at 14:26

## 2019-01-31 RX ADMIN — MIDAZOLAM 2 MG: 1 INJECTION INTRAMUSCULAR; INTRAVENOUS at 13:04

## 2019-01-31 RX ADMIN — FENTANYL CITRATE 25 MCG: 50 INJECTION, SOLUTION INTRAMUSCULAR; INTRAVENOUS at 16:58

## 2019-01-31 RX ADMIN — FENTANYL CITRATE 25 MCG: 50 INJECTION, SOLUTION INTRAMUSCULAR; INTRAVENOUS at 17:14

## 2019-01-31 RX ADMIN — HYDROMORPHONE HYDROCHLORIDE: 10 INJECTION, SOLUTION INTRAMUSCULAR; INTRAVENOUS; SUBCUTANEOUS at 17:37

## 2019-01-31 RX ADMIN — FENTANYL CITRATE 25 MCG: 50 INJECTION, SOLUTION INTRAMUSCULAR; INTRAVENOUS at 17:19

## 2019-01-31 NOTE — H&P (VIEW-ONLY)
HPI:  Pt is a 25 y o  Francie Cancer with Patient's last menstrual period was 01/02/2019 (approximate)  using same sex relationship for contraception presents c/o fibroid uterus, heavy periods and painful periods  Pt reports that she has irregular menses every 30-60 days  That last 3-4 days, but she saturates a super plus tampon every 2 hours for the first day and then every 3-4 hours thereafter  She is anemic and sick of the bleeding  Additionally, patient has painful menses, but most recently was admitted to the hospital for acute pelvic pain and noted to have a 15 cm fundal fibroid with right hydrosalpinx for which she required IV narcotics  She is currently still using po narcotics and continues to have pain despite medication use  The pt reports she is in a same sex relationship and her partner has delivered 4 babies and does not desire pregnancy and desires definitive therapy  We reviewed treatment options including lysteda for heavy bleeding, hormonal management for pain and bleeding, myomectomy or total abdominal hysterectomy with bilateral salpingectomy  Pt prefers the latter and she would like the surgery as soon as possible as her pain is unbearable  PMHx:   Past Medical History:   Diagnosis Date    Asthma     Chickenpox     Seasonal allergies     Sleep apnea        PSHx:   Past Surgical History:   Procedure Laterality Date    NO PAST SURGERIES         Meds:   (Not in a hospital admission)    Allergies: Allergies   Allergen Reactions    Amoxicillin Tongue Swelling and Facial Swelling    Penicillins        Social Hx:    Social History     Social History    Marital status: Significant Other     Spouse name: Alexandru Sherwood Number of children: 0    Years of education: HS     Occupational History    unemployed      Social History Main Topics    Smoking status: Never Smoker    Smokeless tobacco: Never Used    Alcohol use Yes      Comment: h o alcohol abuse, very rare drinking since 2015    Drug use:  Yes Frequency: 3 0 times per week     Types: Marijuana      Comment: 6 blunts/week; h o trying cocaine    Sexual activity: Yes     Partners: Female     Birth control/ protection: None      Comment: lifetime sexual partners: >50; current partner: 4 years; Pt reports she has had sex with men, but has been only have sex with women since age 10     Other Topics Concern    Not on file     Social History Narrative    Zoroastrianism: no preference    Accepts blood products        Exercise: none    Calcium: occ cheese       Ob Hx:   OB History    Para Term  AB Living   0 0 0 0 0 0   SAB TAB Ectopic Multiple Live Births   0 0 0 0 0         Obstetric Comments   Menarche: 8      Menses: 30-60/3-4/super plus tampon every 2 hours on day 1 and then every 4 hours       Gyn HX:  heavy and irregular menses and dysmenorrhea    Fm Hx:   Family History   Problem Relation Age of Onset    Colon cancer Father          age 40    Prostate cancer Father     Cirrhosis Father     Alcohol abuse Father     Sleep apnea Father     Cancer Maternal Grandmother     Other Mother         hysterectomy    No Known Problems Sister     No Known Problems Brother     Other Paternal Grandmother          during surgery    No Known Problems Brother     Breast cancer Neg Hx     Ovarian cancer Neg Hx        ROS:  Review of Systems   Constitutional: Positive for fatigue  Negative for chills, fever and unexpected weight change  HENT: Negative for congestion, mouth sores and sore throat  Respiratory: Negative for cough, chest tightness, shortness of breath and wheezing  Cardiovascular: Negative for chest pain and palpitations  Gastrointestinal: Positive for abdominal distention  Negative for abdominal pain, constipation, diarrhea, nausea and vomiting  Endocrine: Negative for cold intolerance and heat intolerance  Genitourinary: Positive for menstrual problem, pelvic pain and vaginal bleeding   Negative for dyspareunia, dysuria, genital sores, vaginal discharge and vaginal pain  Musculoskeletal: Negative for arthralgias  Skin: Negative for color change and rash  Neurological: Negative for dizziness, light-headedness and headaches  Hematological: Negative for adenopathy  VS:  /76 (BP Location: Left arm, Patient Position: Sitting, Cuff Size: Adult)   Pulse 82   Wt 106 kg (234 lb 9 6 oz)   LMP 01/02/2019 (Approximate)   SpO2 98%   BMI 36 82 kg/m²       Exam:    Physical Exam   Constitutional: She is oriented to person, place, and time  She appears well-developed and well-nourished  HENT:   Head: Normocephalic and atraumatic  Eyes: Conjunctivae and EOM are normal    Neck: Normal range of motion  Neck supple  No tracheal deviation present  No thyromegaly present  Cardiovascular: Normal rate, regular rhythm and normal heart sounds  Pulmonary/Chest: Effort normal and breath sounds normal  No stridor  Abdominal: Soft  Bowel sounds are normal  She exhibits mass (uterus/fibroid palpated to level of umbilicus)  She exhibits no distension  There is tenderness (from umbilicus to pubic bone, R>L)  There is no rebound and no guarding  Musculoskeletal: Normal range of motion  She exhibits no edema or tenderness  Lymphadenopathy:     She has no cervical adenopathy  Neurological: She is alert and oriented to person, place, and time  Skin: Skin is warm and dry  No rash noted  No erythema  Psychiatric: She has a normal mood and affect   Her behavior is normal  Judgment and thought content normal        vulva      normal external genitalia for age and no lesions, masses, epithelial changes, or exudate    vagina    color pink, rugae  well formed rugae and discharge  white    cervix     nullip and no lesions     uterus     NSSC, AF, NT, mobile, 20 wks and firm, globular and irregular    adnexa   no masses noted, tender on the right side     RV        deferred    Labs:  Lab Results   Component Value Date    WBC 9 84 01/13/2019    HGB 9 3 (L) 01/13/2019    HCT 33 2 (L) 01/13/2019     (H) 01/13/2019     Lab Results   Component Value Date    HCGQUANT <3 01/12/2019     Labs: reviewed that ch/jaden in the ED were negative  Affirm reveals BV--advised treatment with flagyl to decrease risk of cuff dehiscence  Pt agreeable    Imaging:  FINDINGS:     UTERUS:  The uterus is anteverted in position, measuring 12 4 x 4 7 x 5 7 cm  Heterogeneous with multiple fibroids noted, largest measuring 15 0 x 11 5 cm at the fundus  The cervix shows no suspicious abnormality      ENDOMETRIUM:    Normal caliber of 9 mm  Homogenous and normal in appearance      OVARIES/ADNEXA:  Right ovary:  2 8 x 3 7 x 2 6 cm  No suspicious right ovarian abnormality  Right adnexal tubular structure suspicious for hydrosalpinx  Doppler flow within normal limits      Left ovary:  3 7 x 3 4 x 2 8 cm  No suspicious left ovarian abnormality  Doppler flow within normal limits      No suspicious adnexal mass or loculated collections  Physiologic free fluid within the cul-de-sac      IMPRESSION:     1  Fibroid uterus  2   No evidence of ovarian torsion  3   Right adnexal fluid-filled tubular structure suspicious for hydrosalpinx        A/P: 25 y o  Francie Cancer with Patient's last menstrual period was 01/02/2019 (approximate)  with pelvic pain and fibroid uterus, heavy periods and irregular periods  for bilateral salpingectomy and ANTONIO   The risks (infection, bleeding, transfusion, damage to adjacent organs requiring immediate and/or delayed repair, clots inside blood vessels, need to complete procedure using alternative approach, procedural failure, worsening of pre-exisiting medical condition, and death) and alternatives (see outpatient record) have been discussed and patient desires to proceed with bilateral salpingectomy and ANTONIO at [facility] on 1/31/2019   Will treat BV with flagyl  Pt will have repeat CBC   If hgb <10, will start venofer preoperatively  Check HGB A1C and TSH  Follow up for EB as scheduled on Friday

## 2019-01-31 NOTE — OP NOTE
OPERATIVE REPORT  PATIENT NAME: Galo Mathias    :  1990  MRN: [id number]  Pt Location: [facility] OR ROOM 03    SURGERY DATE: 2019    Surgeon(s) and Role:     * Dante Acosta MD - Primary     * Thai Peters MD - Assisting    Preop Diagnosis:  Pelvic pain [R10 2]  Intramural leiomyoma of uterus [D25 1]  Menorrhagia with irregular cycle [N92 1]    Post-Op Diagnosis Codes:     * Pelvic pain [R10 2]     * Intramural leiomyoma of uterus [D25 1]     * Menorrhagia with irregular cycle [N92 1]    Procedure(s) (LRB):  HYSTERECTOMY TOTAL ABDOMINAL (ANTONIO) (N/A)  SALPINGECTOMY (Bilateral)    Specimen(s):  ID Type Source Tests Collected by Time Destination   1 : Uterine Fibroid Tissue Myomectomy w/o Uterus TISSUE EXAM Dante Acosta MD 2019 1412    2 : Uterus, cervix, bilateral fallopian tubes Tissue Uterus TISSUE EXAM Dante Acosta MD 2019 1412        Estimated Blood Loss:   450 mL    Drains:  Urethral Catheter Double-lumen;Non-latex 16 Fr  (Active)   Number of days: 0       Anesthesia Type:   GETA    Operative Indications:  Pelvic pain [R10 2]  Intramural leiomyoma of uterus [D25 1]  Menorrhagia with irregular cycle [N801]    25year-old G0 history of dysmenorrhea, heavy menstrual bleeding, and enlarged fibroid uterus measuring approximately 22 weeks in size presenting for scheduled surgical treatment via total abdominal hysterectomy and bilateral salpingectomy  Operative Findings:  1  External genitalia grossly normal in appearance  No lacerations, no lesions, no ulcerations visualized  Bimanual exam revealed cervix normal without any abnormal contours, enlarged mobile uterus measuring approximately 22-24 weeks in size  Abnormal contours of the uterus palpated due to multiple fibroids  No adnexal masses palpated bilaterally  2  Abdominal exploratory laparotomy revealed multiple large fibroids coming together for large fundal mass extruding from uterus    Uterus normal in size with normal contours with distinct connection of large fibroid mass at fundus  Right fallopian tube enlarged and engorged representing hydrosalpinx with clotted blood within the fallopian tube  Bilateral ovaries grossly normal in appearance  3  Examination of the abdominal cavity at the end of the procedure revealed no signs of injury to abdominal organs including bowel and vasculature  Complications:   None Apparent    Procedure and Technique:  The patient was properly identified in the holding area by the operating room staff and attending physician  She was taken to the operating room where general anesthesia was induced without complications  She was placed in the supine position with padding under the legs to avoid injury or pressure  A yeager catheter was placed and SCDs placed bilaterally and turned on  The patient was prepped and draped in normal sterile fashion  A time-out was taken and she was again properly identified by the operating room staff and the attending physician  A vertical skin incision was made with a scalpel and then dissection was carried over subsequent layers to the fascia using bovie electrocautery  Bovie cautery was used for hemostasis  The fascia was incised the midline and the fascial incision was extended with bovie electrocautery  The rectus muscles were  midline, peritoneum was identified and was entered sharply  A large Forest-O retractor was inserted into the abdomen for retraction  The bowel was packed with moist lap sponges  The uterus was identified and externalized from the pelvis  Examination revealed the above mentioned findings  After examination of the uterus with a distinct plane between the fibroid and the fundus of the uterus decision was made to proceed with a myomectomy prior to starting hysterectomy  Vasopressin was injected under the uterine serosa being careful to avoid uterine vasculature circumferentially around the fundal extrusion of the fibroid    Kristian electrocautery was used to then cauterize and ligate the fibroid off of the fundus of the uterus and passed off to pathology  At this time we proceeded with the hysterectomy and bilateral salpingectomy  First on the left side of the uterus, the uteroovarian ligament was clamped with Paty clamps x2, cut and ligated with 0 Vicryl suture  The round ligament was grasped with a Carey and 2 sutures were placed with 0 Vicryl suture to ligate the round ligament  The round ligament was then cut with Metzenbaum scissors to create an avascular window was in the broad ligament  We clamped, transected and ligated the round ligament on the right side and developed the bladder flap by transecting the anterior leaf of the broad ligament and transecting towards the anterior cuff  Skeletonization of the uterine arteries was performed by dissecting the anterior and posterior broad ligament without complications  Once we were at the level of the uterine artery, the cardinal ligament and vessel were clamped, transected and ligated with 0-vicryl suture  We continued in this fashion down through the uterosacral ligaments  The contralateral side with the exact same sequence of steps  Bilateral fallopian tubes were grasped with Carey clamps with Paty clamps coming across the mesosalpinx  The fallopian tube was then cut and suture ligated with 0 Vicryl suture bilaterally  Again the above findings were noted  Once at the level of the cervix right-angle Zepplin-style clamps were used across the vaginal cuff at the junction with the cervix and Rose Mariea Early were then used to transect the uterus and cervix from the vaginal cuff  After uterus with cervix was removed, we proceeded to vaginal closure of the cuff   0 Vicryl sutures were placed with a Jak stitch her on each right angle zeppelin style clamp bilaterally    Interrupted figure of 8 stitches were then placed along the vaginal cuff and good hemostasis was achieved  The adnexal pedicles were visualized and hemostasis confirmed  We then irrigated the pelvis with warm sterile water  At this time attention was then turned for closure of the incision  Gowns and gloves of all surgeons and all instruments were exchanged at this time  Sterile towels were placed over the previous sterile dressing to start the closure of the fascial incision  The fascial incision was then closed using 1 PDS Loop suture in a running nonlocked fashion from both anterior and posterior angles  The subcutaneous adipose was closed with 2-0 Vicryl suture and the skin incision was closed using 4-0 Monocryl suture in a running subcuticular stitch  Histacryl was applied  All needle, sponge and instrument counts were confirmed to be correct post operatively  Patient Disposition:  PACU  and extubated and stable    SIGNATURE: Luca Ledezma MD  DATE: January 31, 2019  TIME: 4:53 PM    I agree with the operative report as dictated by Dr Beka Carbajal and edited by me  I was present for the entire procedure    Robert Munguia MD

## 2019-01-31 NOTE — ANESTHESIA PROCEDURE NOTES
Peripheral Block    Patient location during procedure: OR  Start time: 1/31/2019 4:30 PM  Reason for block: at surgeon's request and post-op pain management  Staffing  Anesthesiologist: Ronald Melgar  Performed: anesthesiologist   Preanesthetic Checklist  Completed: patient identified, site marked, surgical consent, pre-op evaluation, timeout performed, IV checked, risks and benefits discussed and monitors and equipment checked  Peripheral Block  Patient position: supine  Prep: ChloraPrep  Patient monitoring: frequent blood pressure checks, continuous pulse ox, cardiac monitor and heart rate  Block type: TAP  Laterality: bilateral  Injection technique: single-shot  Procedures: ultrasound guided  Ultrasound permanent image saved  Needle  Needle type: Stimuplex   Needle gauge: 21 G  Needle length: 10 cm  Needle localization: ultrasound guidance  Test dose: negative  Assessment  Injection assessment: incremental injection, local visualized surrounding nerve on ultrasound and negative aspiration for heme  Paresthesia pain: none  Heart rate change: no  Slow fractionated injection: yes  Post-procedure:  site cleaned  patient tolerated the procedure well with no immediate complications

## 2019-01-31 NOTE — ANESTHESIA PREPROCEDURE EVALUATION
Review of Systems/Medical History  Patient summary reviewed  Chart reviewed  No history of anesthetic complications     Cardiovascular  Negative cardio ROS    Pulmonary  Asthma ,        GI/Hepatic  Negative GI/hepatic ROS          Negative  ROS        Endo/Other    Obesity  morbid obesity   GYN      Comment: Large fibroid      Hematology  Anemia ,     Musculoskeletal  Negative musculoskeletal ROS        Neurology      Comment: Marijuana use last was yesterday     Very remote hx of cocaine use  Many years ago  Psychology   Anxiety, Depression ,              Physical Exam    Airway    Mallampati score: II  TM Distance: >3 FB  Neck ROM: full     Dental   No notable dental hx     Cardiovascular  Comment: Negative ROS, Rhythm: regular, Rate: normal, Cardiovascular exam normal    Pulmonary  Pulmonary exam normal Breath sounds clear to auscultation,     Other Findings        Anesthesia Plan  ASA Score- 2     Anesthesia Type- general with ASA Monitors  Additional Monitors:   Airway Plan: ETT  Comment: 2 nd IV after induction     TAP block if ok with Gyn     Check to see I T/S still active  Plan Factors- Patient instructed to abstain from smoking on day of procedure       Induction- intravenous  Postoperative Plan- Plan for postoperative opioid use  Planned trial extubation    Informed Consent- Anesthetic plan and risks discussed with patient

## 2019-02-01 LAB
ANION GAP SERPL CALCULATED.3IONS-SCNC: 6 MMOL/L (ref 4–13)
ANION GAP SERPL CALCULATED.3IONS-SCNC: 7 MMOL/L (ref 4–13)
BUN SERPL-MCNC: 6 MG/DL (ref 5–25)
BUN SERPL-MCNC: 6 MG/DL (ref 5–25)
CALCIUM SERPL-MCNC: 7.3 MG/DL (ref 8.3–10.1)
CALCIUM SERPL-MCNC: 8.2 MG/DL (ref 8.3–10.1)
CHLORIDE SERPL-SCNC: 102 MMOL/L (ref 100–108)
CHLORIDE SERPL-SCNC: 104 MMOL/L (ref 100–108)
CO2 SERPL-SCNC: 23 MMOL/L (ref 21–32)
CO2 SERPL-SCNC: 25 MMOL/L (ref 21–32)
CREAT SERPL-MCNC: 0.55 MG/DL (ref 0.6–1.3)
CREAT SERPL-MCNC: 0.63 MG/DL (ref 0.6–1.3)
ERYTHROCYTE [DISTWIDTH] IN BLOOD BY AUTOMATED COUNT: 23.5 % (ref 11.6–15.1)
GFR SERPL CREATININE-BSD FRML MDRD: 139 ML/MIN/1.73SQ M
GFR SERPL CREATININE-BSD FRML MDRD: 146 ML/MIN/1.73SQ M
GLUCOSE SERPL-MCNC: 101 MG/DL (ref 65–140)
GLUCOSE SERPL-MCNC: 434 MG/DL (ref 65–140)
HCT VFR BLD AUTO: 33.3 % (ref 34.8–46.1)
HGB BLD-MCNC: 9.5 G/DL (ref 11.5–15.4)
MCH RBC QN AUTO: 20.8 PG (ref 26.8–34.3)
MCHC RBC AUTO-ENTMCNC: 28.5 G/DL (ref 31.4–37.4)
MCV RBC AUTO: 73 FL (ref 82–98)
PLATELET # BLD AUTO: 455 THOUSANDS/UL (ref 149–390)
PMV BLD AUTO: 8.3 FL (ref 8.9–12.7)
POTASSIUM SERPL-SCNC: 3.6 MMOL/L (ref 3.5–5.3)
POTASSIUM SERPL-SCNC: 5.5 MMOL/L (ref 3.5–5.3)
RBC # BLD AUTO: 4.56 MILLION/UL (ref 3.81–5.12)
SODIUM SERPL-SCNC: 131 MMOL/L (ref 136–145)
SODIUM SERPL-SCNC: 136 MMOL/L (ref 136–145)
WBC # BLD AUTO: 13.41 THOUSAND/UL (ref 4.31–10.16)

## 2019-02-01 PROCEDURE — 80048 BASIC METABOLIC PNL TOTAL CA: CPT | Performed by: OBSTETRICS & GYNECOLOGY

## 2019-02-01 PROCEDURE — 85027 COMPLETE CBC AUTOMATED: CPT | Performed by: OBSTETRICS & GYNECOLOGY

## 2019-02-01 RX ORDER — HYDROMORPHONE HCL/PF 1 MG/ML
0.2 SYRINGE (ML) INJECTION EVERY 4 HOURS PRN
Status: DISCONTINUED | OUTPATIENT
Start: 2019-02-01 | End: 2019-02-03 | Stop reason: HOSPADM

## 2019-02-01 RX ORDER — KETOROLAC TROMETHAMINE 30 MG/ML
30 INJECTION, SOLUTION INTRAMUSCULAR; INTRAVENOUS EVERY 6 HOURS SCHEDULED
Status: COMPLETED | OUTPATIENT
Start: 2019-02-01 | End: 2019-02-01

## 2019-02-01 RX ORDER — HYDROMORPHONE HYDROCHLORIDE 4 MG/1
4 TABLET ORAL
Status: DISCONTINUED | OUTPATIENT
Start: 2019-02-01 | End: 2019-02-03 | Stop reason: HOSPADM

## 2019-02-01 RX ORDER — DEXTROSE AND SODIUM CHLORIDE 5; .9 G/100ML; G/100ML
100 INJECTION, SOLUTION INTRAVENOUS CONTINUOUS
Status: DISCONTINUED | OUTPATIENT
Start: 2019-02-01 | End: 2019-02-01

## 2019-02-01 RX ADMIN — ONDANSETRON 4 MG: 2 INJECTION INTRAMUSCULAR; INTRAVENOUS at 02:13

## 2019-02-01 RX ADMIN — ACETAMINOPHEN 650 MG: 325 TABLET, FILM COATED ORAL at 18:17

## 2019-02-01 RX ADMIN — KETOROLAC TROMETHAMINE 30 MG: 30 INJECTION, SOLUTION INTRAMUSCULAR at 23:59

## 2019-02-01 RX ADMIN — DOCUSATE SODIUM 100 MG: 100 CAPSULE, LIQUID FILLED ORAL at 08:00

## 2019-02-01 RX ADMIN — IBUPROFEN 600 MG: 600 TABLET ORAL at 05:37

## 2019-02-01 RX ADMIN — DEXTROSE AND SODIUM CHLORIDE 100 ML/HR: 5; .9 INJECTION, SOLUTION INTRAVENOUS at 07:57

## 2019-02-01 RX ADMIN — DEXTROSE, SODIUM CHLORIDE, AND POTASSIUM CHLORIDE 125 ML/HR: 5; .45; .15 INJECTION INTRAVENOUS at 03:30

## 2019-02-01 RX ADMIN — ENOXAPARIN SODIUM 40 MG: 40 INJECTION SUBCUTANEOUS at 08:06

## 2019-02-01 RX ADMIN — KETOROLAC TROMETHAMINE 30 MG: 30 INJECTION, SOLUTION INTRAMUSCULAR at 18:18

## 2019-02-01 RX ADMIN — ACETAMINOPHEN 650 MG: 325 TABLET, FILM COATED ORAL at 13:32

## 2019-02-01 RX ADMIN — KETOROLAC TROMETHAMINE 30 MG: 30 INJECTION, SOLUTION INTRAMUSCULAR at 07:57

## 2019-02-01 RX ADMIN — KETOROLAC TROMETHAMINE 30 MG: 30 INJECTION, SOLUTION INTRAMUSCULAR at 13:32

## 2019-02-01 RX ADMIN — ACETAMINOPHEN 650 MG: 325 TABLET, FILM COATED ORAL at 05:36

## 2019-02-01 RX ADMIN — DOCUSATE SODIUM 100 MG: 100 CAPSULE, LIQUID FILLED ORAL at 18:17

## 2019-02-01 RX ADMIN — ACETAMINOPHEN 650 MG: 325 TABLET, FILM COATED ORAL at 23:58

## 2019-02-01 RX ADMIN — ONDANSETRON 4 MG: 2 INJECTION INTRAMUSCULAR; INTRAVENOUS at 10:37

## 2019-02-01 RX ADMIN — HYDROMORPHONE HYDROCHLORIDE 4 MG: 4 TABLET ORAL at 23:59

## 2019-02-01 RX ADMIN — HYDROMORPHONE HYDROCHLORIDE 4 MG: 4 TABLET ORAL at 15:06

## 2019-02-01 RX ADMIN — HYDROMORPHONE HYDROCHLORIDE 0.2 MG: 1 INJECTION, SOLUTION INTRAMUSCULAR; INTRAVENOUS; SUBCUTANEOUS at 19:57

## 2019-02-01 RX ADMIN — HYDROMORPHONE HYDROCHLORIDE 4 MG: 4 TABLET ORAL at 21:00

## 2019-02-01 RX ADMIN — ONDANSETRON 4 MG: 2 INJECTION INTRAMUSCULAR; INTRAVENOUS at 06:26

## 2019-02-01 NOTE — DISCHARGE SUMMARY
Discharge Summary - Gynecology  Thomas Ansari 25 y o  female MRN: [id number]  Unit/Bed#: E5 -01 Encounter: [id number]    Admission Date: 1/31/2019   Discharge Date: 2/3/2019    Attending Physician:   Dr Jennie Villela, OB/Gyn    Consulting Physician(s):   None    Admitting Diagnosis:   Fibroid uterus  Pelvic pain  Menorrhagia with irregular cycle     Discharge Diagnosis:   Same as above   Status post total abdominal hysterectomy  Status post bilateral salpingectomy    Procedures Performed: Total abdominal hysterectomy, bilateral salpingectomy    HPI:  24 yo G0 with history of pelvic pain and heavy and irregular menstrual cycle with dysmenorrhea presenting for scheduled surgical treatment  She underwent the procedure as described above without major complication  She was then admitted for postoperative observation and pain control  Hospital Course:  1  Fibroid uterus:  - Patient underwent procedure as described above  Official pathology report to be followed up outpatient  2  Postoperative care:  - Patient initially received a dilaudid PCA for pain control  She was transitioned from a Dilaudid PCA pump to scheduled Tylenol, toradol, and Dilaudid  Her yeager catheter was removed on POD 1 and she was able to spontaneously void  She was tolerating clear fluids on POD 1 and then advanced to a regular diet as tolerated  On POD 2 toradol was discontinued and Ibuprofen was started  She passed gas on POD 3 but was unable to have a bowel movement  For DVT prophylaxis, she was given Lovenox daily and encouraged to wear sequential compression devices  3  Seasonal Allergies:  - Patient was continued on her home medication regimen of claritin daily with prn flonase and albuterol inhaler  Patient was discharged on POD 3  On day of discharge she was tolerating regular diet, ambulating and voiding without difficulty  She will follow up with her primary surgeon on 2/7/2019 for a postoperative visit       Lab Results:   Lab Results   Component Value Date    WBC 13 41 (H) 02/01/2019    HGB 9 5 (L) 02/01/2019    HCT 33 3 (L) 02/01/2019    MCV 73 (L) 02/01/2019     (H) 02/01/2019     Lab Results   Component Value Date    GLUCOSE 96 01/31/2019    CALCIUM 8 2 (L) 02/01/2019    K 3 6 02/01/2019    CO2 25 02/01/2019     02/01/2019    BUN 6 02/01/2019    CREATININE 0 55 (L) 02/01/2019     Lab Results   Component Value Date/Time    POCGLU 87 01/31/2019 11:58 AM     No results found for: PTT  No results found for: INR, PROTIME    Complications:   None    Condition at Discharge:   good     Discharge Medications:   Continue all home medications  Dilaudid 4 mg orally every 4 hours as needed for severe pain  Ibuprofen 600 mg orally every 6 hours as needed for moderate pain  Zofran 4 mg ODT every 8 hours as needed for nausea  Colace 100 mg orally twice daily for constipation prevention    Discharge instructions: See after visit summary for complete information  Do not place anything (no partner, tampons or douche) in your vagina for 6 weeks  You may walk for exercise for the first 6 weeks then gradually return to your usual activities     Please do not drive until tolerating pain and off of narcotics     You may take baths or shower per your preference     Please look at your incision in the mirror daily and call for redness or tenderness or increased warmth   Call us for increasing redness or steady drainage from the incision     Please call us for temperature > 100 4*F or 38* C, worsening pain or a foul discharge  Provisions for Follow-Up Care:   See after visit summary for information related to follow-up care and any pertinent home health orders  Disposition: Home  Planned Readmission: No    Code Status: Level 1 - Full Code  Discharge Statement   I spent 30 minutes discharging the patient  This time was spent on the day of discharge  I had direct contact with the patient on the day of discharge   Additional documentation is required if more than 30 minutes were spent on discharge

## 2019-02-01 NOTE — UTILIZATION REVIEW
Initial Clinical Review    Age/Sex: 25 y o  female     Surgery Date:    1/31/2019    Procedure: Preop Diagnosis:  Pelvic pain [R10 2]  Intramural leiomyoma of uterus [D25 1]  Menorrhagia with irregular cycle [N92 1]     Post-Op Diagnosis Codes:     * Pelvic pain [R10 2]     * Intramural leiomyoma of uterus [D25 1]     * Menorrhagia with irregular cycle [N92 1]     Procedure(s) (LRB):  HYSTERECTOMY TOTAL ABDOMINAL (ANTONIO) (N/A)  SALPINGECTOMY (Bilateral)  Operative Findings:  1  External genitalia grossly normal in appearance  No lacerations, no lesions, no ulcerations visualized  Bimanual exam revealed cervix normal without any abnormal contours, enlarged mobile uterus measuring approximately 22-24 weeks in size  Abnormal contours of the uterus palpated due to multiple fibroids  No adnexal masses palpated bilaterally  2  Abdominal exploration exploratory laparotomy revealed multiple large fibroids coming together for large fundal mass extruding from uterus  Uterus normal in size with normal contours with distinct connection of large fibroid mass at fundus  Right fallopian tube enlarged and engorged representing hydrosalpinx with clotted blood within the fallopian tube  Bilateral ovaries grossly normal in appearance    3  Examination of the abdominal cavity at the end of the procedure revealed no signs of injury to abdominal organs including bowel and vasculature          Anesthesia:   choice    Admission Orders: Date/Time/Statement: 1/31/19 @ 1712   Orders Placed This Encounter   Procedures    Inpatient Admission     Standing Status:   Standing     Number of Occurrences:   1     Order Specific Question:   Admitting Physician     Answer:   Linda Kelly     Order Specific Question:   Level of Care     Answer:   Med Surg [16]     Order Specific Question:   Estimated length of stay     Answer:   More than 2 Midnights     Order Specific Question:   Certification     Answer:   I certify that inpatient services are medically necessary for this patient for a duration of greater than two midnights  See H&P and MD Progress Notes for additional information about the patient's course of treatment  Vital Signs: /71 (BP Location: Right arm)   Pulse 86   Temp 98 2 °F (36 8 °C) (Tympanic)   Resp 18   Ht 5' 6" (1 676 m)   Wt 108 kg (237 lb)   LMP 2019   SpO2 99%   Breastfeeding? No   BMI 38 25 kg/m²   Diet:        Diet Orders            Start     Ordered    19  Diet Clear Liquid  Diet effective now     Question Answer Comment   Diet Type Clear Liquid    RD to adjust diet per protocol? Yes        19        Mobility:   As  laura    DVT Prophylaxis:   SCD'S    Pain Control:   Pain Medications             acetaminophen (TYLENOL) 500 mg tablet Take 500-1,000 mg by mouth every 6 (six) hours as needed for mild pain    oxyCODONE-acetaminophen (PERCOCET) 5-325 mg per tablet () Take 1-2 tablets by mouth every 6 (six) hours as needed for moderate pain or severe pain for up to 10 days Max Daily Amount: 8 tablets        Dilaudid  PCA  Tylenol  Q 6 hrs  SQ   lovenox  Daily  Motrin Q  6 hrs  IV  toradol Q  6 hrs  IV  zofran PRN   ( x 2  2/ thus far)    POST OP PROGRESS  NOTE     @  11:48  PM    28yo G0 with large fibroid uterus s/p ANTONIO and BS, stable  1)S/p surgery  Continue routine post-op care  Wolfe in place, making adequate UOP  Diet: clear fluids, D5 1/2NS w/20 KCL  DVT PPx: SCDs, Lovenox in AM pending CBC  Encouraged incentive spirometry to reduce atelectasis and pneumonia risk  Encouraged ambulation as tolerated tomorrow  Analgesia: S/p TAPs block  Motrin & Tylenol ATC  Dilaudid 0 5mg PCA (given 15, attempts 28)     2)Allergies: Claritin qD, Flonase PRN, Albuterol PRN     3) Anemia: Hgb on 19 9 5  Pt received 4 doses of IV Venofer   EBL during surgery 450mL-->F/u post-op Hgb     POST OP PROGRESS  NOTE  31  64-year-old G0 with enlarged fibroid uterus and right hydrosalpinx status post total abdominal hysterectomy and bilateral salpingectomy  Postoperative day 1  Currently in stable condition      Plan:  1  Fibroid uterus:  -status post surgical resection  Follow-up final pathology  2  Postoperative care:  -pain moderately controlled with PCA and scheduled Tylenol and Motrin  Plan to continue PCA today consider increase of dosage from 1 milligram/hour to 2 milligrams/hour p r n   -tolerating clear fluids  Patient still having nausea without vomiting  Continue Zofran for antiemetic   -shallow breathing secondary to pain in her abdomen  Encourage out of bed to chair and incentive spirometry today  -Sen strap posteriorly digging into pannus area of incision  Consider placement of abdominal binder  Network Utilization Review Department  Phone: [phone]; Fax [phone]  [email or web address]  ATTENTION: Please call with any questions or concerns to [phone]  and carefully listen to the prompts so that you are directed to the right person  Send all requests for admission clinical reviews, approved or denied determinations and any other requests to fax [phone]   All voicemails are confidential

## 2019-02-01 NOTE — PROGRESS NOTES
Post Op Check  Navin Dalal 25 y o  female MRN: [id number]  Unit/Bed#: E5 -01 Encounter: [id number]    Subjective:  Patient is sleeping upon entry into room, however easily arousable  Patient reports some pain in her back, however mostly controlled  She has not been out of bed  She still reports feeling out of it from anesthesia  She reports some nausea, but denies vomiting  She denies chest pain, shortness of breath, leg pain  /96   Pulse 88   Temp 98 °F (36 7 °C)   Resp 18   Ht 5' 6" (1 676 m)   Wt 108 kg (237 lb)   LMP 01/26/2019   SpO2 95%   Breastfeeding? No   BMI 38 25 kg/m²     I/O this shift:  In: 201 5 [I V :201 5]  Out: 1200 [Urine:1200]    Lab Results   Component Value Date    WBC 6 00 01/17/2019    HGB 10 9 (L) 01/31/2019    HCT 32 (L) 01/31/2019    MCV 64 (L) 01/17/2019     (H) 01/17/2019       Lab Results   Component Value Date    GLUCOSE 96 01/31/2019    CALCIUM 8 9 01/12/2019    K 4 0 01/12/2019    CO2 26 01/31/2019     01/12/2019    BUN 10 01/12/2019    CREATININE 0 53 (L) 01/12/2019       Physical Exam   Constitutional: She is oriented to person, place, and time  She appears well-developed and well-nourished  No distress  HENT:   Head: Normocephalic and atraumatic  Cardiovascular: Normal rate, regular rhythm and normal heart sounds  Exam reveals no gallop and no friction rub  No murmur heard  Pulmonary/Chest: Effort normal and breath sounds normal  No respiratory distress  She has no wheezes  She has no rales  On 2L NC   Abdominal: Soft  There is tenderness (appropriately)  There is no rebound and no guarding  Midline, vertical incision from umbilicus to pubic symphysis is C/D/I without erythema, drainage, fluctuance  With Sen straps in place   Genitourinary:   Genitourinary Comments: Wolfe catheter in place with 350cc of clear yellow urine in bag   Musculoskeletal: Normal range of motion   She exhibits no edema, tenderness or deformity  SCDs on and on   Neurological: She is alert and oriented to person, place, and time  Skin: She is not diaphoretic  Psychiatric: She has a normal mood and affect  Her behavior is normal  Judgment and thought content normal    Vitals reviewed  A/P:29yo G0 with large fibroid uterus s/p ANTONIO and BS, stable  1)S/p surgery  Continue routine post-op care  Wolfe in place, making adequate UOP  Diet: clear fluids, D5 1/2NS w/20 KCL  DVT PPx: SCDs, Lovenox in AM pending CBC  Encouraged incentive spirometry to reduce atelectasis and pneumonia risk  Encouraged ambulation as tolerated tomorrow  Analgesia: S/p TAPs block  Motrin & Tylenol ATC  Dilaudid 0 5mg PCA (given 15, attempts 28)    2)Allergies: Claritin qD, Flonase PRN, Albuterol PRN    3) Anemia: Hgb on 1/17/19 9 5  Pt received 4 doses of IV Venofer   EBL during surgery 450mL-->F/u post-op Hgb       Rashid Burrell MD  OBGYN, PGY-2  2/1/2019 12:00 AM

## 2019-02-01 NOTE — PROGRESS NOTES
Gyn Progress Note   eTssa Ceballos 25 y o  female MRN: [id number]  Unit/Bed#: E5 -01 Encounter: [id number]    Assessment:  26-year-old G0 with enlarged fibroid uterus and right hydrosalpinx status post total abdominal hysterectomy and bilateral salpingectomy  Postoperative day 1  Currently in stable condition  Plan:  1  Fibroid uterus:  -status post surgical resection  Follow-up final pathology  2  Postoperative care:  -pain moderately controlled with PCA and scheduled Tylenol and Motrin  Plan to continue PCA today consider increase of dosage from 1 milligram/hour to 2 milligrams/hour p r n   -tolerating clear fluids  Patient still having nausea without vomiting  Continue Zofran for antiemetic   -shallow breathing secondary to pain in her abdomen  Encourage out of bed to chair and incentive spirometry today  -Sen strap posteriorly digging into pannus area of incision  Consider placement of abdominal binder  3  Allergies:  -patient continue on home regimen Claritin daily  Also provided with Flonase and albuterol inhaler as needed  FEN: clear liquid, D5 1/2NS w/20KCl  DVT ppx: SCDs, lovenox 40mg qd    Dispo:  Continue inpatient care for postoperative pain management    Tessa Ceballos had no acute events overnight  She reports that her pain is so so and only mildly controlled  She is tolerating clear fluids with still reports that she is having nausea  She does report difficulty with taking deep breath secondary to pain in her abdomen from her incision  She denies any chest pain or leg pain  She does report when taking a deep breath that she gets "short of breath" from the pain  Review of her PCA pump revealed 116 at [facility] for medication was only 56 administered doses for a total of 5 6 mg of Dilaudid administered overnight  /88   Pulse 88   Temp 98 °F (36 7 °C)   Resp 18   Ht 5' 6" (1 676 m)   Wt 108 kg (237 lb)   LMP 01/26/2019   SpO2 98%   Breastfeeding? No   BMI 38 25 kg/m²     Lab Results   Component Value Date    WBC 13 41 (H) 02/01/2019    HGB 9 5 (L) 02/01/2019    HCT 33 3 (L) 02/01/2019    MCV 73 (L) 02/01/2019     (H) 02/01/2019       Lab Results   Component Value Date    GLUCOSE 96 01/31/2019    CALCIUM 7 3 (L) 02/01/2019    K 5 5 (H) 02/01/2019    CO2 23 02/01/2019     02/01/2019    BUN 6 02/01/2019    CREATININE 0 63 02/01/2019       I/O last 3 completed shifts: In: 3800 [I V :3800]  Out: 1550 [Urine:1100; Blood:450]  I/O this shift:  In: 1201 5 [I V :1201 5]  Out: 3350 [Urine:3350]      Physical Exam  Gen:  Not in acute distress, lying supine in bed  CVS:  Regular rate  Nontender  Lungs:  Clear auscultation bilaterally, shallow breathing without air movement in her lung bases bilaterally  Abdomen:  Soft appropriately tender on incision  Sen straps in place around incision  Incision clean dry and intact  Posterior Sen strap appears to be folded into pannus with some bloody drainage  Abdomen nondistended  Extremities:  SCDs in place, no edema    Lakesha Farrell MD  [facility], [address] [facility]  2/1/2019 6:41 AM

## 2019-02-02 RX ORDER — IBUPROFEN 600 MG/1
600 TABLET ORAL EVERY 6 HOURS SCHEDULED
Status: DISCONTINUED | OUTPATIENT
Start: 2019-02-02 | End: 2019-02-03 | Stop reason: HOSPADM

## 2019-02-02 RX ADMIN — DOCUSATE SODIUM 100 MG: 100 CAPSULE, LIQUID FILLED ORAL at 08:25

## 2019-02-02 RX ADMIN — IBUPROFEN 600 MG: 600 TABLET ORAL at 18:21

## 2019-02-02 RX ADMIN — HYDROMORPHONE HYDROCHLORIDE 4 MG: 4 TABLET ORAL at 05:22

## 2019-02-02 RX ADMIN — HYDROMORPHONE HYDROCHLORIDE 4 MG: 4 TABLET ORAL at 20:01

## 2019-02-02 RX ADMIN — IBUPROFEN 600 MG: 600 TABLET ORAL at 23:38

## 2019-02-02 RX ADMIN — ACETAMINOPHEN 650 MG: 325 TABLET, FILM COATED ORAL at 23:38

## 2019-02-02 RX ADMIN — ACETAMINOPHEN 650 MG: 325 TABLET, FILM COATED ORAL at 12:06

## 2019-02-02 RX ADMIN — HYDROMORPHONE HYDROCHLORIDE 4 MG: 4 TABLET ORAL at 23:38

## 2019-02-02 RX ADMIN — HYDROMORPHONE HYDROCHLORIDE 4 MG: 4 TABLET ORAL at 12:06

## 2019-02-02 RX ADMIN — HYDROMORPHONE HYDROCHLORIDE 4 MG: 4 TABLET ORAL at 08:25

## 2019-02-02 RX ADMIN — IBUPROFEN 600 MG: 600 TABLET ORAL at 12:06

## 2019-02-02 RX ADMIN — ENOXAPARIN SODIUM 40 MG: 40 INJECTION SUBCUTANEOUS at 08:34

## 2019-02-02 RX ADMIN — HYDROMORPHONE HYDROCHLORIDE 4 MG: 4 TABLET ORAL at 16:09

## 2019-02-02 RX ADMIN — ACETAMINOPHEN 650 MG: 325 TABLET, FILM COATED ORAL at 18:21

## 2019-02-02 RX ADMIN — ACETAMINOPHEN 650 MG: 325 TABLET, FILM COATED ORAL at 05:21

## 2019-02-02 RX ADMIN — DOCUSATE SODIUM 100 MG: 100 CAPSULE, LIQUID FILLED ORAL at 18:21

## 2019-02-02 NOTE — PROGRESS NOTES
Chad Khan  [id number]  2/2/2019  8:01 AM    PROGRESS NOTE POST-OP    S:  Chad Khan is doing well  Pain is overall controlled  Denies nausea/vomiting  Denies chest pain, shortness of breath  No complaints at this time  She is curious about getting a walker for discharge tomorrow and has concerns about going up the stairs of her home  Her bedroom is on the 3rd floor and the 2nd floor is the bathroom      O:  Vitals:    02/01/19 2352   BP: 138/87   Pulse: 72   Resp: 18   Temp: 98 °F (36 7 °C)   SpO2: 96%     Exam:  Gen: in NAD, cooperative  CV: RRR  Resp: CTAB  Abd: soft, appropriately tender to palpation, midline incision from pubic bone to umbilicus is c/d/i; Sen straps in place x2  Ext:  SCDs b/l    A:  Chad Khan is a 24 yo P0 s/p ANTONIO & bilateral salpingectomy for an enlarged, fibroid uterus and right hydrosalpinx, POD #2 and currently stable     P:  1) Fibroid uterus   - S/p hysterectomy, f/u final pathology  2) Post-operative status   - Pain control:     > Tylenol 650mg q6h schd    > Dilaudid 4mg q4h schd (consider PRN, previously on PCA pump)    > Toradol 30mg IV q6h schd   - Continue zofran, incentive spirometry   - Pt tolerating PO, had salmon last night w/o difficulty   - No gas or stool, voiding independently   - DVT ppx, SCDs ambulation, Lovenox 40mg qd   - Encouraged ambulation, frequent hydration, OOB to chair with meals/throughout day  3) Dispo:   - Plan for discharge later today vs  Tomorrow pending patient's pain/recovery status    Allen An,   PGY-2 OB/GYN   2/2/2019 8:01 AM

## 2019-02-03 VITALS
DIASTOLIC BLOOD PRESSURE: 81 MMHG | WEIGHT: 237 LBS | RESPIRATION RATE: 18 BRPM | SYSTOLIC BLOOD PRESSURE: 131 MMHG | HEART RATE: 79 BPM | HEIGHT: 66 IN | OXYGEN SATURATION: 99 % | TEMPERATURE: 96.8 F | BODY MASS INDEX: 38.09 KG/M2

## 2019-02-03 RX ORDER — IBUPROFEN 600 MG/1
600 TABLET ORAL EVERY 6 HOURS PRN
Qty: 30 TABLET | Refills: 1 | Status: SHIPPED | OUTPATIENT
Start: 2019-02-03 | End: 2019-02-07 | Stop reason: SDUPTHER

## 2019-02-03 RX ORDER — HYDROMORPHONE HYDROCHLORIDE 4 MG/1
4 TABLET ORAL EVERY 6 HOURS PRN
Qty: 30 TABLET | Refills: 0 | Status: SHIPPED | OUTPATIENT
Start: 2019-02-03 | End: 2019-02-07

## 2019-02-03 RX ORDER — DOCUSATE SODIUM 100 MG/1
100 CAPSULE, LIQUID FILLED ORAL 2 TIMES DAILY
Qty: 20 CAPSULE | Refills: 0 | Status: SHIPPED | OUTPATIENT
Start: 2019-02-03 | End: 2019-03-14

## 2019-02-03 RX ORDER — ONDANSETRON 4 MG/1
4 TABLET, ORALLY DISINTEGRATING ORAL EVERY 8 HOURS PRN
Qty: 30 TABLET | Refills: 0 | Status: SHIPPED | OUTPATIENT
Start: 2019-02-03 | End: 2019-03-14

## 2019-02-03 RX ORDER — POLYETHYLENE GLYCOL 3350 17 G/17G
17 POWDER, FOR SOLUTION ORAL ONCE
Status: COMPLETED | OUTPATIENT
Start: 2019-02-03 | End: 2019-02-03

## 2019-02-03 RX ADMIN — ONDANSETRON 4 MG: 2 INJECTION INTRAMUSCULAR; INTRAVENOUS at 04:26

## 2019-02-03 RX ADMIN — ACETAMINOPHEN 650 MG: 325 TABLET, FILM COATED ORAL at 05:09

## 2019-02-03 RX ADMIN — LORATADINE 10 MG: 10 TABLET ORAL at 09:22

## 2019-02-03 RX ADMIN — DOCUSATE SODIUM 100 MG: 100 CAPSULE, LIQUID FILLED ORAL at 09:22

## 2019-02-03 RX ADMIN — POLYETHYLENE GLYCOL 3350 17 G: 17 POWDER, FOR SOLUTION ORAL at 10:59

## 2019-02-03 RX ADMIN — HYDROMORPHONE HYDROCHLORIDE 4 MG: 4 TABLET ORAL at 08:40

## 2019-02-03 RX ADMIN — HYDROMORPHONE HYDROCHLORIDE 4 MG: 4 TABLET ORAL at 05:09

## 2019-02-03 RX ADMIN — IBUPROFEN 600 MG: 600 TABLET ORAL at 05:09

## 2019-02-03 NOTE — PROGRESS NOTES
Progress Note - OB/GYN   Rupal Bedolla 25 y o  female MRN: [id number]  Unit/Bed#: E5 -01 Encounter: [id number]    Assessment:    Patient is a 78-year-old P0 s/p ANTONIO and bilateral salpingectomy for an enlarged, fibroid uterus and right hydrosalpinx, POD# 3, stable    Plan:  1) Fibroid uterus   Status post hysterectomy, follow-up final pathology    2) Postoperative status   Tylenol 650mg q6hr and Toradol 30mg q6hr scheduled   Dilaudid 4 mg q4hr scheduled   Continue Zofran, incentive spirometry   Patient tolerating regular diet   Passing gas, no bowel movements   Continue bilateral SCDs and Lovenox for DVT prophylaxis   Encouraged ambulation, out of bed to chair    3) Disposition: anticipate discharge today    Subjective/Objective   Chief Complaint:     Patient is doing well  She was sleeping upon initial evaluation this morning  She has been able to tolerate a regular diet  She had some nausea overnight, which was treated with Zofran  Her pain is currently minimal on scheduled analgesia  She reports passing gas but denies bowel movements  She denies shortness of breath, chest pain, dizziness, lower extremity tenderness  Objective:     Vitals: /84 (BP Location: Left arm)   Pulse 75   Temp 97 5 °F (36 4 °C) (Temporal)   Resp 18   Ht 5' 6" (1 676 m)   Wt 108 kg (237 lb)   LMP 01/26/2019   SpO2 93%   Breastfeeding?  No   BMI 38 25 kg/m²     No intake or output data in the 24 hours ending 02/03/19 0654    Physical Exam:     Gen: AAOx3, NAD  CV: CV, RRR  Resp: CTA b/l, no WRR  Abd: soft, mildly tender to palpation, midline incision clean, dry, intact  Sen straps in place  Abdominal binder on      Lab Results   Component Value Date    WBC 13 41 (H) 02/01/2019    HGB 9 5 (L) 02/01/2019    HCT 33 3 (L) 02/01/2019    MCV 73 (L) 02/01/2019     (H) 02/01/2019         Dave Hernandez MD  2/3/2019  6:54 AM

## 2019-02-04 ENCOUNTER — TELEPHONE (OUTPATIENT)
Dept: OBGYN CLINIC | Facility: CLINIC | Age: 29
End: 2019-02-04

## 2019-02-04 NOTE — TELEPHONE ENCOUNTER
Pt called wanting to know when she will be able to travel or will it be ok for her to travel due to a family emergency  Pt can be reached at [phone]    2/5/19 Pt called at noon today stating she has not been feeling well since yesterday  States she is feeling light headed and dizzy, has not taken her pain medication since last night  Pt says she is just feeling off and would like to talk to Dr Alanna Sterling about this, says she was instructed to call office at any time if she had any questions or problems   [phone]

## 2019-02-05 NOTE — TELEPHONE ENCOUNTER
I called the patient back  She reports she was dizzy this morning and short of breath and a piercing headache  She reports that she went back to bed and her headache resolved  She reports her dizziness is intermittent and is not a problem  She reports she wants to know if she can use her asthma pump  PT advised she may  Pt advised if her SOB does not improve with her asthma pump to go to the ED  Pt reports she had a BM today and that helped alleviate her cramping pain  Pt agreeable with plan of care

## 2019-02-07 ENCOUNTER — OFFICE VISIT (OUTPATIENT)
Dept: OBGYN CLINIC | Facility: CLINIC | Age: 29
End: 2019-02-07

## 2019-02-07 VITALS
HEIGHT: 66 IN | SYSTOLIC BLOOD PRESSURE: 134 MMHG | DIASTOLIC BLOOD PRESSURE: 78 MMHG | BODY MASS INDEX: 36.16 KG/M2 | WEIGHT: 225 LBS

## 2019-02-07 DIAGNOSIS — Z90.79 STATUS POST BILATERAL SALPINGECTOMY: Primary | ICD-10-CM

## 2019-02-07 DIAGNOSIS — Z90.710 S/P TAH (TOTAL ABDOMINAL HYSTERECTOMY): ICD-10-CM

## 2019-02-07 PROBLEM — D25.1 INTRAMURAL LEIOMYOMA OF UTERUS: Status: RESOLVED | Noted: 2019-01-16 | Resolved: 2019-02-07

## 2019-02-07 PROBLEM — R10.2 PELVIC PAIN: Status: RESOLVED | Noted: 2019-01-13 | Resolved: 2019-02-07

## 2019-02-07 PROBLEM — N92.1 MENORRHAGIA WITH IRREGULAR CYCLE: Status: RESOLVED | Noted: 2019-01-16 | Resolved: 2019-02-07

## 2019-02-07 PROCEDURE — 99024 POSTOP FOLLOW-UP VISIT: CPT | Performed by: OBSTETRICS & GYNECOLOGY

## 2019-02-07 RX ORDER — IBUPROFEN 600 MG/1
600 TABLET ORAL EVERY 6 HOURS PRN
Qty: 30 TABLET | Refills: 1 | Status: SHIPPED | OUTPATIENT
Start: 2019-02-07 | End: 2019-02-15

## 2019-02-07 RX ORDER — OXYCODONE HYDROCHLORIDE AND ACETAMINOPHEN 5; 325 MG/1; MG/1
1-2 TABLET ORAL EVERY 6 HOURS PRN
Qty: 15 TABLET | Refills: 0 | Status: SHIPPED | OUTPATIENT
Start: 2019-02-07 | End: 2019-02-25

## 2019-02-07 NOTE — PROGRESS NOTES
Patient is a 25 y o  Desire Brine with Patient's last menstrual period was 2019  who presents for postoperative appointment s/p ANTONIO/BS on 2019  Pt reports overall she is feeling better  She has pain that she uses ibuprofen for during the day and dilaudid at night  However pt reports that she is getting a severe headache with the dilaudid and stopped using it, but her pain is intolerable as a result--so she took 1200 mg ibuprofen  Pt advised against this  Rx given for percocet as pt has tolerated this in the past  Pt has dilaudid with her and presented it to staff for disposal    Pt reports she is voiding, has had a BM and performing ADLs  She reports her incision is clean and dry  She denies erythema or purulent drainage  She reports that her partner removed her Sen straps 2 days ago because they were peeling and getting dirty  Reviewed with patient that her pathology returned and it was benign  Past Medical History:   Diagnosis Date    Anemia due to chronic blood loss     has had recent iron infusions    Anxiety     Asthma     Bipolar disorder (HCC)     Depression     Dry cough     pt states allergy related    Environmental and seasonal allergies     Food allergy     raisins    Wears contact lenses        Past Surgical History:   Procedure Laterality Date    NO PAST SURGERIES      WA REMOVAL OF FALLOPIAN TUBE Bilateral 2019    Procedure: SALPINGECTOMY;  Surgeon: Meri Reyes MD;  Location: [facility];  Service: Gynecology    WA TOTAL ABDOM HYSTERECTOMY N/A 2019    Procedure: HYSTERECTOMY TOTAL ABDOMINAL (ANTONIO);   Surgeon: Meri Reyes MD;  Location: [facility];  Service: Gynecology       OB History    Para Term  AB Living   0 0 0 0 0 0   SAB TAB Ectopic Multiple Live Births   0 0 0 0 0         Obstetric Comments   Menarche: 8      Menses: 30-60/3-4/super plus tampon every 2 hours on day 1 and then every 4 hours           Current Outpatient Prescriptions:    albuterol (PROVENTIL HFA,VENTOLIN HFA) 90 mcg/act inhaler, Inhale 2 puffs every 4 (four) hours as needed for wheezing, Disp: 1 Inhaler, Rfl: 0    benzonatate (TESSALON PERLES) 100 mg capsule, Take 1 capsule (100 mg total) by mouth every 8 (eight) hours (Patient taking differently: Take 100 mg by mouth 3 (three) times a day as needed  ), Disp: 21 capsule, Rfl: 0    docusate sodium (COLACE) 100 mg capsule, Take 1 capsule (100 mg total) by mouth 2 (two) times a day for 10 days, Disp: 20 capsule, Rfl: 0    fluticasone (FLONASE) 50 mcg/act nasal spray, 1 spray into each nostril daily (Patient taking differently: 1 spray into each nostril daily as needed  ), Disp: 16 g, Rfl: 0    guaiFENesin (ROBITUSSIN) 100 MG/5ML oral liquid, Take 200 mg by mouth 3 (three) times a day as needed for cough, Disp: , Rfl:     ibuprofen (MOTRIN) 600 mg tablet, Take 1 tablet (600 mg total) by mouth every 6 (six) hours as needed for mild pain, Disp: 30 tablet, Rfl: 1    loratadine (CLARITIN) 10 mg tablet, Take 1 tablet (10 mg total) by mouth daily, Disp: 20 tablet, Rfl: 0    ondansetron (ZOFRAN-ODT) 4 mg disintegrating tablet, Take 1 tablet (4 mg total) by mouth every 8 (eight) hours as needed for nausea or vomiting, Disp: 30 tablet, Rfl: 0    oxyCODONE-acetaminophen (PERCOCET) 5-325 mg per tablet, Take 1-2 tablets by mouth every 6 (six) hours as needed for severe pain Max Daily Amount: 8 tablets, Disp: 15 tablet, Rfl: 0    Allergies   Allergen Reactions    Amoxicillin Tongue Swelling and Facial Swelling    Penicillins      Unsure of reaction        Social History     Social History    Marital status: Significant Other     Spouse name: Lachelle Mel Number of children: 0    Years of education: HS     Occupational History    unemployed      Social History Main Topics    Smoking status: Never Smoker    Smokeless tobacco: Never Used    Alcohol use Yes      Comment: few x year    Drug use: Yes     Frequency: 3 0 times per week Types: Marijuana      Comment: 6 blunts/week; h o trying cocaine last used on Wednesday     Sexual activity: Yes     Partners: Female     Birth control/ protection: None      Comment: lifetime sexual partners: >50; current partner: 4 years; Pt reports she has had sex with men, but has been only have sex with women since age 10     Other Topics Concern    None     Social History Narrative    Oriental orthodox: no preference    Accepts blood products        Exercise: none    Calcium: occ cheese       Family History   Problem Relation Age of Onset    Colon cancer Father          age 40    Prostate cancer Father     Cirrhosis Father     Alcohol abuse Father     Sleep apnea Father     Cancer Maternal Grandmother     Other Mother         hysterectomy    No Known Problems Sister     No Known Problems Brother     Other Paternal Grandmother          during surgery    No Known Problems Brother     Breast cancer Neg Hx     Ovarian cancer Neg Hx        Review of Systems   Constitutional: Negative for chills, fatigue, fever and unexpected weight change  HENT: Negative for congestion, mouth sores and sore throat  Respiratory: Negative for cough, chest tightness, shortness of breath and wheezing  Cardiovascular: Negative for chest pain and palpitations  Gastrointestinal: Positive for abdominal pain  Negative for abdominal distention, constipation, diarrhea, nausea and vomiting  Endocrine: Negative for cold intolerance and heat intolerance  Genitourinary: Negative for dyspareunia, dysuria, genital sores, menstrual problem, pelvic pain, vaginal bleeding, vaginal discharge and vaginal pain  Musculoskeletal: Negative for arthralgias  Skin: Negative for color change and rash  Neurological: Negative for dizziness, light-headedness and headaches  Hematological: Negative for adenopathy       Incision C/D/I    Blood pressure 134/78, height 5' 6" (1 676 m), weight 102 kg (225 lb), last menstrual period 01/26/2019, not currently breastfeeding  and Body mass index is 36 32 kg/m²  Physical Exam   Constitutional: She is oriented to person, place, and time  She appears well-developed and well-nourished  HENT:   Head: Atraumatic  Eyes: Conjunctivae and EOM are normal    Neck: Normal range of motion  No tracheal deviation present  No thyromegaly present  Cardiovascular: Normal rate, regular rhythm and normal heart sounds  Pulmonary/Chest: Effort normal and breath sounds normal  No stridor  No respiratory distress  She has no wheezes  She has no rales  Abdominal: Soft  Bowel sounds are normal  She exhibits no distension and no mass  There is no tenderness  There is no rebound and no guarding  Musculoskeletal: Normal range of motion  She exhibits no edema or tenderness  Lymphadenopathy:     She has no cervical adenopathy  Neurological: She is alert and oriented to person, place, and time  Skin: Skin is warm  No rash noted  No erythema  Psychiatric: She has a normal mood and affect  Her behavior is normal  Judgment and thought content normal      Incision C/D/I      A/P:  Pt is a 25 y o  Emir Love with      Diagnoses and all orders for this visit:    Status post bilateral salpingectomy    S/P ANTONIO (total abdominal hysterectomy)  -     ibuprofen (MOTRIN) 600 mg tablet; Take 1 tablet (600 mg total) by mouth every 6 (six) hours as needed for mild pain  -     oxyCODONE-acetaminophen (PERCOCET) 5-325 mg per tablet; Take 1-2 tablets by mouth every 6 (six) hours as needed for severe pain Max Daily Amount: 8 tablets          Doing well  Will return in 5 weeks for vaginal examination

## 2019-02-10 DIAGNOSIS — Z90.710 S/P TAH (TOTAL ABDOMINAL HYSTERECTOMY): ICD-10-CM

## 2019-02-11 RX ORDER — OXYCODONE HYDROCHLORIDE AND ACETAMINOPHEN 5; 325 MG/1; MG/1
1-2 TABLET ORAL EVERY 6 HOURS PRN
Qty: 15 TABLET | Refills: 0 | OUTPATIENT
Start: 2019-02-11

## 2019-02-14 ENCOUNTER — TELEPHONE (OUTPATIENT)
Dept: OBGYN CLINIC | Facility: CLINIC | Age: 29
End: 2019-02-14

## 2019-02-14 NOTE — TELEPHONE ENCOUNTER
Pt called stating she was having drainage from incision area  I told pt she should come in and have it checked  Pt did not show for appointment  I called patient to reschedule this appointment and she scheduled for later on the same day and no showed the second time  I called her again to make sure everything was okay, her significant other answered and said she needs to be seen it looks a bit infected but Janene cannot find a ride  Per SB if she cannot find a ride and she is close to hospital to go to the hospital to have it checked and follow up with us after  Pt aware

## 2019-02-15 ENCOUNTER — APPOINTMENT (EMERGENCY)
Dept: CT IMAGING | Facility: HOSPITAL | Age: 29
End: 2019-02-15
Payer: COMMERCIAL

## 2019-02-15 ENCOUNTER — HOSPITAL ENCOUNTER (EMERGENCY)
Facility: HOSPITAL | Age: 29
Discharge: HOME | End: 2019-02-15
Attending: EMERGENCY MEDICINE | Admitting: EMERGENCY MEDICINE
Payer: COMMERCIAL

## 2019-02-15 VITALS
SYSTOLIC BLOOD PRESSURE: 143 MMHG | HEART RATE: 78 BPM | TEMPERATURE: 97.3 F | BODY MASS INDEX: 37.58 KG/M2 | OXYGEN SATURATION: 99 % | RESPIRATION RATE: 18 BRPM | WEIGHT: 232.81 LBS | DIASTOLIC BLOOD PRESSURE: 87 MMHG

## 2019-02-15 DIAGNOSIS — N83.209 OVARIAN CYST: ICD-10-CM

## 2019-02-15 DIAGNOSIS — L03.90 CELLULITIS: Primary | ICD-10-CM

## 2019-02-15 LAB
ALBUMIN SERPL BCP-MCNC: 4.3 G/DL (ref 3–5.2)
ALP SERPL-CCNC: 46 U/L (ref 43–122)
ALT SERPL W P-5'-P-CCNC: 15 U/L (ref 9–52)
ANION GAP SERPL CALCULATED.3IONS-SCNC: 7 MMOL/L (ref 5–14)
ANISOCYTOSIS BLD QL SMEAR: PRESENT
AST SERPL W P-5'-P-CCNC: 49 U/L (ref 14–36)
BASOPHILS # BLD AUTO: 0.1 THOUSANDS/ΜL (ref 0–0.1)
BASOPHILS NFR BLD AUTO: 1 % (ref 0–1)
BILIRUB SERPL-MCNC: 1 MG/DL
BILIRUB UR QL STRIP: NEGATIVE
BUN SERPL-MCNC: 10 MG/DL (ref 5–25)
CALCIUM SERPL-MCNC: 8.6 MG/DL (ref 8.4–10.2)
CHLORIDE SERPL-SCNC: 106 MMOL/L (ref 97–108)
CLARITY UR: CLEAR
CO2 SERPL-SCNC: 24 MMOL/L (ref 22–30)
COLOR UR: NORMAL
CREAT SERPL-MCNC: 0.55 MG/DL (ref 0.6–1.2)
EOSINOPHIL # BLD AUTO: 0.4 THOUSAND/ΜL (ref 0–0.4)
EOSINOPHIL NFR BLD AUTO: 5 % (ref 0–6)
ERYTHROCYTE [DISTWIDTH] IN BLOOD BY AUTOMATED COUNT: 28.1 %
GFR SERPL CREATININE-BSD FRML MDRD: 145 ML/MIN/1.73SQ M
GLUCOSE SERPL-MCNC: 79 MG/DL (ref 70–99)
GLUCOSE UR STRIP-MCNC: NEGATIVE MG/DL
HCT VFR BLD AUTO: 36.6 % (ref 36–46)
HGB BLD-MCNC: 10.9 G/DL (ref 12–16)
HGB UR QL STRIP.AUTO: NEGATIVE
HYPERCHROMIA BLD QL SMEAR: PRESENT
KETONES UR STRIP-MCNC: NEGATIVE MG/DL
LEUKOCYTE ESTERASE UR QL STRIP: NEGATIVE
LYMPHOCYTES # BLD AUTO: 2.4 THOUSANDS/ΜL (ref 0.5–4)
LYMPHOCYTES NFR BLD AUTO: 29 % (ref 25–45)
MCH RBC QN AUTO: 21.6 PG (ref 26–34)
MCHC RBC AUTO-ENTMCNC: 29.9 G/DL (ref 31–36)
MCV RBC AUTO: 72 FL (ref 80–100)
MONOCYTES # BLD AUTO: 0.7 THOUSAND/ΜL (ref 0.2–0.9)
MONOCYTES NFR BLD AUTO: 9 % (ref 1–10)
NEUTROPHILS # BLD AUTO: 4.6 THOUSANDS/ΜL (ref 1.8–7.8)
NEUTS SEG NFR BLD AUTO: 56 % (ref 45–65)
NITRITE UR QL STRIP: NEGATIVE
PH UR STRIP.AUTO: 8 [PH] (ref 4.5–8)
PLATELET # BLD AUTO: 504 THOUSANDS/UL (ref 150–450)
PLATELET BLD QL SMEAR: ABNORMAL
PMV BLD AUTO: 7.5 FL (ref 8.9–12.7)
POIKILOCYTOSIS BLD QL SMEAR: PRESENT
POTASSIUM SERPL-SCNC: 5.7 MMOL/L (ref 3.6–5)
PROT SERPL-MCNC: 7.8 G/DL (ref 5.9–8.4)
PROT UR STRIP-MCNC: NEGATIVE MG/DL
RBC # BLD AUTO: 5.06 MILLION/UL (ref 4–5.2)
RBC MORPH BLD: ABNORMAL
SODIUM SERPL-SCNC: 137 MMOL/L (ref 137–147)
SP GR UR STRIP.AUTO: 1.01 (ref 1–1.04)
UROBILINOGEN UA: NEGATIVE MG/DL
WBC # BLD AUTO: 8.2 THOUSAND/UL (ref 4.5–11)

## 2019-02-15 PROCEDURE — 96375 TX/PRO/DX INJ NEW DRUG ADDON: CPT

## 2019-02-15 PROCEDURE — 74177 CT ABD & PELVIS W/CONTRAST: CPT

## 2019-02-15 PROCEDURE — 85025 COMPLETE CBC W/AUTO DIFF WBC: CPT | Performed by: EMERGENCY MEDICINE

## 2019-02-15 PROCEDURE — 80053 COMPREHEN METABOLIC PANEL: CPT | Performed by: EMERGENCY MEDICINE

## 2019-02-15 PROCEDURE — 96360 HYDRATION IV INFUSION INIT: CPT

## 2019-02-15 PROCEDURE — 36415 COLL VENOUS BLD VENIPUNCTURE: CPT | Performed by: EMERGENCY MEDICINE

## 2019-02-15 PROCEDURE — 96365 THER/PROPH/DIAG IV INF INIT: CPT

## 2019-02-15 PROCEDURE — 99284 EMERGENCY DEPT VISIT MOD MDM: CPT

## 2019-02-15 PROCEDURE — 96361 HYDRATE IV INFUSION ADD-ON: CPT

## 2019-02-15 RX ORDER — CLINDAMYCIN PHOSPHATE 600 MG/50ML
600 INJECTION INTRAVENOUS ONCE
Status: COMPLETED | OUTPATIENT
Start: 2019-02-15 | End: 2019-02-15

## 2019-02-15 RX ORDER — ONDANSETRON 2 MG/ML
4 INJECTION INTRAMUSCULAR; INTRAVENOUS ONCE
Status: COMPLETED | OUTPATIENT
Start: 2019-02-15 | End: 2019-02-15

## 2019-02-15 RX ORDER — CLINDAMYCIN HYDROCHLORIDE 150 MG/1
300 CAPSULE ORAL EVERY 6 HOURS
Qty: 56 CAPSULE | Refills: 0 | Status: SHIPPED | OUTPATIENT
Start: 2019-02-15 | End: 2019-02-22

## 2019-02-15 RX ADMIN — IOHEXOL 100 ML: 350 INJECTION, SOLUTION INTRAVENOUS at 11:10

## 2019-02-15 RX ADMIN — CLINDAMYCIN IN 5 PERCENT DEXTROSE 600 MG: 12 INJECTION, SOLUTION INTRAVENOUS at 10:13

## 2019-02-15 RX ADMIN — ONDANSETRON 4 MG: 2 INJECTION INTRAMUSCULAR; INTRAVENOUS at 10:04

## 2019-02-15 RX ADMIN — SODIUM CHLORIDE 1000 ML: 9 INJECTION, SOLUTION INTRAVENOUS at 10:36

## 2019-02-15 NOTE — ED PROVIDER NOTES
History  Chief Complaint   Patient presents with    Abdominal Pain     hysterectomy 2 weeks ago and area on stomach looks red and it look like a stitch might have come out; I spoke with doctor yesterday and was told to come here because I couldn't get where my doctor is; I didn't have a ride yesterdsay  Denies fevers; rate pain 8/10; last pain medication yesterday  States feeling dizzy since this morning; not slept last night  This is a 47-year-old female who presents emergency department abdominal pain  The patient had a hysterectomy performed approximately 2 weeks ago  It has been healing well until 5 7 days ago where she started having a small amount of drainage and some redness  The patient states that she could not get into her gynecologist's office this week to to the recent snow and ice  Patient has been having episodes of chills  There is some drainage from the superior aspect of the incision  Some generalized abdominal pain  Positive nausea  No radiation of symptoms  No other aggravating or alleviating factors  Symptoms moderate severity  Progressive in nature  Differential diagnosis includes cellulitis, abdominal wall abscess, no visible signs of dehiscence on exam           Prior to Admission Medications   Prescriptions Last Dose Informant Patient Reported?  Taking?   docusate sodium (COLACE) 100 mg capsule   No No   Sig: Take 1 capsule (100 mg total) by mouth 2 (two) times a day for 10 days   ondansetron (ZOFRAN-ODT) 4 mg disintegrating tablet   No No   Sig: Take 1 tablet (4 mg total) by mouth every 8 (eight) hours as needed for nausea or vomiting   oxyCODONE-acetaminophen (PERCOCET) 5-325 mg per tablet   No No   Sig: Take 1-2 tablets by mouth every 6 (six) hours as needed for severe pain Max Daily Amount: 8 tablets      Facility-Administered Medications: None       Past Medical History:   Diagnosis Date    Anemia due to chronic blood loss     has had recent iron infusions    Anxiety  Asthma     Bipolar disorder ([facility] [address]     Depression     Dry cough     pt states allergy related    Environmental and seasonal allergies     Food allergy     raisins    Wears contact lenses        Past Surgical History:   Procedure Laterality Date    NO PAST SURGERIES      VT REMOVAL OF FALLOPIAN TUBE Bilateral 2019    Procedure: SALPINGECTOMY;  Surgeon: Lauri Cruz MD;  [facility];  Service: Gynecology    VT TOTAL ABDOM HYSTERECTOMY N/A 2019    Procedure: HYSTERECTOMY TOTAL ABDOMINAL (ANTONIO); Surgeon: Lauri Cruz MD;  [facility];  Service: Gynecology       Family History   Problem Relation Age of Onset    Colon cancer Father          age 37    Prostate cancer Father     Cirrhosis Father     Alcohol abuse Father     Sleep apnea Father     Cancer Maternal Grandmother     Other Mother         hysterectomy    No Known Problems Sister     No Known Problems Brother     Other Paternal Grandmother          during surgery    No Known Problems Brother     Breast cancer Neg Hx     Ovarian cancer Neg Hx      I have reviewed and agree with the history as documented  Social History     Tobacco Use    Smoking status: Never Smoker    Smokeless tobacco: Never Used   Substance Use Topics    Alcohol use: Yes     Comment: few x year    Drug use: Yes     Frequency: 3 0 times per week     Types: Marijuana     Comment: 6 blunts/week; h o trying cocaine last used on Wednesday         Review of Systems   Constitutional: Negative for activity change, appetite change and fever  HENT: Negative for congestion, ear pain, rhinorrhea and sore throat  Eyes: Negative for pain and redness  Respiratory: Negative for cough, shortness of breath and wheezing  Cardiovascular: Negative for chest pain and palpitations  Gastrointestinal: Positive for abdominal pain  Negative for diarrhea, nausea and vomiting  Endocrine: Negative for polyuria     Genitourinary: Negative for difficulty urinating, dysuria, frequency and urgency  Musculoskeletal: Negative for arthralgias and myalgias  Skin: Positive for color change and wound  Negative for rash  Allergic/Immunologic: Negative for immunocompromised state  Neurological: Negative for dizziness, syncope and light-headedness  Hematological: Does not bruise/bleed easily  Psychiatric/Behavioral: Negative for confusion  All other systems reviewed and are negative  Physical Exam  Physical Exam   Constitutional: She is oriented to person, place, and time  She appears well-developed and well-nourished  No distress  HENT:   Head: Normocephalic and atraumatic  Nose: Nose normal    Eyes: Conjunctivae are normal  No scleral icterus  Neck: Normal range of motion  Neck supple  Cardiovascular: Normal rate, regular rhythm, normal heart sounds and intact distal pulses  Pulmonary/Chest: Effort normal and breath sounds normal  No stridor  No respiratory distress  She has no wheezes  Abdominal: Soft  She exhibits no distension  There is tenderness  There is no rebound and no guarding  Patient with midline vertical incision on her abdomen  The lower aspect of this has small area of opening just the skin surface  It is granulating well  The superior aspect has erythema and induration with small amount of drainage from incision  This is tender to palpation  Musculoskeletal: She exhibits no edema or deformity  Neurological: She is alert and oriented to person, place, and time  Skin: Skin is warm  No rash noted  There is erythema  Psychiatric: She has a normal mood and affect  Thought content normal    Nursing note and vitals reviewed        Vital Signs  ED Triage Vitals   Temperature Pulse Respirations Blood Pressure SpO2   02/15/19 0918 02/15/19 0918 02/15/19 0918 02/15/19 0918 02/15/19 0918   (!) 97 3 °F (36 3 °C) 94 20 143/95 99 %      Temp Source Heart Rate Source Patient Position - Orthostatic VS BP Location FiO2 (%)   02/15/19 0918 02/15/19 0918 02/15/19 0918 02/15/19 0918 --   Tymp Core Monitor Lying Left arm       Pain Score       02/15/19 1257       No Pain           Vitals:    02/15/19 0918 02/15/19 1124 02/15/19 1257   BP: 143/95 155/90 143/87   Pulse: 94 99 78   Patient Position - Orthostatic VS: Lying Lying Lying       Visual Acuity      ED Medications  Medications   sodium chloride 0 9 % bolus 1,000 mL (0 mL Intravenous Stopped 2/15/19 1250)   clindamycin (CLEOCIN) IVPB (premix) 600 mg (0 mg Intravenous Stopped 2/15/19 1134)   ondansetron (ZOFRAN) injection 4 mg (4 mg Intravenous Given 2/15/19 1004)   iohexol (OMNIPAQUE) 350 MG/ML injection (MULTI-DOSE) 100 mL (100 mL Intravenous Given 2/15/19 1110)       Diagnostic Studies  Results Reviewed     Procedure Component Value Units Date/Time    UA w Reflex to Microscopic [[id number]]  (Normal) Collected:  02/15/19 1130    Lab Status:  Final result Specimen:  Urine, Clean Catch Updated:  02/15/19 1153     Color, UA Straw     Clarity, UA Clear     Specific Gravity, UA 1 015     pH, UA 8 0     Leukocytes, UA Negative     Nitrite, UA Negative     Protein, UA Negative mg/dl      Glucose, UA Negative mg/dl      Ketones, UA Negative mg/dl      Bilirubin, UA Negative     Blood, UA Negative     UROBILINOGEN UA Negative mg/dL     Comprehensive metabolic panel [[id number]]  (Abnormal) Collected:  02/15/19 1008    Lab Status:  Final result Specimen:  Blood from Arm, Right Updated:  02/15/19 1043     Sodium 137 mmol/L      Potassium 5 7 mmol/L      Chloride 106 mmol/L      CO2 24 mmol/L      ANION GAP 7 mmol/L      BUN 10 mg/dL      Creatinine 0 55 mg/dL      Glucose 79 mg/dL      Calcium 8 6 mg/dL      AST 49 U/L      ALT 15 U/L      Alkaline Phosphatase 46 U/L      Total Protein 7 8 g/dL      Albumin 4 3 g/dL      Total Bilirubin 1 00 mg/dL      eGFR 145 ml/min/1 73sq m     Narrative:       Hemolysis  National Kidney Disease Education Program recommendations are as follows:  GFR calculation is accurate only with a steady state creatinine  Chronic Kidney disease less than 60 ml/min/1 73 sq  meters  Kidney failure less than 15 ml/min/1 73 sq  meters  CBC and differential [[id number]]  (Abnormal) Collected:  02/15/19 1008    Lab Status:  Final result Specimen:  Blood from Arm, Right Updated:  02/15/19 1036     WBC 8 20 Thousand/uL      RBC 5 06 Million/uL      Hemoglobin 10 9 g/dL      Hematocrit 36 6 %      MCV 72 fL      MCH 21 6 pg      MCHC 29 9 g/dL      RDW 28 1 %      MPV 7 5 fL      Platelets 651 Thousands/uL      Neutrophils Relative 56 %      Lymphocytes Relative 29 %      Monocytes Relative 9 %      Eosinophils Relative 5 %      Basophils Relative 1 %      Neutrophils Absolute 4 60 Thousands/µL      Lymphocytes Absolute 2 40 Thousands/µL      Monocytes Absolute 0 70 Thousand/µL      Eosinophils Absolute 0 40 Thousand/µL      Basophils Absolute 0 10 Thousands/µL                  CT abdomen pelvis with contrast   Final Result by Eleni Peña MD (02/15 1318)   At 3 3 x 3 8 cm fluid-containing area containing suture material and small foci of air within it in the lower anterior abdominal wall at the incision site  Additional smaller fluid-containing area in the periumbilical region suggest a seroma, measuring 1 7 x 0 7 cm      Enlargement of the left ovary with septated cyst which measures 4 4 x 4 2 cm x 6 cm, can be evaluated with ultrasound   I personally discussed this study with Chanel Angelo on 2/15/2019 at 1:14 PM                         Workstation performed: [id number]                    Procedures  Procedures       Phone Contacts  ED Phone Contact    ED Course  ED Course as of Feb 19 1603   Fri Feb 15, 2019   1326 I spoke with Dr Nicanor Crews  We will start patient on Clindamycin  F/u in one week in Dr Crystal Young office                                    MDM    Disposition  Final diagnoses:   Cellulitis   Ovarian cyst     Time reflects when diagnosis was documented in both MDM as applicable and the Disposition within this note     Time User Action Codes Description Comment    2/15/2019  1:34 PM Mathew Cruz Add [L03 90] Cellulitis     2/15/2019  1:34 PM Mathew Cruz Add [Q21 984] Ovarian cyst       ED Disposition     ED Disposition Condition Date/Time Comment    Discharge Stable Fri Feb 15, 2019  1:34 PM Wilner Gilmore discharge to home/self care  Follow-up Information     Follow up With Specialties Details Why Contact Info    Devin Warren MD Obstetrics and Gynecology, Obstetrics, Gynecology In 1 week For re-evaluation and follow-up for this visit  [address]  [address]   [phone]            Discharge Medication List as of 2/15/2019  1:36 PM      START taking these medications    Details   clindamycin (CLEOCIN) 150 mg capsule Take 2 capsules (300 mg total) by mouth every 6 (six) hours for 7 days, Starting Fri 2/15/2019, Until Fri 2/22/2019, Print         CONTINUE these medications which have NOT CHANGED    Details   docusate sodium (COLACE) 100 mg capsule Take 1 capsule (100 mg total) by mouth 2 (two) times a day for 10 days, Starting Sun 2/3/2019, Until Wed 2/13/2019, Print      ondansetron (ZOFRAN-ODT) 4 mg disintegrating tablet Take 1 tablet (4 mg total) by mouth every 8 (eight) hours as needed for nausea or vomiting, Starting Sun 2/3/2019, Print      oxyCODONE-acetaminophen (PERCOCET) 5-325 mg per tablet Take 1-2 tablets by mouth every 6 (six) hours as needed for severe pain Max Daily Amount: 8 tablets, Starting Thu 2/7/2019, Normal           No discharge procedures on file      ED Provider  Electronically Signed by           Luana Wood MD  02/19/19 6557

## 2019-02-25 ENCOUNTER — LAB REQUISITION (OUTPATIENT)
Dept: LAB | Facility: HOSPITAL | Age: 29
End: 2019-02-25
Payer: COMMERCIAL

## 2019-02-25 ENCOUNTER — OFFICE VISIT (OUTPATIENT)
Dept: OBGYN CLINIC | Facility: CLINIC | Age: 29
End: 2019-02-25

## 2019-02-25 VITALS
DIASTOLIC BLOOD PRESSURE: 80 MMHG | HEIGHT: 66 IN | HEART RATE: 100 BPM | BODY MASS INDEX: 37.28 KG/M2 | SYSTOLIC BLOOD PRESSURE: 122 MMHG | WEIGHT: 232 LBS

## 2019-02-25 DIAGNOSIS — L98.491 NON-PRESSURE CHRONIC ULCER OF SKIN OF OTHER SITES LIMITED TO BREAKDOWN OF SKIN (HCC): ICD-10-CM

## 2019-02-25 DIAGNOSIS — L98.491 ABDOMINAL WALL SKIN ULCER, LIMITED TO BREAKDOWN OF SKIN (HCC): Primary | ICD-10-CM

## 2019-02-25 PROCEDURE — 99024 POSTOP FOLLOW-UP VISIT: CPT | Performed by: OBSTETRICS & GYNECOLOGY

## 2019-02-25 PROCEDURE — 87070 CULTURE OTHR SPECIMN AEROBIC: CPT | Performed by: OBSTETRICS & GYNECOLOGY

## 2019-02-25 PROCEDURE — 87077 CULTURE AEROBIC IDENTIFY: CPT | Performed by: OBSTETRICS & GYNECOLOGY

## 2019-02-25 PROCEDURE — 87205 SMEAR GRAM STAIN: CPT | Performed by: OBSTETRICS & GYNECOLOGY

## 2019-02-25 PROCEDURE — 87186 SC STD MICRODIL/AGAR DIL: CPT | Performed by: OBSTETRICS & GYNECOLOGY

## 2019-02-25 RX ORDER — CLINDAMYCIN HYDROCHLORIDE 300 MG/1
300 CAPSULE ORAL 3 TIMES DAILY
Qty: 30 CAPSULE | Refills: 0 | Status: SHIPPED | OUTPATIENT
Start: 2019-02-25 | End: 2019-02-28

## 2019-02-25 NOTE — PROGRESS NOTES
Patient is a 30 y o  Ji Sahu with Patient's last menstrual period was 2019 (exact date)  who presents in follow up to cellulitis of abdominal skin incision  Pt was seen at [facility] and was noted to have a seroma and an infection at the top of her incision near the umbilicus  Pt was given a prescription for Clindamycin secondary to a PCN allergy  Pt reports she took one pill twice daily instead of 4x/day and stopped after a week  She reports the top half of her incision looks good, but the bottom half has some drainage that does no have a nice odor  She denies any pain, fevers/chills  Pt reports she has been putting a bandaid over the area  Past Medical History:   Diagnosis Date    Anemia due to chronic blood loss     has had recent iron infusions    Anxiety     Asthma     Bipolar disorder (HCC)     Depression     Dry cough     pt states allergy related    Environmental and seasonal allergies     Fibroid     Food allergy     raisins    Varicella     Wears contact lenses        Past Surgical History:   Procedure Laterality Date    NO PAST SURGERIES      TN REMOVAL OF FALLOPIAN TUBE Bilateral 2019    Procedure: SALPINGECTOMY;  Surgeon: Lexus Zaidi MD;  [facility];  Service: Gynecology    TN TOTAL ABDOM HYSTERECTOMY N/A 2019    Procedure: HYSTERECTOMY TOTAL ABDOMINAL (ANTONIO);   Surgeon: Lexus Zaidi MD;  [facility];  Service: Gynecology       OB History    Para Term  AB Living   0 0 0 0 0 0   SAB TAB Ectopic Multiple Live Births   0 0 0 0 0   Obstetric Comments   Menarche: 8      Menses: 30-60/3-4/super plus tampon every 2 hours on day 1 and then every 4 hours           Current Outpatient Medications:     ondansetron (ZOFRAN-ODT) 4 mg disintegrating tablet, Take 1 tablet (4 mg total) by mouth every 8 (eight) hours as needed for nausea or vomiting, Disp: 30 tablet, Rfl: 0    clindamycin (CLEOCIN) 300 MG capsule, Take 1 capsule (300 mg total) by mouth 3 (three) times a day for 10 days, Disp: 30 capsule, Rfl: 0    docusate sodium (COLACE) 100 mg capsule, Take 1 capsule (100 mg total) by mouth 2 (two) times a day for 10 days, Disp: 20 capsule, Rfl: 0    Allergies   Allergen Reactions    Amoxicillin Tongue Swelling and Facial Swelling    Penicillins      Unsure of reaction        Social History     Socioeconomic History    Marital status: Significant Other     Spouse name: Linwood Johnson Number of children: 0    Years of education: HS    Highest education level: None   Occupational History    Occupation: unemployed   Social Needs    Financial resource strain: None    Food insecurity:     Worry: None     Inability: None    Transportation needs:     Medical: None     Non-medical: None   Tobacco Use    Smoking status: Never Smoker    Smokeless tobacco: Never Used   Substance and Sexual Activity    Alcohol use: Yes     Frequency: Monthly or less     Comment: socially    Drug use: Yes     Frequency: 3 0 times per week     Types: Marijuana     Comment: 6 blunts/week; h o trying cocaine last used on Wednesday     Sexual activity: Yes     Partners: Female     Birth control/protection: Female Sterilization     Comment: lifetime sexual partners: >50; current partner: 4 years;  Pt reports she has had sex with men, but has been only have sex with women since age 10   Lifestyle    Physical activity:     Days per week: None     Minutes per session: None    Stress: None   Relationships    Social connections:     Talks on phone: None     Gets together: None     Attends Shinto service: None     Active member of club or organization: None     Attends meetings of clubs or organizations: None     Relationship status: None    Intimate partner violence:     Fear of current or ex partner: None     Emotionally abused: None     Physically abused: None     Forced sexual activity: None   Other Topics Concern    None   Social History Narrative    Amish: no preference Accepts blood products        Exercise: none    Calcium: occ cheese       Family History   Problem Relation Age of Onset    Colon cancer Father          age 40    Prostate cancer Father     Cirrhosis Father     Alcohol abuse Father     Sleep apnea Father     Cancer Maternal Grandmother     Other Mother         hysterectomy    No Known Problems Sister     No Known Problems Brother     Other Paternal Grandmother          during surgery    No Known Problems Brother     Breast cancer Neg Hx     Ovarian cancer Neg Hx        Review of Systems   Constitutional: Negative for chills, fatigue, fever and unexpected weight change  HENT: Negative for congestion, mouth sores and sore throat  Respiratory: Negative for cough, chest tightness, shortness of breath and wheezing  Cardiovascular: Negative for chest pain and palpitations  Gastrointestinal: Negative for abdominal distention, abdominal pain, constipation, diarrhea, nausea and vomiting  Drainage from abdominal incision   Endocrine: Negative for cold intolerance and heat intolerance  Genitourinary: Negative for dyspareunia, dysuria, genital sores, menstrual problem, pelvic pain, vaginal bleeding, vaginal discharge and vaginal pain  Musculoskeletal: Negative for arthralgias  Skin: Negative for color change and rash  Neurological: Negative for dizziness, light-headedness and headaches  Hematological: Negative for adenopathy  Blood pressure 122/80, pulse 100, height 5' 6" (1 676 m), weight 105 kg (232 lb), last menstrual period 2019, not currently breastfeeding  and Body mass index is 37 45 kg/m²  Physical Exam   Constitutional: She is oriented to person, place, and time  She appears well-developed and well-nourished  HENT:   Head: Normocephalic and atraumatic  Eyes: Conjunctivae and EOM are normal    Neck: Normal range of motion  Pulmonary/Chest: Effort normal    Abdominal: Soft  She exhibits no mass  There is no tenderness  There is no rebound and no guarding  Neurological: She is alert and oriented to person, place, and time  Skin: Skin is warm  No rash noted  No erythema  Psychiatric: She has a normal mood and affect  Her behavior is normal  Judgment and thought content normal      Incision: midline vertical--well healed without signs of infection from umbilicus until just above the hairline under patient's pannus  There is an area of denuded skin, superficial approximately 2 cm wide by 2 cm in length, circular in nature, weeping  Cultures obtained  A/P:  Pt is a 30 y o  [employer] [facility] with      Diagnoses and all orders for this visit:    Abdominal wall skin ulcer, limited to breakdown of skin (HCC)  -     clindamycin (CLEOCIN) 300 MG capsule; Take 1 capsule (300 mg total) by mouth 3 (three) times a day for 10 days  -     Culture, Aerobic and Anaerobic w/Gram Stain      Will call pt with culture results  Follow up 1 week

## 2019-02-27 LAB
BACTERIA WND AEROBE CULT: ABNORMAL
BACTERIA WND AEROBE CULT: ABNORMAL
GRAM STN SPEC: ABNORMAL
GRAM STN SPEC: ABNORMAL

## 2019-02-28 ENCOUNTER — TELEPHONE (OUTPATIENT)
Dept: OBGYN CLINIC | Facility: CLINIC | Age: 29
End: 2019-02-28

## 2019-02-28 DIAGNOSIS — L98.491 ABDOMINAL WALL SKIN ULCER, LIMITED TO BREAKDOWN OF SKIN (HCC): Primary | ICD-10-CM

## 2019-02-28 PROBLEM — D25.9 FIBROID UTERUS: Status: RESOLVED | Noted: 2019-01-14 | Resolved: 2019-02-28

## 2019-02-28 RX ORDER — LEVOFLOXACIN 750 MG/1
750 TABLET ORAL EVERY 24 HOURS
Qty: 10 TABLET | Refills: 0 | Status: SHIPPED | OUTPATIENT
Start: 2019-02-28 | End: 2019-03-10

## 2019-02-28 NOTE — TELEPHONE ENCOUNTER
Called patient, but she is not available, spoke to her partner  Reviewed that wound culture reveals proteus mirabilis and the antibiotics that Janene is on as not tested for sensitivity  Changing antibiotics to Levaquin and rx sent to pharmacy  Patient's partner reports she will  the medication

## 2019-03-14 ENCOUNTER — OFFICE VISIT (OUTPATIENT)
Dept: OBGYN CLINIC | Facility: CLINIC | Age: 29
End: 2019-03-14

## 2019-03-14 VITALS
BODY MASS INDEX: 38.32 KG/M2 | SYSTOLIC BLOOD PRESSURE: 118 MMHG | OXYGEN SATURATION: 98 % | HEART RATE: 82 BPM | DIASTOLIC BLOOD PRESSURE: 80 MMHG | WEIGHT: 237.4 LBS

## 2019-03-14 DIAGNOSIS — Z90.79 STATUS POST BILATERAL SALPINGECTOMY: ICD-10-CM

## 2019-03-14 DIAGNOSIS — N83.202 LEFT OVARIAN CYST: ICD-10-CM

## 2019-03-14 DIAGNOSIS — Z90.710 S/P TAH (TOTAL ABDOMINAL HYSTERECTOMY): Primary | ICD-10-CM

## 2019-03-14 PROCEDURE — 99024 POSTOP FOLLOW-UP VISIT: CPT | Performed by: OBSTETRICS & GYNECOLOGY

## 2019-03-14 NOTE — PROGRESS NOTES
Patient is a 30 y o  Peggi Mira with Patient's last menstrual period was 2019 (exact date)  who presents for post operative examination s/p ANTONIO/BS with removal of large pelvic mass (fibroid on pathology)  Pt's postoperative course complicated by cellulitis of inferior portion of her incision with drainage  This was cultured and patient was started on abx  Pt reports she no longer has drainage from her incision and the area has completely closed  She denies F/C  Pt denies abnormal vaginal discharge or bleeding  She reports she feels well and would like to return to work  Past Medical History:   Diagnosis Date    Anemia due to chronic blood loss     has had recent iron infusions    Anxiety     Asthma     Bipolar disorder (HCC)     Depression     Dry cough     pt states allergy related    Environmental and seasonal allergies     Fibroid     Fibroid uterus 2019    Food allergy     raisins    Varicella     Wears contact lenses        Past Surgical History:   Procedure Laterality Date    NO PAST SURGERIES      ME REMOVAL OF FALLOPIAN TUBE Bilateral 2019    Procedure: SALPINGECTOMY;  Surgeon: Lili Haider MD;  [facility];  Service: Gynecology    ME TOTAL ABDOM HYSTERECTOMY N/A 2019    Procedure: HYSTERECTOMY TOTAL ABDOMINAL (ANTONIO);   Surgeon: Lili Haider MD;  [facility];  Service: Gynecology       OB History    Para Term  AB Living   0 0 0 0 0 0   SAB TAB Ectopic Multiple Live Births   0 0 0 0 0   Obstetric Comments   Menarche: 8      Menses: 30-60/3-4/super plus tampon every 2 hours on day 1 and then every 4 hours         Current Outpatient Medications:     Loratadine (CLARITIN PO), Take by mouth, Disp: , Rfl:     Allergies   Allergen Reactions    Amoxicillin Tongue Swelling and Facial Swelling    Penicillins      Unsure of reaction        Social History     Socioeconomic History    Marital status: Significant Other     Spouse name: Ray Skaggs  Number of children: 0    Years of education: HS    Highest education level: Not on file   Occupational History    Occupation: unemployed   Social Needs    Financial resource strain: Not on file    Food insecurity:     Worry: Not on file     Inability: Not on file   [employer] needs:     Medical: Not on file     Non-medical: Not on file   Tobacco Use    Smoking status: Never Smoker    Smokeless tobacco: Never Used   Substance and Sexual Activity    Alcohol use: Yes     Frequency: Monthly or less     Comment: socially    Drug use: Yes     Frequency: 3 0 times per week     Types: Marijuana     Comment: 6 blunts/week; h o trying cocaine last used on Wednesday     Sexual activity: Yes     Partners: Female     Birth control/protection: Female Sterilization     Comment: lifetime sexual partners: >50; current partner: 4 years;  Pt reports she has had sex with men, but has been only have sex with women since age 10   Lifestyle    Physical activity:     Days per week: Not on file     Minutes per session: Not on file    Stress: Not on file   Relationships    Social connections:     Talks on phone: Not on file     Gets together: Not on file     Attends [employer] service: Not on file     Active member of club or organization: Not on file     Attends meetings of clubs or organizations: Not on file     Relationship status: Not on file    Intimate partner violence:     Fear of current or ex partner: Not on file     Emotionally abused: Not on file     Physically abused: Not on file     Forced sexual activity: Not on file   Other Topics Concern    Not on file   Social History Narrative    Shinto: no preference    Accepts blood products        Exercise: none    Calcium: occ cheese       Family History   Problem Relation Age of Onset    Colon cancer Father          age 40    Prostate cancer Father     Cirrhosis Father     Alcohol abuse Father     Sleep apnea Father     Cancer Maternal Grandmother  Other Mother         hysterectomy    No Known Problems Sister     No Known Problems Brother     Other Paternal Grandmother          during surgery    No Known Problems Brother     Breast cancer Neg Hx     Ovarian cancer Neg Hx        Review of Systems   Constitutional: Negative for chills, fatigue, fever and unexpected weight change  HENT: Negative for congestion, mouth sores and sore throat  Respiratory: Negative for cough, chest tightness, shortness of breath and wheezing  Cardiovascular: Negative for chest pain and palpitations  Gastrointestinal: Negative for abdominal distention, abdominal pain, constipation, diarrhea, nausea and vomiting  Endocrine: Negative for cold intolerance and heat intolerance  Genitourinary: Negative for dyspareunia, dysuria, genital sores, menstrual problem, pelvic pain, vaginal bleeding, vaginal discharge and vaginal pain  Musculoskeletal: Negative for arthralgias  Skin: Negative for color change and rash  Neurological: Negative for dizziness, light-headedness and headaches  Hematological: Negative for adenopathy  Blood pressure 118/80, pulse 82, weight 108 kg (237 lb 6 4 oz), last menstrual period 2019, SpO2 98 %, not currently breastfeeding  and Body mass index is 38 32 kg/m²  Physical Exam   Constitutional: She appears well-developed and well-nourished  HENT:   Head: Normocephalic and atraumatic  Eyes: Conjunctivae are normal    Neck: Normal range of motion  Pulmonary/Chest: Effort normal    Abdominal: Soft  Bowel sounds are normal  She exhibits no distension and no mass  There is no tenderness  There is no rebound and no guarding  Incision well healed   No drainage, erythema, or signs of infection noted        vulva: normal external genitalia for age and no lesions, masses, epithelial changes, or exudate  vagina: color pink, rugae  well formed rugae and discharge  white  cervix: surgically absent and vaginal cuff is well healed  uterus: surgically absent  adnexa: no masses or tenderness      A/P:  Pt is a 30 y o  Desire Brine with      Diagnoses and all orders for this visit:    S/P ANTONIO (total abdominal hysterectomy)    Status post bilateral salpingectomy    Left ovarian cyst  -     US pelvis complete w transvaginal; Future          Doing well s/p ANTONIO/BS  Pt was noted to have a 4 cm adnexal mass at time of CT scan in ED on 2/15/19  Will follow up with pelvic u/s in 6 weeks to ensure resolution

## 2019-10-29 ENCOUNTER — APPOINTMENT (EMERGENCY)
Dept: RADIOLOGY | Facility: HOSPITAL | Age: 29
End: 2019-10-29
Payer: COMMERCIAL

## 2019-10-29 ENCOUNTER — HOSPITAL ENCOUNTER (EMERGENCY)
Facility: HOSPITAL | Age: 29
Discharge: HOME | End: 2019-10-29
Attending: EMERGENCY MEDICINE
Payer: COMMERCIAL

## 2019-10-29 VITALS
TEMPERATURE: 96.8 F | RESPIRATION RATE: 16 BRPM | BODY MASS INDEX: 38.18 KG/M2 | DIASTOLIC BLOOD PRESSURE: 94 MMHG | SYSTOLIC BLOOD PRESSURE: 133 MMHG | HEART RATE: 71 BPM | WEIGHT: 236.55 LBS | OXYGEN SATURATION: 99 %

## 2019-10-29 DIAGNOSIS — M62.838 MUSCLE SPASM: Primary | ICD-10-CM

## 2019-10-29 DIAGNOSIS — R07.9 CHEST PAIN: ICD-10-CM

## 2019-10-29 DIAGNOSIS — M54.9 BACK PAIN: ICD-10-CM

## 2019-10-29 LAB
ALBUMIN SERPL BCP-MCNC: 3.9 G/DL (ref 3–5.2)
ALP SERPL-CCNC: 72 U/L (ref 43–122)
ALT SERPL W P-5'-P-CCNC: 24 U/L (ref 9–52)
ANION GAP SERPL CALCULATED.3IONS-SCNC: 6 MMOL/L (ref 5–14)
ANISOCYTOSIS BLD QL SMEAR: PRESENT
AST SERPL W P-5'-P-CCNC: 23 U/L (ref 14–36)
ATRIAL RATE: 63 BPM
BASOPHILS # BLD AUTO: 0.1 THOUSANDS/ΜL (ref 0–0.1)
BASOPHILS NFR BLD AUTO: 1 % (ref 0–1)
BILIRUB SERPL-MCNC: 0.4 MG/DL
BILIRUB UR QL STRIP: NEGATIVE
BUN SERPL-MCNC: 10 MG/DL (ref 5–25)
CALCIUM SERPL-MCNC: 8.9 MG/DL (ref 8.4–10.2)
CHLORIDE SERPL-SCNC: 106 MMOL/L (ref 97–108)
CLARITY UR: CLEAR
CO2 SERPL-SCNC: 27 MMOL/L (ref 22–30)
COLOR UR: YELLOW
CREAT SERPL-MCNC: 0.71 MG/DL (ref 0.6–1.2)
EOSINOPHIL # BLD AUTO: 0.2 THOUSAND/ΜL (ref 0–0.4)
EOSINOPHIL NFR BLD AUTO: 3 % (ref 0–6)
ERYTHROCYTE [DISTWIDTH] IN BLOOD BY AUTOMATED COUNT: 15.1 %
EXT PREG TEST URINE: NEGATIVE
EXT. CONTROL ED NAV: NORMAL
GFR SERPL CREATININE-BSD FRML MDRD: 131 ML/MIN/1.73SQ M
GLUCOSE SERPL-MCNC: 85 MG/DL (ref 70–99)
GLUCOSE UR STRIP-MCNC: NEGATIVE MG/DL
HCT VFR BLD AUTO: 40.1 % (ref 36–46)
HGB BLD-MCNC: 13.1 G/DL (ref 12–16)
HGB UR QL STRIP.AUTO: NEGATIVE
HYPERCHROMIA BLD QL SMEAR: PRESENT
KETONES UR STRIP-MCNC: NEGATIVE MG/DL
LEUKOCYTE ESTERASE UR QL STRIP: NEGATIVE
LYMPHOCYTES # BLD AUTO: 2.1 THOUSANDS/ΜL (ref 0.5–4)
LYMPHOCYTES NFR BLD AUTO: 29 % (ref 25–45)
MCH RBC QN AUTO: 25.4 PG (ref 26–34)
MCHC RBC AUTO-ENTMCNC: 32.5 G/DL (ref 31–36)
MCV RBC AUTO: 78 FL (ref 80–100)
MICROCYTES BLD QL AUTO: PRESENT
MONOCYTES # BLD AUTO: 0.5 THOUSAND/ΜL (ref 0.2–0.9)
MONOCYTES NFR BLD AUTO: 7 % (ref 1–10)
NEUTROPHILS # BLD AUTO: 4.5 THOUSANDS/ΜL (ref 1.8–7.8)
NEUTS SEG NFR BLD AUTO: 61 % (ref 45–65)
NITRITE UR QL STRIP: NEGATIVE
P AXIS: 38 DEGREES
PH UR STRIP.AUTO: 5 [PH]
PLATELET # BLD AUTO: 476 THOUSANDS/UL (ref 150–450)
PLATELET BLD QL SMEAR: ADEQUATE
PMV BLD AUTO: 7.3 FL (ref 8.9–12.7)
POTASSIUM SERPL-SCNC: 3.8 MMOL/L (ref 3.6–5)
PR INTERVAL: 150 MS
PROT SERPL-MCNC: 7.4 G/DL (ref 5.9–8.4)
PROT UR STRIP-MCNC: NEGATIVE MG/DL
QRS AXIS: 34 DEGREES
QRSD INTERVAL: 84 MS
QT INTERVAL: 382 MS
QTC INTERVAL: 390 MS
RBC # BLD AUTO: 5.14 MILLION/UL (ref 4–5.2)
RBC MORPH BLD: NORMAL
SODIUM SERPL-SCNC: 139 MMOL/L (ref 137–147)
SP GR UR STRIP.AUTO: 1.02 (ref 1–1.04)
T WAVE AXIS: 2 DEGREES
TROPONIN I SERPL-MCNC: <0.01 NG/ML (ref 0–0.03)
UROBILINOGEN UA: NEGATIVE MG/DL
VENTRICULAR RATE: 63 BPM
WBC # BLD AUTO: 7.3 THOUSAND/UL (ref 4.5–11)

## 2019-10-29 PROCEDURE — 99285 EMERGENCY DEPT VISIT HI MDM: CPT | Performed by: EMERGENCY MEDICINE

## 2019-10-29 PROCEDURE — 93010 ELECTROCARDIOGRAM REPORT: CPT | Performed by: INTERNAL MEDICINE

## 2019-10-29 PROCEDURE — 85025 COMPLETE CBC W/AUTO DIFF WBC: CPT | Performed by: EMERGENCY MEDICINE

## 2019-10-29 PROCEDURE — 99284 EMERGENCY DEPT VISIT MOD MDM: CPT

## 2019-10-29 PROCEDURE — 71046 X-RAY EXAM CHEST 2 VIEWS: CPT

## 2019-10-29 PROCEDURE — 81025 URINE PREGNANCY TEST: CPT | Performed by: EMERGENCY MEDICINE

## 2019-10-29 PROCEDURE — 96361 HYDRATE IV INFUSION ADD-ON: CPT

## 2019-10-29 PROCEDURE — 84484 ASSAY OF TROPONIN QUANT: CPT | Performed by: EMERGENCY MEDICINE

## 2019-10-29 PROCEDURE — 93005 ELECTROCARDIOGRAM TRACING: CPT

## 2019-10-29 PROCEDURE — 36415 COLL VENOUS BLD VENIPUNCTURE: CPT | Performed by: EMERGENCY MEDICINE

## 2019-10-29 PROCEDURE — 96374 THER/PROPH/DIAG INJ IV PUSH: CPT

## 2019-10-29 PROCEDURE — 80053 COMPREHEN METABOLIC PANEL: CPT | Performed by: EMERGENCY MEDICINE

## 2019-10-29 RX ORDER — KETOROLAC TROMETHAMINE 30 MG/ML
30 INJECTION, SOLUTION INTRAMUSCULAR; INTRAVENOUS ONCE
Status: COMPLETED | OUTPATIENT
Start: 2019-10-29 | End: 2019-10-29

## 2019-10-29 RX ORDER — CYCLOBENZAPRINE HCL 10 MG
10 TABLET ORAL 2 TIMES DAILY PRN
Qty: 20 TABLET | Refills: 0 | Status: SHIPPED | OUTPATIENT
Start: 2019-10-29 | End: 2021-05-26 | Stop reason: ALTCHOICE

## 2019-10-29 RX ORDER — DIAZEPAM 5 MG/1
5 TABLET ORAL ONCE
Status: COMPLETED | OUTPATIENT
Start: 2019-10-29 | End: 2019-10-29

## 2019-10-29 RX ORDER — NAPROXEN 500 MG/1
500 TABLET ORAL 2 TIMES DAILY WITH MEALS
Qty: 30 TABLET | Refills: 0 | Status: SHIPPED | OUTPATIENT
Start: 2019-10-29 | End: 2021-05-26 | Stop reason: ALTCHOICE

## 2019-10-29 RX ADMIN — DIAZEPAM 5 MG: 5 TABLET ORAL at 18:34

## 2019-10-29 RX ADMIN — SODIUM CHLORIDE 1000 ML: 0.9 INJECTION, SOLUTION INTRAVENOUS at 17:12

## 2019-10-29 RX ADMIN — KETOROLAC TROMETHAMINE 30 MG: 30 INJECTION, SOLUTION INTRAMUSCULAR; INTRAVENOUS at 18:35

## 2019-10-29 NOTE — ED PROVIDER NOTES
History  Chief Complaint   Patient presents with    Back Pain     central upper back pain since last night  states back pain started as chest tightness  states she is also SOB  75-year-old presents with complaint of back pain, chest pain and shoulder pain  She reports that she works as a  and began to have discomfort yesterday evening when on shift  She applied [employer] and went to bed and whenever she awoke she had ongoing pain  Pain is worse with movement and palpation of the area  There are no focal neurological deficits or other acute issues or concerns  Back Pain   Location:  Thoracic spine  Quality:  Aching  Pain severity:  Moderate  Onset quality:  Gradual  Timing:  Constant  Progression:  Unchanged  Relieved by:  Nothing  Worsened by: Movement and palpation  Ineffective treatments:  OTC medications  Associated symptoms: chest pain    Associated symptoms: no abdominal pain, no abdominal swelling, no bladder incontinence, no bowel incontinence, no dysuria, no fever, no headaches, no leg pain, no numbness, no paresthesias, no pelvic pain, no perianal numbness, no tingling, no weakness and no weight loss        Prior to Admission Medications   Prescriptions Last Dose Informant Patient Reported? Taking?    Loratadine (CLARITIN PO)  Self Yes No   Sig: Take by mouth      Facility-Administered Medications: None       Past Medical History:   Diagnosis Date    Anemia due to chronic blood loss     has had recent iron infusions    Anxiety     Asthma     Bipolar disorder (HCC)     Depression     Dry cough     pt states allergy related    Environmental and seasonal allergies     Fibroid     Fibroid uterus 1/14/2019    Food allergy     raisins    Varicella     Wears contact lenses        Past Surgical History:   Procedure Laterality Date    NO PAST SURGERIES      [place] REMOVAL OF FALLOPIAN TUBE Bilateral 1/31/2019    Procedure: SALPINGECTOMY;  Surgeon: Ángela Magana MD;  [facility]; Service: Gynecology    [place] TOTAL ABDOM HYSTERECTOMY N/A 2019    Procedure: HYSTERECTOMY TOTAL ABDOMINAL (ANTONIO); Surgeon: Jena Telles MD;  [facility];  Service: Gynecology       Family History   Problem Relation Age of Onset    Colon cancer Father          age 37    Prostate cancer Father     Cirrhosis Father     Alcohol abuse Father     Sleep apnea Father     Cancer Maternal Grandmother     Other Mother         hysterectomy    No Known Problems Sister     No Known Problems Brother     Other Paternal Grandmother          during surgery    No Known Problems Brother     Breast cancer Neg Hx     Ovarian cancer Neg Hx      I have reviewed and agree with the history as documented  Social History     Tobacco Use    Smoking status: Never Smoker    Smokeless tobacco: Never Used   Substance Use Topics    Alcohol use: Yes     Frequency: Monthly or less     Comment: socially    Drug use: Yes     Types: Marijuana     Comment: weekends        Review of Systems   Constitutional: Negative for appetite change, chills, fatigue, fever and weight loss  HENT: Negative for postnasal drip, sinus pain and trouble swallowing  Eyes: Negative for redness and itching  Respiratory: Positive for chest tightness  Negative for shortness of breath and wheezing  Cardiovascular: Positive for chest pain  Negative for leg swelling  Gastrointestinal: Negative for abdominal pain, bowel incontinence, constipation, diarrhea, nausea and vomiting  Endocrine: Negative  Genitourinary: Negative for bladder incontinence, difficulty urinating, dysuria and pelvic pain  Musculoskeletal: Positive for back pain  Negative for myalgias  Skin: Negative for rash  Allergic/Immunologic: Negative  Neurological: Negative for dizziness, tingling, weakness, numbness, headaches and paresthesias  Hematological: Negative  Psychiatric/Behavioral: Negative          Physical Exam  Physical Exam Constitutional: She is oriented to person, place, and time  She appears well-developed and well-nourished  HENT:   Head: Normocephalic and atraumatic  Nose: Nose normal    Mouth/Throat: Oropharynx is clear and moist    Eyes: Pupils are equal, round, and reactive to light  Conjunctivae and EOM are normal    Neck: Normal range of motion  Neck supple  Cardiovascular: Normal rate, regular rhythm and normal heart sounds  Pulmonary/Chest: Effort normal and breath sounds normal  No respiratory distress  She has no wheezes  Abdominal: Soft  Bowel sounds are normal  There is no tenderness  There is no guarding  Musculoskeletal: She exhibits no edema or deformity  Cervical back: She exhibits tenderness  She exhibits no bony tenderness and no deformity  Back:    Neurological: She is alert and oriented to person, place, and time  Skin: Skin is warm and dry  Capillary refill takes less than 2 seconds  No rash noted  Psychiatric: She has a normal mood and affect  Her behavior is normal    Nursing note and vitals reviewed        Vital Signs  ED Triage Vitals [10/29/19 1626]   Temperature Pulse Respirations Blood Pressure SpO2   (!) 96 8 °F (36 °C) 89 20 146/89 98 %      Temp Source Heart Rate Source Patient Position - Orthostatic VS BP Location FiO2 (%)   Tympanic Monitor Standing Left arm --      Pain Score       8           Vitals:    10/29/19 1626 10/29/19 1836   BP: 146/89 133/94   Pulse: 89 71   Patient Position - Orthostatic VS: Standing Sitting         Visual Acuity      ED Medications  Medications   sodium chloride 0 9 % bolus 1,000 mL (0 mL Intravenous Stopped 10/29/19 1829)   diazepam (VALIUM) tablet 5 mg (5 mg Oral Given 10/29/19 1834)   ketorolac (TORADOL) injection 30 mg (30 mg Intravenous Given 10/29/19 1835)       Diagnostic Studies  Results Reviewed     Procedure Component Value Units Date/Time    Troponin I [[id number]]  (Normal) Collected:  10/29/19 1707    Lab Status:  Final result Specimen:  Blood from Line, Venous Updated:  10/29/19 1748     Troponin I <0 01 ng/mL     Comprehensive metabolic panel [[id number]]  (Normal) Collected:  10/29/19 1707    Lab Status:  Final result Specimen:  Blood from Line, Venous Updated:  10/29/19 1737     Sodium 139 mmol/L      Potassium 3 8 mmol/L      Chloride 106 mmol/L      CO2 27 mmol/L      ANION GAP 6 mmol/L      BUN 10 mg/dL      Creatinine 0 71 mg/dL      Glucose 85 mg/dL      Calcium 8 9 mg/dL      AST 23 U/L      ALT 24 U/L      Alkaline Phosphatase 72 U/L      Total Protein 7 4 g/dL      Albumin 3 9 g/dL      Total Bilirubin 0 40 mg/dL      eGFR 131 ml/min/1 73sq m     Narrative:       [place] guidelines for Chronic Kidney Disease (CKD):     Stage 1 with normal or high GFR (GFR > 90 mL/min/1 73 square meters)    Stage 2 Mild CKD (GFR = 60-89 mL/min/1 73 square meters)    Stage 3A Moderate CKD (GFR = 45-59 mL/min/1 73 square meters)    Stage 3B Moderate CKD (GFR = 30-44 mL/min/1 73 square meters)    Stage 4 Severe CKD (GFR = 15-29 mL/min/1 73 square meters)    Stage 5 End Stage CKD (GFR <15 mL/min/1 73 square meters)  Note: GFR calculation is accurate only with a steady state creatinine    UA w Reflex to Microscopic [[id number]]  (Normal) Collected:  10/29/19 1708    Lab Status:  Final result Specimen:  Urine, Clean Catch Updated:  10/29/19 1735     Color, UA Yellow     Clarity, UA Clear     Specific Cucumber, UA 1 020     pH, UA 5 0     Leukocytes, UA Negative     Nitrite, UA Negative     Protein, UA Negative mg/dl      Glucose, UA Negative mg/dl      Ketones, UA Negative mg/dl      Bilirubin, UA Negative     Blood, UA Negative     UROBILINOGEN UA Negative mg/dL     CBC and differential [[id number]]  (Abnormal) Collected:  10/29/19 1707    Lab Status:  Final result Specimen:  Blood from Line, Venous Updated:  10/29/19 1732     WBC 7 30 Thousand/uL      RBC 5 14 Million/uL      Hemoglobin 13 1 g/dL      Hematocrit 40 1 % MCV 78 fL      MCH 25 4 pg      MCHC 32 5 g/dL      RDW 15 1 %      MPV 7 3 fL      Platelets 015 Thousands/uL      Neutrophils Relative 61 %      Lymphocytes Relative 29 %      Monocytes Relative 7 %      Eosinophils Relative 3 %      Basophils Relative 1 %      Neutrophils Absolute 4 50 Thousands/µL      Lymphocytes Absolute 2 10 Thousands/µL      Monocytes Absolute 0 50 Thousand/µL      Eosinophils Absolute 0 20 Thousand/µL      Basophils Absolute 0 10 Thousands/µL     POCT pregnancy, urine [[id number]]  (Normal) Resulted:  10/29/19 1708    Lab Status:  Final result Updated:  10/29/19 1708     EXT PREG TEST UR (Ref: Negative) negative     Control valid                 XR chest 2 views   ED Interpretation by Rosita Antonio DO (10/29 1700)   No acute findings      Final Result by Shamar Quiñonez MD (10/29 2112)      No acute cardiopulmonary disease  Workstation performed: [id number]                    Procedures  ECG 12 Lead Documentation Only  Date/Time: 10/29/2019 4:49 PM  Performed by: Rosita Antonio DO  Authorized by:  Rosita Antonio DO     ECG reviewed by me, the ED Provider: yes    Patient location:  ED  Rate:     ECG rate:  63    ECG rate assessment: normal    Rhythm:     Rhythm: sinus rhythm    Ectopy:     Ectopy: none    QRS:     QRS axis:  Normal  Conduction:     Conduction: normal    ST segments:     ST segments:  Normal  T waves:     T waves: non-specific             ED Course                               MDM    Disposition  Final diagnoses:   Muscle spasm   Back pain   Chest pain     Time reflects when diagnosis was documented in both MDM as applicable and the Disposition within this note     Time User Action Codes Description Comment    10/29/2019  5:53 PM Teresa Fragmin Add [Q39 417] Muscle spasm     10/29/2019  5:53 PM Teresa Fragmin Add [M54 9] Back pain     10/29/2019  5:53 PM Teresa Fragmin Add [R07 9] Chest pain       ED Disposition     ED Disposition Condition Date/Time Comment Discharge Stable Tue Oct 29, 2019  5:53 PM Jose R Calloway discharge to home/self care  Follow-up Information     Follow up With Specialties Details Why Contact Info    Bernice Glover DO Family Medicine Call   Bari Barr [address] [place] [address] [address] [place] [address] Emergency Department Emergency Medicine  If symptoms worsen [address] [address] [phone]          Discharge Medication List as of 10/29/2019  5:55 PM      START taking these medications    Details   cyclobenzaprine (FLEXERIL) 10 mg tablet Take 1 tablet (10 mg total) by mouth 2 (two) times a day as needed for muscle spasms, Starting Tue 10/29/2019, Print      naproxen (NAPROSYN) 500 mg tablet Take 1 tablet (500 mg total) by mouth 2 (two) times a day with meals, Starting Tue 10/29/2019, Print         CONTINUE these medications which have NOT CHANGED    Details   Loratadine (CLARITIN PO) Take by mouth, Historical Med           No discharge procedures on file      ED Provider  Electronically Signed by           Andrew Casey DO  10/29/19 3597

## 2019-11-03 LAB
ATRIAL RATE: 63 BPM
P AXIS: 38 DEGREES
PR INTERVAL: 150 MS
QRS AXIS: 34 DEGREES
QRSD INTERVAL: 84 MS
QT INTERVAL: 382 MS
QTC INTERVAL: 390 MS
T WAVE AXIS: 2 DEGREES
VENTRICULAR RATE: 63 BPM

## 2019-11-03 PROCEDURE — 93010 ELECTROCARDIOGRAM REPORT: CPT | Performed by: INTERNAL MEDICINE

## 2020-06-22 NOTE — DISCHARGE INSTRUCTIONS
Abdominal Hysterectomy   WHAT YOU SHOULD KNOW:   An abdominal hysterectomy (AH) is surgery to remove your uterus  Your uterus will be removed through an incision in your abdomen  You may need an AH if you have a tumor in your uterus or other reproductive organs  You may also need an AH if you have an infection, pain, or bleeding  AFTER YOU LEAVE:   Medicines:   · Pain medicine: You may be given medicine to take away or decrease pain  Do not wait until the pain is severe before you take your medicine  · Antinausea medicine: This medicine may be given to calm your stomach and prevent vomiting  · Blood thinners  help prevent blood clots  Blood thinners may be given before, during, and after a surgery or procedure  Blood thinners make it more likely for you to bleed or bruise  · Take your medicine as directed  Call your healthcare provider if you think your medicine is not helping or if you have side effects  Tell him if you are allergic to any medicine  Keep a list of the medicines, vitamins, and herbs you take  Include the amounts, and when and why you take them  Bring the list or the pill bottles to follow-up visits  Carry your medicine list with you in case of an emergency  Follow up with your primary healthcare provider or gynecologist as directed:  Ask how to care for your wound  You may need blood tests, x-rays, or ultrasounds at your follow-up visits  Write down your questions so you remember to ask them during your visits  Limit activity until you have fully recovered from surgery:   · Ask when it is safe for you to drive, walk up stairs, lift heavy objects, and have sex  · Ask when it is okay to exercise, and what types of exercise to do  Start slowly and do more as you get stronger  Contact your primary healthcare provider or gynecologist if:   · You have heavy vaginal bleeding that fills 1 or more sanitary pads in 1 hour  · Your wound opens      · You have a fever, and your wound is Called mother, L/M providing her the phone number to the portal help line and advised her to call and they will be able to set her up as the proxy. red and swollen  · You have yellow, green, or bad-smelling discharge coming from your vagina  · You feel new pain or fullness in your vagina  · You have questions or concerns about your surgery, medicine, or care  Seek care immediately or call 911 if:   · You have new or more blood from your vagina or your wound  · Your arm or leg feels warm, tender, and painful  It may look swollen and red  · You suddenly feel lightheaded and have trouble breathing  · You have chest pain  You may have more pain when you take a deep breath or cough  You may cough up blood  © 2014 [address] is for End User's use only and may not be sold, redistributed or otherwise used for commercial purposes  All illustrations and images included in CareNotes® are the copyrighted property of [employer]  or [employer]es [employer]  The above information is an  only  It is not intended as medical advice for individual conditions or treatments  Talk to your doctor, nurse or pharmacist before following any medical regimen to see if it is safe and effective for you

## 2020-11-23 ENCOUNTER — HOSPITAL ENCOUNTER (EMERGENCY)
Facility: HOSPITAL | Age: 30
Discharge: HOME | End: 2020-11-23
Attending: EMERGENCY MEDICINE | Admitting: EMERGENCY MEDICINE
Payer: COMMERCIAL

## 2020-11-23 VITALS
WEIGHT: 231.92 LBS | RESPIRATION RATE: 19 BRPM | SYSTOLIC BLOOD PRESSURE: 124 MMHG | OXYGEN SATURATION: 97 % | BODY MASS INDEX: 37.43 KG/M2 | TEMPERATURE: 96.7 F | HEART RATE: 79 BPM | DIASTOLIC BLOOD PRESSURE: 72 MMHG

## 2020-11-23 DIAGNOSIS — F41.9 ANXIETY: Primary | ICD-10-CM

## 2020-11-23 DIAGNOSIS — F41.0 PANIC ATTACK: ICD-10-CM

## 2020-11-23 PROCEDURE — 99283 EMERGENCY DEPT VISIT LOW MDM: CPT

## 2020-11-23 PROCEDURE — 99284 EMERGENCY DEPT VISIT MOD MDM: CPT | Performed by: EMERGENCY MEDICINE

## 2020-11-23 RX ORDER — HYDROXYZINE HYDROCHLORIDE 25 MG/1
25 TABLET, FILM COATED ORAL EVERY 6 HOURS PRN
Qty: 20 TABLET | Refills: 0 | Status: SHIPPED | OUTPATIENT
Start: 2020-11-23 | End: 2021-05-26 | Stop reason: ALTCHOICE

## 2020-11-23 RX ORDER — HYDROXYZINE HYDROCHLORIDE 25 MG/1
25 TABLET, FILM COATED ORAL ONCE
Status: COMPLETED | OUTPATIENT
Start: 2020-11-23 | End: 2020-11-23

## 2020-11-23 RX ADMIN — HYDROXYZINE HYDROCHLORIDE 25 MG: 25 TABLET, FILM COATED ORAL at 07:32

## 2020-11-30 ENCOUNTER — NURSE TRIAGE (OUTPATIENT)
Dept: OTHER | Facility: OTHER | Age: 30
End: 2020-11-30

## 2020-11-30 DIAGNOSIS — U07.1 COVID-19: Primary | ICD-10-CM

## 2021-02-12 ENCOUNTER — HOSPITAL ENCOUNTER (EMERGENCY)
Facility: HOSPITAL | Age: 31
Discharge: HOME | End: 2021-02-12
Attending: EMERGENCY MEDICINE | Admitting: EMERGENCY MEDICINE
Payer: COMMERCIAL

## 2021-02-12 VITALS
WEIGHT: 237.3 LBS | DIASTOLIC BLOOD PRESSURE: 90 MMHG | OXYGEN SATURATION: 98 % | SYSTOLIC BLOOD PRESSURE: 137 MMHG | HEART RATE: 80 BPM | RESPIRATION RATE: 18 BRPM | BODY MASS INDEX: 38.3 KG/M2 | TEMPERATURE: 98.6 F

## 2021-02-12 DIAGNOSIS — R11.2 NON-INTRACTABLE VOMITING WITH NAUSEA, UNSPECIFIED VOMITING TYPE: Primary | ICD-10-CM

## 2021-02-12 PROCEDURE — 99284 EMERGENCY DEPT VISIT MOD MDM: CPT | Performed by: EMERGENCY MEDICINE

## 2021-02-12 PROCEDURE — 99283 EMERGENCY DEPT VISIT LOW MDM: CPT

## 2021-02-12 RX ORDER — ONDANSETRON 4 MG/1
4 TABLET, ORALLY DISINTEGRATING ORAL EVERY 6 HOURS PRN
Qty: 16 TABLET | Refills: 0 | Status: SHIPPED | OUTPATIENT
Start: 2021-02-12 | End: 2021-05-26 | Stop reason: ALTCHOICE

## 2021-02-12 RX ORDER — ONDANSETRON 4 MG/1
4 TABLET, ORALLY DISINTEGRATING ORAL ONCE
Status: COMPLETED | OUTPATIENT
Start: 2021-02-12 | End: 2021-02-12

## 2021-02-12 RX ADMIN — ONDANSETRON 4 MG: 4 TABLET, ORALLY DISINTEGRATING ORAL at 07:03

## 2021-02-12 NOTE — Clinical Note
Rupal Bedolla was seen and treated in our emergency department on 2/12/2021  Diagnosis:     Janene  may return to work on return date  She may return on this date: 02/13/2021         If you have any questions or concerns, please don't hesitate to call        Alicia Barnett MD    ______________________________           _______________          _______________  Hospital Representative                              Date                                Time

## 2021-02-12 NOTE — Clinical Note
Jodi Odell was seen and treated in our emergency department on 2/12/2021  Diagnosis:     Janene  may return to work on return date  She may return on this date: 02/13/2021         If you have any questions or concerns, please don't hesitate to call        Charu Molina MD    ______________________________           _______________          _______________  Hospital Representative                              Date                                Time

## 2021-02-16 NOTE — ED PROVIDER NOTES
History  Chief Complaint   Patient presents with    Nausea     pt  reporting nausea x 2 days, vomiting yesterday & diarrhea this AM     Patient is a 26-year-old female complaining of nausea and vomiting for 2 days  Numerous episodes of vomiting yesterday and diarrhea today  Inability to tolerate p o  No fevers sweats or chills  No questionable food exposure sick contacts or recent antibiotic use  No fevers sweats or chills  Patient did use some over-the-counter medication with mild relief  Prior to Admission Medications   Prescriptions Last Dose Informant Patient Reported? Taking?    Loratadine (CLARITIN PO) Not Taking at Unknown time Self Yes No   Sig: Take by mouth   cyclobenzaprine (FLEXERIL) 10 mg tablet Not Taking at Unknown time  No No   Sig: Take 1 tablet (10 mg total) by mouth 2 (two) times a day as needed for muscle spasms   Patient not taking: Reported on 11/23/2020   hydrOXYzine HCL (ATARAX) 25 mg tablet Not Taking at Unknown time  No No   Sig: Take 1 tablet (25 mg total) by mouth every 6 (six) hours as needed for anxiety   Patient not taking: Reported on 2/12/2021   naproxen (NAPROSYN) 500 mg tablet Not Taking at Unknown time  No No   Sig: Take 1 tablet (500 mg total) by mouth 2 (two) times a day with meals   Patient not taking: Reported on 11/23/2020      Facility-Administered Medications: None       Past Medical History:   Diagnosis Date    Anemia due to chronic blood loss     has had recent iron infusions    Anxiety     Asthma     Bipolar disorder (HCC)     Depression     Dry cough     pt states allergy related    Environmental and seasonal allergies     Fibroid     Fibroid uterus 1/14/2019    Food allergy     raisins    Varicella     Wears contact lenses        Past Surgical History:   Procedure Laterality Date    NO PAST SURGERIES      [place] REMOVAL OF FALLOPIAN TUBE Bilateral 1/31/2019    Procedure: SALPINGECTOMY;  Surgeon: Violette Nichole MD;  Location[facility]; Service: Gynecology    SD TOTAL ABDOM HYSTERECTOMY N/A 2019    Procedure: HYSTERECTOMY TOTAL ABDOMINAL (ANTONIO); Surgeon: Makayla Dixon MD;  [facility];  Service: Gynecology       Family History   Problem Relation Age of Onset    Colon cancer Father          age 37    Prostate cancer Father     Cirrhosis Father     Alcohol abuse Father     Sleep apnea Father     Cancer Maternal Grandmother     Other Mother         hysterectomy    No Known Problems Sister     No Known Problems Brother     Other Paternal Grandmother          during surgery    No Known Problems Brother     Breast cancer Neg Hx     Ovarian cancer Neg Hx      I have reviewed and agree with the history as documented  E-Cigarette/Vaping    E-Cigarette Use Never User      E-Cigarette/Vaping Substances    Nicotine No     THC No     CBD No     Flavoring No     Other No     Unknown No      Social History     Tobacco Use    Smoking status: Never Smoker    Smokeless tobacco: Never Used   Substance Use Topics    Alcohol use: Yes     Alcohol/week: 4 0 - 5 0 standard drinks     Types: 4 - 5 Cans of beer per week     Frequency: 2-4 times a month     Drinks per session: 3 or 4     Binge frequency: Never     Comment: socially    Drug use: Yes     Types: Marijuana     Comment: daily       Review of Systems   Constitutional: Negative  HENT: Negative  Eyes: Negative  Respiratory: Negative  Cardiovascular: Negative  Gastrointestinal: Positive for diarrhea, nausea and vomiting  Endocrine: Negative  Genitourinary: Negative  Musculoskeletal: Negative  Skin: Negative  Allergic/Immunologic: Negative  Neurological: Negative  Hematological: Negative  Psychiatric/Behavioral: Negative  All other systems reviewed and are negative  Physical Exam  Physical Exam  Vitals signs and nursing note reviewed  Constitutional:       Appearance: Normal appearance  She is normal weight     HENT: Head: Normocephalic and atraumatic  Nose: Nose normal       Mouth/Throat:      Mouth: Mucous membranes are moist       Pharynx: Oropharynx is clear  Neck:      Musculoskeletal: Normal range of motion and neck supple  Cardiovascular:      Rate and Rhythm: Normal rate and regular rhythm  Pulses: Normal pulses  Heart sounds: Normal heart sounds  Pulmonary:      Effort: Pulmonary effort is normal       Breath sounds: Normal breath sounds  Abdominal:      General: Bowel sounds are normal  There is no distension  Palpations: Abdomen is soft  There is no mass  Tenderness: There is no abdominal tenderness  There is no guarding or rebound  Musculoskeletal: Normal range of motion  Skin:     General: Skin is warm and dry  Capillary Refill: Capillary refill takes less than 2 seconds  Neurological:      General: No focal deficit present  Mental Status: She is alert and oriented to person, place, and time     Psychiatric:         Mood and Affect: Mood normal          Behavior: Behavior normal          Vital Signs  ED Triage Vitals [02/12/21 0645]   Temperature Pulse Respirations Blood Pressure SpO2   98 6 °F (37 °C) 80 18 137/90 98 %      Temp Source Heart Rate Source Patient Position - Orthostatic VS BP Location FiO2 (%)   Oral -- Sitting Left arm --      Pain Score       --           Vitals:    02/12/21 0645   BP: 137/90   Pulse: 80   Patient Position - Orthostatic VS: Sitting         Visual Acuity      ED Medications  Medications   ondansetron (ZOFRAN-ODT) dispersible tablet 4 mg (4 mg Oral Given 2/12/21 0703)       Diagnostic Studies  Results Reviewed     None                 No orders to display              Procedures  Procedures         ED Course                                           MDM  Number of Diagnoses or Management Options  Non-intractable vomiting with nausea, unspecified vomiting type:       Disposition  Final diagnoses:   Non-intractable vomiting with nausea, unspecified vomiting type     Time reflects when diagnosis was documented in both MDM as applicable and the Disposition within this note     Time User Action Codes Description Comment    2/12/2021  6:51 AM Pao Villa Add [R11 0] Nausea     2/12/2021  6:51 AM Laila Sanchez Comment Add [R11 2] Non-intractable vomiting with nausea, unspecified vomiting type     2/12/2021  6:51 AM Pao Villa Modify [R11 2] Non-intractable vomiting with nausea, unspecified vomiting type     2/12/2021  6:51 AM Pao Villa Remove [R11 0] Nausea       ED Disposition     ED Disposition Condition Date/Time Comment    Discharge Stable Fri Feb 12, 2021  6:51 AM Selwyn Paz discharge to home/self care  Follow-up Information     Follow up With Specialties Details Why Sheriayaan 95, DO Family Medicine   [facility] [address]  [phone]            Discharge Medication List as of 2/12/2021  6:55 AM      START taking these medications    Details   ondansetron (ZOFRAN-ODT) 4 mg disintegrating tablet Take 1 tablet (4 mg total) by mouth every 6 (six) hours as needed for nausea or vomiting, Starting Fri 2/12/2021, Normal         CONTINUE these medications which have NOT CHANGED    Details   cyclobenzaprine (FLEXERIL) 10 mg tablet Take 1 tablet (10 mg total) by mouth 2 (two) times a day as needed for muscle spasms, Starting Tue 10/29/2019, Print      hydrOXYzine HCL (ATARAX) 25 mg tablet Take 1 tablet (25 mg total) by mouth every 6 (six) hours as needed for anxiety, Starting Mon 11/23/2020, Print      Loratadine (CLARITIN PO) Take by mouth, Historical Med      naproxen (NAPROSYN) 500 mg tablet Take 1 tablet (500 mg total) by mouth 2 (two) times a day with meals, Starting Tue 10/29/2019, Print           No discharge procedures on file      PDMP Review     None          ED Provider  Electronically Signed by           Merrill Parsons MD  02/15/21 2434

## 2021-05-26 ENCOUNTER — HOSPITAL ENCOUNTER (EMERGENCY)
Facility: HOSPITAL | Age: 31
Discharge: HOME | End: 2021-05-26
Attending: EMERGENCY MEDICINE | Admitting: EMERGENCY MEDICINE
Payer: COMMERCIAL

## 2021-05-26 ENCOUNTER — APPOINTMENT (EMERGENCY)
Dept: RADIOLOGY | Facility: HOSPITAL | Age: 31
End: 2021-05-26
Payer: COMMERCIAL

## 2021-05-26 VITALS
BODY MASS INDEX: 38.11 KG/M2 | RESPIRATION RATE: 20 BRPM | HEART RATE: 97 BPM | DIASTOLIC BLOOD PRESSURE: 109 MMHG | TEMPERATURE: 98.3 F | WEIGHT: 236.11 LBS | OXYGEN SATURATION: 100 % | SYSTOLIC BLOOD PRESSURE: 159 MMHG

## 2021-05-26 DIAGNOSIS — M25.511 RIGHT SHOULDER PAIN: ICD-10-CM

## 2021-05-26 DIAGNOSIS — M62.838 MUSCLE SPASM: Primary | ICD-10-CM

## 2021-05-26 LAB
EXT PREG TEST URINE: NEGATIVE
EXT. CONTROL ED NAV: NORMAL

## 2021-05-26 PROCEDURE — 73030 X-RAY EXAM OF SHOULDER: CPT

## 2021-05-26 PROCEDURE — 81025 URINE PREGNANCY TEST: CPT | Performed by: EMERGENCY MEDICINE

## 2021-05-26 PROCEDURE — 96372 THER/PROPH/DIAG INJ SC/IM: CPT

## 2021-05-26 PROCEDURE — 99284 EMERGENCY DEPT VISIT MOD MDM: CPT

## 2021-05-26 PROCEDURE — 99284 EMERGENCY DEPT VISIT MOD MDM: CPT | Performed by: EMERGENCY MEDICINE

## 2021-05-26 RX ORDER — LIDOCAINE 50 MG/G
1 PATCH TOPICAL ONCE
Status: DISCONTINUED | OUTPATIENT
Start: 2021-05-26 | End: 2021-05-26 | Stop reason: HOSPADM

## 2021-05-26 RX ORDER — CYCLOBENZAPRINE HCL 10 MG
10 TABLET ORAL 2 TIMES DAILY PRN
Qty: 20 TABLET | Refills: 0 | Status: SHIPPED | OUTPATIENT
Start: 2021-05-26

## 2021-05-26 RX ORDER — LIDOCAINE 50 MG/G
1 PATCH TOPICAL DAILY
Qty: 6 PATCH | Refills: 0 | Status: SHIPPED | OUTPATIENT
Start: 2021-05-26 | End: 2021-05-26 | Stop reason: SDUPTHER

## 2021-05-26 RX ORDER — ACETAMINOPHEN 325 MG/1
975 TABLET ORAL ONCE
Status: COMPLETED | OUTPATIENT
Start: 2021-05-26 | End: 2021-05-26

## 2021-05-26 RX ORDER — LIDOCAINE 50 MG/G
1 PATCH TOPICAL DAILY
Qty: 6 PATCH | Refills: 0 | Status: SHIPPED | OUTPATIENT
Start: 2021-05-26

## 2021-05-26 RX ORDER — KETOROLAC TROMETHAMINE 30 MG/ML
15 INJECTION, SOLUTION INTRAMUSCULAR; INTRAVENOUS ONCE
Status: COMPLETED | OUTPATIENT
Start: 2021-05-26 | End: 2021-05-26

## 2021-05-26 RX ORDER — NAPROXEN 500 MG/1
500 TABLET ORAL 2 TIMES DAILY WITH MEALS
Qty: 30 TABLET | Refills: 0 | Status: SHIPPED | OUTPATIENT
Start: 2021-05-26

## 2021-05-26 RX ORDER — CYCLOBENZAPRINE HCL 10 MG
10 TABLET ORAL 2 TIMES DAILY PRN
Qty: 20 TABLET | Refills: 0 | Status: SHIPPED | OUTPATIENT
Start: 2021-05-26 | End: 2021-05-26 | Stop reason: SDUPTHER

## 2021-05-26 RX ORDER — CYCLOBENZAPRINE HCL 10 MG
10 TABLET ORAL ONCE
Status: COMPLETED | OUTPATIENT
Start: 2021-05-26 | End: 2021-05-26

## 2021-05-26 RX ORDER — NAPROXEN 500 MG/1
500 TABLET ORAL 2 TIMES DAILY WITH MEALS
Qty: 30 TABLET | Refills: 0 | Status: SHIPPED | OUTPATIENT
Start: 2021-05-26 | End: 2021-05-26 | Stop reason: SDUPTHER

## 2021-05-26 RX ADMIN — ACETAMINOPHEN 975 MG: 325 TABLET ORAL at 13:06

## 2021-05-26 RX ADMIN — LIDOCAINE 1 PATCH: 50 PATCH TOPICAL at 13:09

## 2021-05-26 RX ADMIN — CYCLOBENZAPRINE HYDROCHLORIDE 10 MG: 10 TABLET, FILM COATED ORAL at 13:08

## 2021-05-26 RX ADMIN — KETOROLAC TROMETHAMINE 15 MG: 30 INJECTION, SOLUTION INTRAMUSCULAR; INTRAVENOUS at 13:09

## 2021-05-26 NOTE — Clinical Note
Teja Kern was seen and treated in our emergency department on 5/26/2021  Diagnosis:     Janene    She may return on this date: 05/29/2021         If you have any questions or concerns, please don't hesitate to call        Telma Shaw DO    ______________________________           _______________          _______________  Hospital Representative                              Date                                Time

## 2021-05-26 NOTE — ED PROVIDER NOTES
History  Chief Complaint   Patient presents with    Arm Injury     pt was moving a friend this morning "who was passed out from alcohol poisoning, now my arm is killing me  unable to move the R arm     Patient is a 10-year-old female presenting for concerns of right shoulder pain  Patient notes that she was feeling well until this morning  She notes that her girlfriend was out drinking to mild excess which caused her to need to be help carried up the stairs her house  She states that she had no falls or significant trauma but did feel like she was straining to help pull the dead weight of her girlfriend up the stairs  She states several hours afterwards she began to feel spasm increasing pain  She states she has taken no Tylenol or Motrin prior to arrival   Denies chest pain or radiation of her pain  She states she gets intermittent paresthesias down the right arm  Denies any previous injury or trauma to the right shoulder  None       Past Medical History:   Diagnosis Date    Anemia due to chronic blood loss     has had recent iron infusions    Anxiety     Asthma     Bipolar disorder (HCC)     Depression     Dry cough     pt states allergy related    Environmental and seasonal allergies     Fibroid     Fibroid uterus 2019    Food allergy     raisins    Varicella     Wears contact lenses        Past Surgical History:   Procedure Laterality Date    NO PAST SURGERIES      HI REMOVAL OF FALLOPIAN TUBE Bilateral 2019    Procedure: SALPINGECTOMY;  Surgeon: Ja Mckinney MD;  [facility];  Service: Gynecology    HI TOTAL ABDOM HYSTERECTOMY N/A 2019    Procedure: HYSTERECTOMY TOTAL ABDOMINAL (ANTONIO);   Surgeon: Ja Mckinney MD;  [facility];  Service: Gynecology       Family History   Problem Relation Age of Onset    Colon cancer Father          age 40    Prostate cancer Father     Cirrhosis Father     Alcohol abuse Father     Sleep apnea Father  Cancer Maternal Grandmother     Other Mother         hysterectomy    No Known Problems Sister     No Known Problems Brother     Other Paternal Grandmother          during surgery    No Known Problems Brother     Breast cancer Neg Hx     Ovarian cancer Neg Hx      I have reviewed and agree with the history as documented  E-Cigarette/Vaping    E-Cigarette Use Never User      E-Cigarette/Vaping Substances    Nicotine No     THC No     CBD No     Flavoring No     Other No     Unknown No      Social History     Tobacco Use    Smoking status: Never Smoker    Smokeless tobacco: Never Used   Substance Use Topics    Alcohol use: Yes     Alcohol/week: 4 0 - 5 0 standard drinks     Types: 4 - 5 Cans of beer per week     Frequency: 2-4 times a month     Drinks per session: 3 or 4     Binge frequency: Never     Comment: socially    Drug use: Yes     Types: Marijuana     Comment: daily       Review of Systems   Constitutional: Negative  Negative for chills and fever  HENT: Negative  Negative for rhinorrhea, sore throat, trouble swallowing and voice change  Eyes: Negative  Negative for pain and visual disturbance  Respiratory: Negative  Negative for cough, shortness of breath and wheezing  Cardiovascular: Negative  Negative for chest pain and palpitations  Gastrointestinal: Negative for abdominal pain, diarrhea, nausea and vomiting  Genitourinary: Negative  Negative for dysuria and frequency  Musculoskeletal: Positive for arthralgias  Negative for neck pain and neck stiffness  Skin: Negative  Negative for rash  Neurological: Negative  Negative for dizziness, speech difficulty, weakness, light-headedness and numbness  Physical Exam  Physical Exam  Vitals signs and nursing note reviewed  Constitutional:       General: She is not in acute distress  Appearance: She is well-developed  HENT:      Head: Normocephalic and atraumatic     Eyes:      Conjunctiva/sclera: Conjunctivae normal       Pupils: Pupils are equal, round, and reactive to light  Neck:      Musculoskeletal: Normal range of motion and neck supple  Trachea: No tracheal deviation  Cardiovascular:      Rate and Rhythm: Normal rate and regular rhythm  Pulmonary:      Effort: Pulmonary effort is normal  No respiratory distress  Breath sounds: Normal breath sounds  No wheezing or rales  Abdominal:      General: Bowel sounds are normal  There is no distension  Palpations: Abdomen is soft  Tenderness: There is no abdominal tenderness  There is no guarding or rebound  Musculoskeletal: Normal range of motion  General: No deformity  Right shoulder: She exhibits tenderness, pain and spasm  She exhibits no bony tenderness, no swelling, no effusion, no crepitus, no deformity, no laceration, normal pulse and normal strength  Skin:     General: Skin is warm and dry  Capillary Refill: Capillary refill takes less than 2 seconds  Findings: No rash  Neurological:      Mental Status: She is alert and oriented to person, place, and time     Psychiatric:         Behavior: Behavior normal          Vital Signs  ED Triage Vitals [05/26/21 1236]   Temperature Pulse Respirations Blood Pressure SpO2   98 3 °F (36 8 °C) 97 20 (!) 159/109 100 %      Temp Source Heart Rate Source Patient Position - Orthostatic VS BP Location FiO2 (%)   Tympanic Monitor Sitting Left arm --      Pain Score       Worst Possible Pain           Vitals:    05/26/21 1236   BP: (!) 159/109   Pulse: 97   Patient Position - Orthostatic VS: Sitting         Visual Acuity      ED Medications  Medications   lidocaine (LIDODERM) 5 % patch 1 patch (1 patch Topical Medication Applied 5/26/21 1309)   ketorolac (TORADOL) injection 15 mg (15 mg Intramuscular Given 5/26/21 1309)   acetaminophen (TYLENOL) tablet 975 mg (975 mg Oral Given 5/26/21 1306)   cyclobenzaprine (FLEXERIL) tablet 10 mg (10 mg Oral Given 5/26/21 1308) Diagnostic Studies  Results Reviewed     Procedure Component Value Units Date/Time    POCT pregnancy, urine [[id number]]  (Normal) Resulted: 05/26/21 1306    Lab Status: Final result Updated: 05/26/21 1308     EXT PREG TEST UR (Ref: Negative) negative     Control valid                 XR shoulder 2+ views RIGHT   ED Interpretation by Antoine Wu DO (05/26 1354)   No acute fx or dislocation appreciated  Procedures  Procedures         ED Course                             SBIRT 22yo+      Most Recent Value   SBIRT (24 yo +)   In order to provide better care to our patients, we are screening all of our patients for alcohol and drug use  Would it be okay to ask you these screening questions? No Filed at: 05/26/2021 1241                    MDM  Number of Diagnoses or Management Options  Muscle spasm:   Right shoulder pain:   Diagnosis management comments: 56-year-old female presenting for concerns of atraumatic right shoulder pain  Exam consistent with muscle spasm  Pain improved after nonnarcotic analgesia in the emergency room  Patient is comfortable in the sling  Advised for continued management for analgesia at home the need for follow-up care and strict return precautions were discussed  X-ray reviewed by me without acute injury         Amount and/or Complexity of Data Reviewed  Clinical lab tests: ordered and reviewed  Tests in the radiology section of CPT®: ordered and reviewed        Disposition  Final diagnoses:   Muscle spasm   Right shoulder pain     Time reflects when diagnosis was documented in both MDM as applicable and the Disposition within this note     Time User Action Codes Description Comment    5/26/2021  1:54 PM Guy Loco Add [A79 450] Muscle spasm     5/26/2021  1:54 PM Guy Loco Add [J16 678] Right shoulder pain       ED Disposition     ED Disposition Condition Date/Time Comment    Discharge Stable Wed May 26, 2021  1:54 PM Lamont Height discharge to home/self care  Follow-up Information     Follow up With Specialties Details Why Contact Info Additional Information    Icard , DO Family Medicine   [address] [address]  [phone]       [address] [facility]   [address]  [place] [address] [facility] [address] [phone] [address] [facility], [address], [address] [place], [place], [address] [phone]          Patient's Medications   Discharge Prescriptions    CYCLOBENZAPRINE (FLEXERIL) 10 MG TABLET    Take 1 tablet (10 mg total) by mouth 2 (two) times a day as needed for muscle spasms       Start Date: 2021 End Date: --       Order Dose: 10 mg       Quantity: 20 tablet    Refills: 0    LIDOCAINE (LIDODERM) 5 %    Apply 1 patch topically daily Remove & Discard patch within 12 hours or as directed by MD       Start Date: 2021 End Date: --       Order Dose: 1 patch       Quantity: 6 patch    Refills: 0    NAPROXEN (NAPROSYN) 500 MG TABLET    Take 1 tablet (500 mg total) by mouth 2 (two) times a day with meals       Start Date: 2021 End Date: --       Order Dose: 500 mg       Quantity: 30 tablet    Refills: 0     No discharge procedures on file      PDMP Review     None          ED Provider  Electronically Signed by           Kerry Gore DO  21 3003

## 2021-11-16 ENCOUNTER — HOSPITAL ENCOUNTER (EMERGENCY)
Facility: HOSPITAL | Age: 31
Discharge: HOME | End: 2021-11-16
Attending: EMERGENCY MEDICINE | Admitting: EMERGENCY MEDICINE
Payer: COMMERCIAL

## 2021-11-16 ENCOUNTER — APPOINTMENT (EMERGENCY)
Dept: CT IMAGING | Facility: HOSPITAL | Age: 31
End: 2021-11-16
Payer: COMMERCIAL

## 2021-11-16 VITALS
HEART RATE: 67 BPM | WEIGHT: 226.19 LBS | RESPIRATION RATE: 18 BRPM | HEIGHT: 66 IN | BODY MASS INDEX: 36.35 KG/M2 | DIASTOLIC BLOOD PRESSURE: 102 MMHG | TEMPERATURE: 98.5 F | OXYGEN SATURATION: 99 % | SYSTOLIC BLOOD PRESSURE: 151 MMHG

## 2021-11-16 DIAGNOSIS — R11.0 NAUSEA: ICD-10-CM

## 2021-11-16 DIAGNOSIS — R10.9 ABDOMINAL PAIN: Primary | ICD-10-CM

## 2021-11-16 DIAGNOSIS — K59.00 CONSTIPATION: ICD-10-CM

## 2021-11-16 LAB
ALBUMIN SERPL BCP-MCNC: 3.2 G/DL (ref 3.5–5)
ALP SERPL-CCNC: 74 U/L (ref 46–116)
ALT SERPL W P-5'-P-CCNC: 23 U/L (ref 12–78)
ANION GAP SERPL CALCULATED.3IONS-SCNC: 9 MMOL/L (ref 4–13)
AST SERPL W P-5'-P-CCNC: 13 U/L (ref 5–45)
BACTERIA UR QL AUTO: NORMAL /HPF
BASOPHILS # BLD AUTO: 0.02 THOUSANDS/ΜL (ref 0–0.1)
BASOPHILS NFR BLD AUTO: 0 % (ref 0–1)
BILIRUB SERPL-MCNC: 0.6 MG/DL (ref 0.2–1)
BILIRUB UR QL STRIP: NEGATIVE
BUN SERPL-MCNC: 8 MG/DL (ref 5–25)
CALCIUM ALBUM COR SERPL-MCNC: 9.2 MG/DL (ref 8.3–10.1)
CALCIUM SERPL-MCNC: 8.6 MG/DL (ref 8.3–10.1)
CHLORIDE SERPL-SCNC: 105 MMOL/L (ref 100–108)
CLARITY UR: CLEAR
CO2 SERPL-SCNC: 28 MMOL/L (ref 21–32)
COLOR UR: YELLOW
CREAT SERPL-MCNC: 0.83 MG/DL (ref 0.6–1.3)
EOSINOPHIL # BLD AUTO: 0.13 THOUSAND/ΜL (ref 0–0.61)
EOSINOPHIL NFR BLD AUTO: 2 % (ref 0–6)
ERYTHROCYTE [DISTWIDTH] IN BLOOD BY AUTOMATED COUNT: 13.2 % (ref 11.6–15.1)
EXT PREG TEST URINE: NEGATIVE
EXT. CONTROL ED NAV: NORMAL
GFR SERPL CREATININE-BSD FRML MDRD: 107 ML/MIN/1.73SQ M
GLUCOSE SERPL-MCNC: 94 MG/DL (ref 65–140)
GLUCOSE UR STRIP-MCNC: NEGATIVE MG/DL
HCT VFR BLD AUTO: 45.2 % (ref 34.8–46.1)
HGB BLD-MCNC: 14.9 G/DL (ref 11.5–15.4)
HGB UR QL STRIP.AUTO: ABNORMAL
IMM GRANULOCYTES # BLD AUTO: 0.02 THOUSAND/UL (ref 0–0.2)
IMM GRANULOCYTES NFR BLD AUTO: 0 % (ref 0–2)
KETONES UR STRIP-MCNC: NEGATIVE MG/DL
LEUKOCYTE ESTERASE UR QL STRIP: NEGATIVE
LIPASE SERPL-CCNC: 39 U/L (ref 73–393)
LYMPHOCYTES # BLD AUTO: 2.02 THOUSANDS/ΜL (ref 0.6–4.47)
LYMPHOCYTES NFR BLD AUTO: 32 % (ref 14–44)
MCH RBC QN AUTO: 27.8 PG (ref 26.8–34.3)
MCHC RBC AUTO-ENTMCNC: 33 G/DL (ref 31.4–37.4)
MCV RBC AUTO: 84 FL (ref 82–98)
MONOCYTES # BLD AUTO: 0.68 THOUSAND/ΜL (ref 0.17–1.22)
MONOCYTES NFR BLD AUTO: 11 % (ref 4–12)
NEUTROPHILS # BLD AUTO: 3.48 THOUSANDS/ΜL (ref 1.85–7.62)
NEUTS SEG NFR BLD AUTO: 55 % (ref 43–75)
NITRITE UR QL STRIP: NEGATIVE
NON-SQ EPI CELLS URNS QL MICRO: NORMAL /HPF
NRBC BLD AUTO-RTO: 0 /100 WBCS
PH UR STRIP.AUTO: 7 [PH] (ref 4.5–8)
PLATELET # BLD AUTO: 456 THOUSANDS/UL (ref 149–390)
PMV BLD AUTO: 8.8 FL (ref 8.9–12.7)
POTASSIUM SERPL-SCNC: 3.3 MMOL/L (ref 3.5–5.3)
PROT SERPL-MCNC: 7 G/DL (ref 6.4–8.2)
PROT UR STRIP-MCNC: NEGATIVE MG/DL
RBC # BLD AUTO: 5.36 MILLION/UL (ref 3.81–5.12)
RBC #/AREA URNS AUTO: NORMAL /HPF
SODIUM SERPL-SCNC: 142 MMOL/L (ref 136–145)
SP GR UR STRIP.AUTO: 1.01 (ref 1–1.03)
UROBILINOGEN UR QL STRIP.AUTO: 0.2 E.U./DL
WBC # BLD AUTO: 6.35 THOUSAND/UL (ref 4.31–10.16)
WBC #/AREA URNS AUTO: NORMAL /HPF

## 2021-11-16 PROCEDURE — 74177 CT ABD & PELVIS W/CONTRAST: CPT

## 2021-11-16 PROCEDURE — 96361 HYDRATE IV INFUSION ADD-ON: CPT

## 2021-11-16 PROCEDURE — 83690 ASSAY OF LIPASE: CPT

## 2021-11-16 PROCEDURE — 99284 EMERGENCY DEPT VISIT MOD MDM: CPT | Performed by: EMERGENCY MEDICINE

## 2021-11-16 PROCEDURE — 36415 COLL VENOUS BLD VENIPUNCTURE: CPT

## 2021-11-16 PROCEDURE — 80053 COMPREHEN METABOLIC PANEL: CPT

## 2021-11-16 PROCEDURE — 81025 URINE PREGNANCY TEST: CPT

## 2021-11-16 PROCEDURE — 96375 TX/PRO/DX INJ NEW DRUG ADDON: CPT

## 2021-11-16 PROCEDURE — 85025 COMPLETE CBC W/AUTO DIFF WBC: CPT

## 2021-11-16 PROCEDURE — 81001 URINALYSIS AUTO W/SCOPE: CPT

## 2021-11-16 PROCEDURE — 99284 EMERGENCY DEPT VISIT MOD MDM: CPT

## 2021-11-16 PROCEDURE — 96374 THER/PROPH/DIAG INJ IV PUSH: CPT

## 2021-11-16 PROCEDURE — G1004 CDSM NDSC: HCPCS

## 2021-11-16 RX ORDER — POTASSIUM CHLORIDE 20 MEQ/1
20 TABLET, EXTENDED RELEASE ORAL ONCE
Status: COMPLETED | OUTPATIENT
Start: 2021-11-16 | End: 2021-11-16

## 2021-11-16 RX ORDER — ONDANSETRON 2 MG/ML
4 INJECTION INTRAMUSCULAR; INTRAVENOUS ONCE
Status: COMPLETED | OUTPATIENT
Start: 2021-11-16 | End: 2021-11-16

## 2021-11-16 RX ORDER — ONDANSETRON 4 MG/1
4 TABLET, ORALLY DISINTEGRATING ORAL EVERY 6 HOURS PRN
Qty: 10 TABLET | Refills: 0 | Status: SHIPPED | OUTPATIENT
Start: 2021-11-16

## 2021-11-16 RX ORDER — FAMOTIDINE 20 MG/1
20 TABLET, FILM COATED ORAL 2 TIMES DAILY
Qty: 28 TABLET | Refills: 0 | Status: SHIPPED | OUTPATIENT
Start: 2021-11-16 | End: 2021-11-30

## 2021-11-16 RX ORDER — ACETAMINOPHEN 325 MG/1
975 TABLET ORAL ONCE
Status: DISCONTINUED | OUTPATIENT
Start: 2021-11-16 | End: 2021-11-16

## 2021-11-16 RX ADMIN — ONDANSETRON 4 MG: 2 INJECTION INTRAMUSCULAR; INTRAVENOUS at 07:14

## 2021-11-16 RX ADMIN — IOHEXOL 100 ML: 350 INJECTION, SOLUTION INTRAVENOUS at 08:45

## 2021-11-16 RX ADMIN — SODIUM CHLORIDE 1000 ML: 0.9 INJECTION, SOLUTION INTRAVENOUS at 07:04

## 2021-11-16 RX ADMIN — FAMOTIDINE 20 MG: 10 INJECTION INTRAVENOUS at 07:14

## 2021-11-16 RX ADMIN — POTASSIUM CHLORIDE 20 MEQ: 1500 TABLET, EXTENDED RELEASE ORAL at 08:07

## 2022-03-25 ENCOUNTER — APPOINTMENT (EMERGENCY)
Dept: RADIOLOGY | Facility: HOSPITAL | Age: 32
End: 2022-03-25
Payer: MEDICARE

## 2022-03-25 ENCOUNTER — HOSPITAL ENCOUNTER (EMERGENCY)
Facility: HOSPITAL | Age: 32
Discharge: HOME | End: 2022-03-25
Attending: EMERGENCY MEDICINE
Payer: MEDICARE

## 2022-03-25 VITALS
RESPIRATION RATE: 18 BRPM | SYSTOLIC BLOOD PRESSURE: 142 MMHG | DIASTOLIC BLOOD PRESSURE: 87 MMHG | HEART RATE: 91 BPM | OXYGEN SATURATION: 100 % | TEMPERATURE: 97.8 F | WEIGHT: 228 LBS | BODY MASS INDEX: 36.8 KG/M2

## 2022-03-25 DIAGNOSIS — M54.9 CHRONIC BACK PAIN: Primary | ICD-10-CM

## 2022-03-25 DIAGNOSIS — G89.29 CHRONIC BACK PAIN: Primary | ICD-10-CM

## 2022-03-25 PROCEDURE — 72100 X-RAY EXAM L-S SPINE 2/3 VWS: CPT

## 2022-03-25 PROCEDURE — 72072 X-RAY EXAM THORAC SPINE 3VWS: CPT

## 2022-03-25 PROCEDURE — 99283 EMERGENCY DEPT VISIT LOW MDM: CPT

## 2022-03-25 PROCEDURE — 99284 EMERGENCY DEPT VISIT MOD MDM: CPT | Performed by: PHYSICIAN ASSISTANT

## 2022-03-25 PROCEDURE — 96372 THER/PROPH/DIAG INJ SC/IM: CPT

## 2022-03-25 PROCEDURE — 72040 X-RAY EXAM NECK SPINE 2-3 VW: CPT

## 2022-03-25 RX ORDER — KETOROLAC TROMETHAMINE 30 MG/ML
30 INJECTION, SOLUTION INTRAMUSCULAR; INTRAVENOUS ONCE
Status: COMPLETED | OUTPATIENT
Start: 2022-03-25 | End: 2022-03-25

## 2022-03-25 RX ORDER — PREDNISONE 10 MG/1
TABLET ORAL
Qty: 20 TABLET | Refills: 0 | Status: SHIPPED | OUTPATIENT
Start: 2022-03-25

## 2022-03-25 RX ORDER — LIDOCAINE 50 MG/G
1 PATCH TOPICAL DAILY
Qty: 7 PATCH | Refills: 0 | Status: SHIPPED | OUTPATIENT
Start: 2022-03-25

## 2022-03-25 RX ORDER — METHOCARBAMOL 500 MG/1
500 TABLET, FILM COATED ORAL
Qty: 10 TABLET | Refills: 0 | Status: SHIPPED | OUTPATIENT
Start: 2022-03-25

## 2022-03-25 RX ADMIN — KETOROLAC TROMETHAMINE 30 MG: 30 INJECTION, SOLUTION INTRAMUSCULAR; INTRAVENOUS at 10:35

## 2022-03-25 NOTE — ED PROVIDER NOTES
History  Chief Complaint   Patient presents with    Back Pain     non traumatic back pain "all over" x3 weeks     Pt with a month of total back pain has seen family doctor has PT appointment scheduled       Back Pain  Location:  Generalized  Quality:  Cramping  Pain severity:  Moderate  Onset quality:  Gradual  Duration:  1 month  Timing:  Constant  Progression:  Unchanged  Chronicity:  New  Context: not emotional stress    Relieved by:  Nothing  Worsened by:  Nothing  Ineffective treatments:  None tried  Associated symptoms: no abdominal pain        Prior to Admission Medications   Prescriptions Last Dose Informant Patient Reported? Taking?    cyclobenzaprine (FLEXERIL) 10 mg tablet Not Taking at Unknown time  No No   Sig: Take 1 tablet (10 mg total) by mouth 2 (two) times a day as needed for muscle spasms   Patient not taking: Reported on 11/16/2021    famotidine (PEPCID) 20 mg tablet   No No   Sig: Take 1 tablet (20 mg total) by mouth 2 (two) times a day for 14 days   lidocaine (LIDODERM) 5 % Not Taking at Unknown time  No No   Sig: Apply 1 patch topically daily Remove & Discard patch within 12 hours or as directed by MD   Patient not taking: Reported on 11/16/2021    naproxen (NAPROSYN) 500 mg tablet Not Taking at Unknown time  No No   Sig: Take 1 tablet (500 mg total) by mouth 2 (two) times a day with meals   Patient not taking: Reported on 11/16/2021    ondansetron (Zofran ODT) 4 mg disintegrating tablet Not Taking at Unknown time  No No   Sig: Take 1 tablet (4 mg total) by mouth every 6 (six) hours as needed for nausea or vomiting for up to 10 doses   Patient not taking: Reported on 3/25/2022       Facility-Administered Medications: None       Past Medical History:   Diagnosis Date    Anemia due to chronic blood loss     has had recent iron infusions    Anxiety     Asthma     Bipolar disorder (HCC)     Depression     Dry cough     pt states allergy related    Environmental and seasonal allergies     Fibroid     Fibroid uterus 2019    Food allergy     raisins    Varicella     Wears contact lenses        Past Surgical History:   Procedure Laterality Date    NO PAST SURGERIES      ID REMOVAL OF FALLOPIAN TUBE Bilateral 2019    Procedure: SALPINGECTOMY;  Surgeon: Jazmyn Mcdermott MD;  [facility];  Service: Gynecology    ID TOTAL ABDOM HYSTERECTOMY N/A 2019    Procedure: HYSTERECTOMY TOTAL ABDOMINAL (ANTONIO); Surgeon: Jazmyn Mcdermott MD;  [facility];  Service: Gynecology       Family History   Problem Relation Age of Onset    Colon cancer Father          age 37    Prostate cancer Father     Cirrhosis Father     Alcohol abuse Father     Sleep apnea Father     Cancer Maternal Grandmother     Other Mother         hysterectomy    No Known Problems Sister     No Known Problems Brother     Other Paternal Grandmother          during surgery    No Known Problems Brother     Breast cancer Neg Hx     Ovarian cancer Neg Hx      I have reviewed and agree with the history as documented  E-Cigarette/Vaping    E-Cigarette Use Never User      E-Cigarette/Vaping Substances    Nicotine No     THC No     CBD No     Flavoring No     Other No     Unknown No      Social History     Tobacco Use    Smoking status: Never Smoker    Smokeless tobacco: Never Used   Vaping Use    Vaping Use: Never used   Substance Use Topics    Alcohol use: Yes     Alcohol/week: 4 0 - 5 0 standard drinks     Types: 4 - 5 Cans of beer per week     Comment: socially    Drug use: Yes     Types: Marijuana     Comment: daily       Review of Systems   Constitutional: Negative  HENT: Negative  Eyes: Negative  Respiratory: Negative  Cardiovascular: Negative  Gastrointestinal: Negative  Negative for abdominal pain  Endocrine: Negative  Genitourinary: Negative  Musculoskeletal: Positive for back pain  Skin: Negative  Allergic/Immunologic: Negative      Neurological: Negative  Hematological: Negative  Psychiatric/Behavioral: Negative  All other systems reviewed and are negative  Physical Exam  Physical Exam  Vitals and nursing note reviewed  Constitutional:       Appearance: Normal appearance  She is normal weight  HENT:      Head: Normocephalic and atraumatic  Right Ear: Tympanic membrane, ear canal and external ear normal       Left Ear: Tympanic membrane, ear canal and external ear normal       Nose: Nose normal       Mouth/Throat:      Mouth: Mucous membranes are moist       Pharynx: Oropharynx is clear  Eyes:      Extraocular Movements: Extraocular movements intact  Conjunctiva/sclera: Conjunctivae normal       Pupils: Pupils are equal, round, and reactive to light  Neck:      Comments: Left trapezius tenderness  Mild midline tender   Cardiovascular:      Rate and Rhythm: Normal rate and regular rhythm  Pulses: Normal pulses  Heart sounds: Normal heart sounds  Pulmonary:      Effort: Pulmonary effort is normal       Breath sounds: Normal breath sounds  Abdominal:      General: Abdomen is flat  Bowel sounds are normal    Musculoskeletal:      Cervical back: Normal range of motion and neck supple  Comments: Lower thoracic and lumbar tender  Right paraspinal tenderness right sacroiliac and right sciatic notch pain dtr wnl great toe ext strong bilat no paresthesias    Skin:     General: Skin is warm  Capillary Refill: Capillary refill takes less than 2 seconds  Neurological:      General: No focal deficit present  Mental Status: She is alert and oriented to person, place, and time     Psychiatric:         Mood and Affect: Mood normal          Behavior: Behavior normal          Vital Signs  ED Triage Vitals   Temperature Pulse Respirations Blood Pressure SpO2   03/25/22 1015 03/25/22 1015 03/25/22 1015 03/25/22 1015 03/25/22 1015   97 8 °F (36 6 °C) 85 18 143/100 98 %      Temp Source Heart Rate Source Patient Position - Orthostatic VS BP Location FiO2 (%)   03/25/22 1015 03/25/22 1015 03/25/22 1015 03/25/22 1015 --   Oral Monitor Sitting Left arm       Pain Score       03/25/22 1035       10 - Worst Possible Pain           Vitals:    03/25/22 1015 03/25/22 1126   BP: 143/100 142/87   Pulse: 85 91   Patient Position - Orthostatic VS: Sitting          Visual Acuity      ED Medications  Medications   ketorolac (TORADOL) injection 30 mg (30 mg Intramuscular Given 3/25/22 1035)       Diagnostic Studies  Results Reviewed     None                 XR spine cervical 2 or 3 vw injury    (Results Pending)   XR spine thoracic 3 views    (Results Pending)   XR spine lumbar 2 or 3 views injury    (Results Pending)              Procedures  Procedures         ED Course                               SBIRT 22yo+      Most Recent Value   SBIRT (22 yo +)    In order to provide better care to our patients, we are screening all of our patients for alcohol and drug use  Would it be okay to ask you these screening questions? No Filed at: 03/25/2022 1125                    MDM    Disposition  Final diagnoses:   Chronic back pain     Time reflects when diagnosis was documented in both MDM as applicable and the Disposition within this note     Time User Action Codes Description Comment    3/25/2022 11:27 AM Wendie Shetty  Add [M54 9,  G89 29] Chronic back pain       ED Disposition     ED Disposition Condition Date/Time Comment    Discharge Stable Fri Mar 25, 2022 11:27 AM Janene Veloz discharge to home/self care              Follow-up Information     Follow up With Specialties Details Why Contact Info    Jarad Naik MD Family Medicine   [facility] [phone]            Patient's Medications   Discharge Prescriptions    LIDOCAINE (LIDODERM) 5 %    Apply 1 patch topically daily Remove & Discard patch within 12 hours or as directed by MD       Start Date: 3/25/2022 End Date: --       Order Dose: 1 patch Quantity: 7 patch    Refills: 0    METHOCARBAMOL (ROBAXIN) 500 MG TABLET    Take 1 tablet (500 mg total) by mouth daily at bedtime       Start Date: 3/25/2022 End Date: --       Order Dose: 500 mg       Quantity: 10 tablet    Refills: 0    PREDNISONE 10 MG TABLET    4 tabs po qd x 2 days then 3 tabs po qd x 2 days then 2 tabs po qd x 2 days then 1 tab po qd x 2 days       Start Date: 3/25/2022 End Date: --       Order Dose: --       Quantity: 20 tablet    Refills: 0       No discharge procedures on file      PDMP Review     None          ED Provider  Electronically Signed by           Radha Doyle PA-C  03/25/22 [id number] [facility] VLADISLAV Solo  03/25/22 5258

## 2023-03-14 ENCOUNTER — HOSPITAL ENCOUNTER (EMERGENCY)
Facility: HOSPITAL | Age: 33
Discharge: HOME | End: 2023-03-14
Attending: EMERGENCY MEDICINE | Admitting: EMERGENCY MEDICINE

## 2023-03-14 VITALS
HEART RATE: 93 BPM | OXYGEN SATURATION: 98 % | TEMPERATURE: 98.8 F | WEIGHT: 215 LBS | RESPIRATION RATE: 20 BRPM | SYSTOLIC BLOOD PRESSURE: 145 MMHG | DIASTOLIC BLOOD PRESSURE: 86 MMHG | BODY MASS INDEX: 34.7 KG/M2

## 2023-03-14 DIAGNOSIS — J02.0 STREP THROAT: Primary | ICD-10-CM

## 2023-03-14 DIAGNOSIS — J02.9 PHARYNGITIS, UNSPECIFIED ETIOLOGY: Primary | ICD-10-CM

## 2023-03-14 LAB — S PYO DNA THROAT QL NAA+PROBE: DETECTED

## 2023-03-14 RX ORDER — CLINDAMYCIN HYDROCHLORIDE 300 MG/1
300 CAPSULE ORAL 3 TIMES DAILY
Qty: 30 CAPSULE | Refills: 0 | Status: SHIPPED | OUTPATIENT
Start: 2023-03-14 | End: 2023-03-24

## 2023-03-14 RX ORDER — LIDOCAINE HYDROCHLORIDE 20 MG/ML
10 SOLUTION OROPHARYNGEAL 3 TIMES DAILY PRN
Qty: 100 ML | Refills: 0 | Status: SHIPPED | OUTPATIENT
Start: 2023-03-14

## 2023-03-14 RX ORDER — LIDOCAINE HYDROCHLORIDE 20 MG/ML
10 SOLUTION OROPHARYNGEAL 3 TIMES DAILY PRN
Qty: 100 ML | Refills: 0 | Status: SHIPPED | OUTPATIENT
Start: 2023-03-14 | End: 2023-03-14 | Stop reason: SDUPTHER

## 2023-03-14 RX ORDER — LIDOCAINE HYDROCHLORIDE 20 MG/ML
15 SOLUTION OROPHARYNGEAL ONCE
Status: COMPLETED | OUTPATIENT
Start: 2023-03-14 | End: 2023-03-14

## 2023-03-14 RX ADMIN — DEXAMETHASONE SODIUM PHOSPHATE 10 MG: 10 INJECTION, SOLUTION INTRAMUSCULAR; INTRAVENOUS at 05:45

## 2023-03-14 RX ADMIN — LIDOCAINE HYDROCHLORIDE 15 ML: 20 SOLUTION ORAL; TOPICAL at 05:46

## 2023-03-14 NOTE — Clinical Note
Benjyisisginger Osmar was seen and treated in our emergency department on 3/14/2023  No restrictions            Diagnosis:     Janene  may return to work on return date  She may return on this date: 03/15/2023         If you have any questions or concerns, please don't hesitate to call        Lauren Dawkins PA-C    ______________________________           _______________          _______________  Hospital Representative                              Date                                Time

## 2023-03-14 NOTE — Clinical Note
Maynor Vargas was seen and treated in our emergency department on 3/14/2023  No restrictions            Diagnosis:     Janene    She may return on this date:     Pt was in the emergency department on 3/14/23 at 5:30AM     If you have any questions or concerns, please don't hesitate to call        Na Torres PA-C    ______________________________           _______________          _______________  Hospital Representative                              Date                                Time Val Potter [phone]. Consult received to arrange home tube feeding and supplies. Per [facility] enteral coverage as following: $1000 dedudtible, 80% coverage, $4000 max out of pocket.  Met with patient  to discuss home care needs and enteral coverage;pt verbalized understanding and stated max out of pocket should already be met this year. Explained hospital affiliation with [facility], and home care choice list offered. Patient agreeable to [facility]. Homebound status verified. Address  and telephone number verified, correct as noted on facesheet. Epic orders have been placed. Writer spoke with Dr. Shin-pts tube feeding to start while in hospital with plans to discharge later today, as pt has been tolerating a full liquid diet. [facility] is unable to secure same day visit today. Dr. Vicente is Ok with [facility] admitting pt onto services tomorrow(11/5). Visit has been secured  with [facility] for tomorrow(11/5). Writer updated pt on above discharge plan, all questions regarding home care answered. [facility] liaison,Belen, aware of referral. Case Management to follow until discharge.

## 2023-03-14 NOTE — RESULT ENCOUNTER NOTE
Called and informed of +strep  Sent clinda tid x 10 days to pharmacy ([facility] allergy)   Send work note for today and tomorrow

## 2023-03-14 NOTE — DISCHARGE INSTRUCTIONS
Take medications as directed, if results are positive you will be notified  If results are positive antibiotic will be called into your pharmacy  Return for new or worsening complaints  Follow-up with primary care

## 2023-03-14 NOTE — Clinical Note
Cornelius Short was seen and treated in our emergency department on 3/14/2023  No restrictions            Diagnosis:     Janene    She may return on this date:     Pt was in the emergency department on 3/14/23 at 5:30AM     If you have any questions or concerns, please don't hesitate to call        Obed Onofre PA-C    ______________________________           _______________          _______________  Hospital Representative                              Date                                Time

## 2023-03-14 NOTE — ED PROVIDER NOTES
History  Chief Complaint   Patient presents with   • Sore Throat     Reports difficulty swallowing since yesterday  Also feels her glands are swollen  No fevers  33-year-old female without significant past medical history presents complaining of sore throat with painful swallowing for the past 2 days as well as swollen lymph nodes  Denies fevers  Denies cough  Denies any chest pain or shortness of breath  Has not taken any medications prior to arrival  Denies any other complaints       History provided by:  Patient   used: No        Prior to Admission Medications   Prescriptions Last Dose Informant Patient Reported? Taking?    cyclobenzaprine (FLEXERIL) 10 mg tablet   No No   Sig: Take 1 tablet (10 mg total) by mouth 2 (two) times a day as needed for muscle spasms   Patient not taking: Reported on 2021    famotidine (PEPCID) 20 mg tablet   No No   Sig: Take 1 tablet (20 mg total) by mouth 2 (two) times a day for 14 days   lidocaine (LIDODERM) 5 %   No No   Sig: Apply 1 patch topically daily Remove & Discard patch within 12 hours or as directed by MD   Patient not taking: Reported on 2021    lidocaine (Lidoderm) 5 %   No No   Sig: Apply 1 patch topically daily Remove & Discard patch within 12 hours or as directed by MD   methocarbamol (ROBAXIN) 500 mg tablet   No No   Sig: Take 1 tablet (500 mg total) by mouth daily at bedtime   naproxen (NAPROSYN) 500 mg tablet   No No   Sig: Take 1 tablet (500 mg total) by mouth 2 (two) times a day with meals   Patient not taking: Reported on 2021    ondansetron (Zofran ODT) 4 mg disintegrating tablet   No No   Sig: Take 1 tablet (4 mg total) by mouth every 6 (six) hours as needed for nausea or vomiting for up to 10 doses   Patient not taking: Reported on 3/25/2022    predniSONE 10 mg tablet   No No   Si tabs po qd x 2 days then 3 tabs po qd x 2 days then 2 tabs po qd x 2 days then 1 tab po qd x 2 days      Facility-Administered Medications: None       Past Medical History:   Diagnosis Date   • Anemia due to chronic blood loss     has had recent iron infusions   • Anxiety    • Asthma    • Bipolar disorder (HCC)    • Depression    • Dry cough     pt states allergy related   • Environmental and seasonal allergies    • Fibroid    • Fibroid uterus 2019   • Food allergy     raisins   • Varicella    • Wears contact lenses        Past Surgical History:   Procedure Laterality Date   • NO PAST SURGERIES     • MI SALPINGECTOMY COMPLETE/PARTIAL UNI/BI SPX Bilateral 2019    Procedure: SALPINGECTOMY;  Surgeon: Argentina Chavez MD;  [facility];  Service: Gynecology   • MI TOTAL ABDOMINAL HYSTERECT W/WO RMVL TUBE OVARY N/A 2019    Procedure: HYSTERECTOMY TOTAL ABDOMINAL (ANTONIO); Surgeon: Argentina Chavez MD;  [facility];  Service: Gynecology       Family History   Problem Relation Age of Onset   • Colon cancer Father          age 40   • Prostate cancer Father    • Cirrhosis Father    • Alcohol abuse Father    • Sleep apnea Father    • Cancer Maternal Grandmother    • Other Mother         hysterectomy   • No Known Problems Sister    • No Known Problems Brother    • Other Paternal Grandmother          during surgery   • No Known Problems Brother    • Breast cancer Neg Hx    • Ovarian cancer Neg Hx      I have reviewed and agree with the history as documented  E-Cigarette/Vaping   • E-Cigarette Use Never User      E-Cigarette/Vaping Substances   • Nicotine No    • THC No    • CBD No    • Flavoring No    • Other No    • Unknown No      Social History     Tobacco Use   • Smoking status: Never   • Smokeless tobacco: Never   Vaping Use   • Vaping Use: Never used   Substance Use Topics   • Alcohol use:  Yes     Alcohol/week: 4 0 - 5 0 standard drinks     Types: 4 - 5 Cans of beer per week     Comment: socially   • Drug use: Not Currently     Types: Marijuana     Comment: daily       Review of Systems   Constitutional: Negative  Negative for chills and fatigue  HENT: Positive for sore throat  Negative for drooling, ear pain and voice change  Eyes: Negative for photophobia and redness  Respiratory: Negative for apnea, cough and shortness of breath  Cardiovascular: Negative for chest pain  Gastrointestinal: Negative for abdominal pain, nausea and vomiting  Genitourinary: Negative for dysuria  Musculoskeletal: Negative for arthralgias, neck pain and neck stiffness  Skin: Negative for rash  Neurological: Negative for dizziness, tremors, syncope and weakness  Psychiatric/Behavioral: Negative for suicidal ideas  Physical Exam  Physical Exam  Constitutional:       General: She is not in acute distress  Appearance: She is well-developed  She is not diaphoretic  HENT:      Mouth/Throat:      Pharynx: Oropharyngeal exudate and posterior oropharyngeal erythema present  Comments: Posterior oropharynx with 2+ tonsillitis, significant erythema, mild exudates  No trismus, no drooling  Hoarse  however no change in voice  Eyes:      Pupils: Pupils are equal, round, and reactive to light  Cardiovascular:      Rate and Rhythm: Normal rate and regular rhythm  Pulmonary:      Effort: Pulmonary effort is normal  No respiratory distress  Breath sounds: Normal breath sounds  Abdominal:      General: Bowel sounds are normal  There is no distension  Palpations: Abdomen is soft  Musculoskeletal:         General: Normal range of motion  Cervical back: Normal range of motion and neck supple  Lymphadenopathy:      Cervical: Cervical adenopathy present  Skin:     General: Skin is warm and dry  Neurological:      Mental Status: She is alert and oriented to person, place, and time           Vital Signs  ED Triage Vitals [03/14/23 0532]   Temperature Pulse Respirations Blood Pressure SpO2   98 8 °F (37 1 °C) 93 20 145/86 98 %      Temp Source Heart Rate Source Patient Position - Orthostatic VS BP Location FiO2 (%)   Oral Monitor Sitting Left arm --      Pain Score       --           Vitals:    03/14/23 0532   BP: 145/86   Pulse: 93   Patient Position - Orthostatic VS: Sitting         Visual Acuity      ED Medications  Medications   Lidocaine Viscous HCl (XYLOCAINE) 2 % mucosal solution 15 mL (15 mL Swish & Swallow Given 3/14/23 0546)   dexamethasone oral liquid 10 mg 1 mL (10 mg Oral Given 3/14/23 0545)       Diagnostic Studies  Results Reviewed     Procedure Component Value Units Date/Time    Strep A PCR [[id number]] Collected: 03/14/23 0542    Lab Status: In process Specimen: Throat Updated: 03/14/23 0558    COVID/FLU [[id number]] Collected: 03/14/23 0542    Lab Status: In process Specimen: Nares from Nose Updated: 03/14/23 0558                 No orders to display              Procedures  Procedures         ED Course                               SBIRT 22yo+    Flowsheet Row Most Recent Value   SBIRT (25 yo +)    In order to provide better care to our patients, we are screening all of our patients for alcohol and drug use  Would it be okay to ask you these screening questions? No Filed at: 03/14/2023 0551                    Medical Decision Making  History and physical exam consistent with strep pharyngitis versus viral pharyngitis  Some relief with medications in the emergency department  Patient had no trismus, respiratory distress or drooling  Low concern for other pathology at this time  Strep test pending  Antibiotics will be sent if positive result is found  Medication sent for supportive care  Given return precautions  Discharged home  Amount and/or Complexity of Data Reviewed  Labs: ordered  Risk  Prescription drug management            Disposition  Final diagnoses:   Pharyngitis, unspecified etiology     Time reflects when diagnosis was documented in both MDM as applicable and the Disposition within this note     Time User Action Codes Description Comment    3/14/2023  5:42 AM Munira Cough Add [J02 0] Strep pharyngitis     3/14/2023  5:42 AM Dornica Jem Doll Remove [J02 0] Strep pharyngitis     3/14/2023  5:42 AM Dortha Jem Doll Add [J02 9] Pharyngitis, unspecified etiology       ED Disposition     ED Disposition   Discharge    Condition   Stable    Date/Time   Tue Mar 14, 2023  6:00 AM    Comment   [address] discharge to home/self care  Follow-up Information     Follow up With Specialties Details Why Contact Info Additional Information    Millie Brand, DO Family Medicine Call  As needed Bari Barr [address] [place] [address] [facility] Emergency Department Emergency Medicine Go to  If symptoms worsen [address] [phone] [address] [facility]          Patient's Medications   Discharge Prescriptions    LIDOCAINE VISCOUS HCL (XYLOCAINE) 2 % MUCOSAL SOLUTION    Swish and spit 10 mL 3 (three) times a day as needed for mouth pain or discomfort       Start Date: 3/14/2023 End Date: --       Order Dose: 10 mL       Quantity: 100 mL    Refills: 0       No discharge procedures on file      PDMP Review     None          ED Provider  Electronically Signed by           Juan Hussein PA-C  03/14/23 0600

## 2023-03-15 LAB
FLUAV RNA RESP QL NAA+PROBE: NEGATIVE
FLUBV RNA RESP QL NAA+PROBE: NEGATIVE
SARS-COV-2 RNA RESP QL NAA+PROBE: NEGATIVE

## 2023-03-16 ENCOUNTER — HOSPITAL ENCOUNTER (EMERGENCY)
Facility: HOSPITAL | Age: 33
Discharge: HOME | End: 2023-03-16
Attending: STUDENT IN AN ORGANIZED HEALTH CARE EDUCATION/TRAINING PROGRAM

## 2023-03-16 VITALS
SYSTOLIC BLOOD PRESSURE: 135 MMHG | TEMPERATURE: 97.8 F | RESPIRATION RATE: 18 BRPM | DIASTOLIC BLOOD PRESSURE: 94 MMHG | HEART RATE: 82 BPM | OXYGEN SATURATION: 99 % | WEIGHT: 218.8 LBS | BODY MASS INDEX: 35.32 KG/M2

## 2023-03-16 DIAGNOSIS — J02.0 STREP PHARYNGITIS: Primary | ICD-10-CM

## 2023-03-16 LAB
ALBUMIN SERPL BCP-MCNC: 3.9 G/DL (ref 3.5–5)
ALP SERPL-CCNC: 65 U/L (ref 34–104)
ALT SERPL W P-5'-P-CCNC: 20 U/L (ref 7–52)
ANION GAP SERPL CALCULATED.3IONS-SCNC: 5 MMOL/L (ref 4–13)
AST SERPL W P-5'-P-CCNC: 25 U/L (ref 13–39)
BASOPHILS # BLD AUTO: 0.01 THOUSANDS/ÂΜL (ref 0–0.1)
BASOPHILS NFR BLD AUTO: 0 % (ref 0–1)
BILIRUB SERPL-MCNC: 0.33 MG/DL (ref 0.2–1)
BUN SERPL-MCNC: 9 MG/DL (ref 5–25)
CALCIUM SERPL-MCNC: 8.5 MG/DL (ref 8.4–10.2)
CHLORIDE SERPL-SCNC: 103 MMOL/L (ref 96–108)
CO2 SERPL-SCNC: 30 MMOL/L (ref 21–32)
CREAT SERPL-MCNC: 0.69 MG/DL (ref 0.6–1.3)
EOSINOPHIL # BLD AUTO: 0.11 THOUSAND/ÂΜL (ref 0–0.61)
EOSINOPHIL NFR BLD AUTO: 2 % (ref 0–6)
ERYTHROCYTE [DISTWIDTH] IN BLOOD BY AUTOMATED COUNT: 13.5 % (ref 11.6–15.1)
GFR SERPL CREATININE-BSD FRML MDRD: 113 ML/MIN/1.73SQ M
GLUCOSE SERPL-MCNC: 84 MG/DL (ref 65–140)
HCT VFR BLD AUTO: 45.6 % (ref 34.8–46.1)
HGB BLD-MCNC: 14.4 G/DL (ref 11.5–15.4)
IMM GRANULOCYTES # BLD AUTO: 0.01 THOUSAND/UL (ref 0–0.2)
IMM GRANULOCYTES NFR BLD AUTO: 0 % (ref 0–2)
LYMPHOCYTES # BLD AUTO: 2.97 THOUSANDS/ÂΜL (ref 0.6–4.47)
LYMPHOCYTES NFR BLD AUTO: 43 % (ref 14–44)
MCH RBC QN AUTO: 26.2 PG (ref 26.8–34.3)
MCHC RBC AUTO-ENTMCNC: 31.6 G/DL (ref 31.4–37.4)
MCV RBC AUTO: 83 FL (ref 82–98)
MONOCYTES # BLD AUTO: 0.48 THOUSAND/ÂΜL (ref 0.17–1.22)
MONOCYTES NFR BLD AUTO: 7 % (ref 4–12)
NEUTROPHILS # BLD AUTO: 3.27 THOUSANDS/ÂΜL (ref 1.85–7.62)
NEUTS SEG NFR BLD AUTO: 48 % (ref 43–75)
NRBC BLD AUTO-RTO: 0 /100 WBCS
PLATELET # BLD AUTO: 391 THOUSANDS/UL (ref 149–390)
PMV BLD AUTO: 8.8 FL (ref 8.9–12.7)
POTASSIUM SERPL-SCNC: 5.7 MMOL/L (ref 3.5–5.3)
PROT SERPL-MCNC: 7.3 G/DL (ref 6.4–8.4)
RBC # BLD AUTO: 5.49 MILLION/UL (ref 3.81–5.12)
SODIUM SERPL-SCNC: 138 MMOL/L (ref 135–147)
WBC # BLD AUTO: 6.85 THOUSAND/UL (ref 4.31–10.16)

## 2023-03-16 RX ORDER — METHYLPREDNISOLONE SODIUM SUCCINATE 125 MG/2ML
125 INJECTION, POWDER, LYOPHILIZED, FOR SOLUTION INTRAMUSCULAR; INTRAVENOUS ONCE
Status: COMPLETED | OUTPATIENT
Start: 2023-03-16 | End: 2023-03-16

## 2023-03-16 RX ORDER — SODIUM CHLORIDE 9 MG/ML
250 INJECTION, SOLUTION INTRAVENOUS CONTINUOUS
Status: DISCONTINUED | OUTPATIENT
Start: 2023-03-16 | End: 2023-03-16 | Stop reason: HOSPADM

## 2023-03-16 RX ORDER — PREDNISONE 10 MG/1
TABLET ORAL
Qty: 30 TABLET | Refills: 0 | Status: SHIPPED | OUTPATIENT
Start: 2023-03-16

## 2023-03-16 RX ADMIN — METHYLPREDNISOLONE SODIUM SUCCINATE 125 MG: 125 INJECTION, POWDER, FOR SOLUTION INTRAMUSCULAR; INTRAVENOUS at 11:07

## 2023-03-16 RX ADMIN — SODIUM CHLORIDE 250 ML/HR: 0.9 INJECTION, SOLUTION INTRAVENOUS at 11:06

## 2023-03-16 NOTE — ED PROVIDER NOTES
History  Chief Complaint   Patient presents with   • Sore Throat     Recently seen and given abx for strep  States it does not feel better and she also has no taste     Pt with continued sore throat pt states feeling worse today       Sore Throat  Location:  Generalized  Quality:  Aching  Severity:  Moderate  Onset quality:  Gradual  Duration:  2 days  Timing:  Constant  Chronicity:  New  Relieved by:  Nothing  Worsened by:  Swallowing  Ineffective treatments: 2 days antibiotics   Associated symptoms: no abdominal pain    Risk factors: no exposure to strep        Prior to Admission Medications   Prescriptions Last Dose Informant Patient Reported? Taking?    Lidocaine Viscous HCl (XYLOCAINE) 2 % mucosal solution   No No   Sig: Swish and spit 10 mL 3 (three) times a day as needed for mouth pain or discomfort   clindamycin (CLEOCIN) 300 MG capsule   No No   Sig: Take 1 capsule (300 mg total) by mouth 3 (three) times a day for 10 days   cyclobenzaprine (FLEXERIL) 10 mg tablet   No No   Sig: Take 1 tablet (10 mg total) by mouth 2 (two) times a day as needed for muscle spasms   Patient not taking: Reported on 11/16/2021    famotidine (PEPCID) 20 mg tablet   No No   Sig: Take 1 tablet (20 mg total) by mouth 2 (two) times a day for 14 days   lidocaine (LIDODERM) 5 %   No No   Sig: Apply 1 patch topically daily Remove & Discard patch within 12 hours or as directed by MD   Patient not taking: Reported on 11/16/2021    lidocaine (Lidoderm) 5 %   No No   Sig: Apply 1 patch topically daily Remove & Discard patch within 12 hours or as directed by MD   methocarbamol (ROBAXIN) 500 mg tablet   No No   Sig: Take 1 tablet (500 mg total) by mouth daily at bedtime   naproxen (NAPROSYN) 500 mg tablet   No No   Sig: Take 1 tablet (500 mg total) by mouth 2 (two) times a day with meals   Patient not taking: Reported on 11/16/2021    ondansetron (Zofran ODT) 4 mg disintegrating tablet   No No   Sig: Take 1 tablet (4 mg total) by mouth every 6 (six) hours as needed for nausea or vomiting for up to 10 doses   Patient not taking: Reported on 3/25/2022    predniSONE 10 mg tablet   No No   Si tabs po qd x 2 days then 3 tabs po qd x 2 days then 2 tabs po qd x 2 days then 1 tab po qd x 2 days      Facility-Administered Medications: None       Past Medical History:   Diagnosis Date   • Anemia due to chronic blood loss     has had recent iron infusions   • Anxiety    • Asthma    • Bipolar disorder (HCC)    • Depression    • Dry cough     pt states allergy related   • Environmental and seasonal allergies    • Fibroid    • Fibroid uterus 2019   • Food allergy     raisins   • Varicella    • Wears contact lenses        Past Surgical History:   Procedure Laterality Date   • NO PAST SURGERIES     • IN SALPINGECTOMY COMPLETE/PARTIAL UNI/BI SPX Bilateral 2019    Procedure: SALPINGECTOMY;  Surgeon: Lyly Brooks MD;  Location: [facility];  Service: Gynecology   • IN TOTAL ABDOMINAL HYSTERECT W/WO RMVL TUBE OVARY N/A 2019    Procedure: HYSTERECTOMY TOTAL ABDOMINAL (ANTONIO); Surgeon: Lyly Brooks MD;  Location: [facility];  Service: Gynecology       Family History   Problem Relation Age of Onset   • Colon cancer Father          age 40   • Prostate cancer Father    • Cirrhosis Father    • Alcohol abuse Father    • Sleep apnea Father    • Cancer Maternal Grandmother    • Other Mother         hysterectomy   • No Known Problems Sister    • No Known Problems Brother    • Other Paternal Grandmother          during surgery   • No Known Problems Brother    • Breast cancer Neg Hx    • Ovarian cancer Neg Hx      I have reviewed and agree with the history as documented      E-Cigarette/Vaping   • E-Cigarette Use Never User      E-Cigarette/Vaping Substances   • Nicotine No    • THC No    • CBD No    • Flavoring No    • Other No    • Unknown No      Social History     Tobacco Use   • Smoking status: Never     Passive exposure: Never   • Smokeless tobacco: Never   Vaping Use   • Vaping Use: Never used   Substance Use Topics   • Alcohol use: Yes     Alcohol/week: 4 0 - 5 0 standard drinks     Types: 4 - 5 Cans of beer per week     Comment: socially   • Drug use: Not Currently     Types: Marijuana     Comment: daily       Review of Systems   Constitutional: Negative  HENT: Positive for sore throat  Eyes: Negative  Respiratory: Negative  Cardiovascular: Negative  Gastrointestinal: Negative  Negative for abdominal pain  Endocrine: Negative  Genitourinary: Negative  Musculoskeletal: Negative  Skin: Negative  Allergic/Immunologic: Negative  Neurological: Negative  Hematological: Negative  Psychiatric/Behavioral: Negative  All other systems reviewed and are negative  Physical Exam  Physical Exam  Vitals and nursing note reviewed  Constitutional:       Appearance: She is well-developed and normal weight  Comments: Pt states too painful to eat    HENT:      Head: Normocephalic and atraumatic  Right Ear: Tympanic membrane and ear canal normal       Left Ear: Tympanic membrane normal       Mouth/Throat:      Mouth: Mucous membranes are moist  No oral lesions  Pharynx: Uvula midline  Pharyngeal swelling, oropharyngeal exudate and posterior oropharyngeal erythema present  No uvula swelling  Tonsils: Tonsillar exudate present  No tonsillar abscesses  Comments: No trismus no voice change   Eyes:      Conjunctiva/sclera: Conjunctivae normal       Pupils: Pupils are equal, round, and reactive to light  Cardiovascular:      Rate and Rhythm: Normal rate and regular rhythm  Heart sounds: Normal heart sounds  Pulmonary:      Effort: Pulmonary effort is normal       Breath sounds: Normal breath sounds  Abdominal:      General: Bowel sounds are normal       Palpations: Abdomen is soft  Musculoskeletal:      Cervical back: Normal range of motion and neck supple  Skin:     General: Skin is warm  Capillary Refill: Capillary refill takes less than 2 seconds  Neurological:      General: No focal deficit present  Mental Status: She is alert and oriented to person, place, and time     Psychiatric:         Mood and Affect: Mood normal          Vital Signs  ED Triage Vitals [03/16/23 1018]   Temperature Pulse Respirations Blood Pressure SpO2   97 8 °F (36 6 °C) 82 18 135/94 99 %      Temp Source Heart Rate Source Patient Position - Orthostatic VS BP Location FiO2 (%)   Tympanic Monitor Sitting Left arm --      Pain Score       --           Vitals:    03/16/23 1018   BP: 135/94   Pulse: 82   Patient Position - Orthostatic VS: Sitting         Visual Acuity      ED Medications  Medications   methylPREDNISolone sodium succinate (Solu-MEDROL) injection 125 mg (125 mg Intravenous Given 3/16/23 1107)       Diagnostic Studies  Results Reviewed     Procedure Component Value Units Date/Time    Comprehensive metabolic panel [[id number]]  (Abnormal) Collected: 03/16/23 1106    Lab Status: Final result Specimen: Blood from Arm, Left Updated: 03/16/23 1202     Sodium 138 mmol/L      Potassium 5 7 mmol/L      Chloride 103 mmol/L      CO2 30 mmol/L      ANION GAP 5 mmol/L      BUN 9 mg/dL      Creatinine 0 69 mg/dL      Glucose 84 mg/dL      Calcium 8 5 mg/dL      AST 25 U/L      ALT 20 U/L      Alkaline Phosphatase 65 U/L      Total Protein 7 3 g/dL      Albumin 3 9 g/dL      Total Bilirubin 0 33 mg/dL      eGFR 113 ml/min/1 73sq m     Narrative:      [place] guidelines for Chronic Kidney Disease (CKD):   •  Stage 1 with normal or high GFR (GFR > 90 mL/min/1 73 square meters)  •  Stage 2 Mild CKD (GFR = 60-89 mL/min/1 73 square meters)  •  Stage 3A Moderate CKD (GFR = 45-59 mL/min/1 73 square meters)  •  Stage 3B Moderate CKD (GFR = 30-44 mL/min/1 73 square meters)  •  Stage 4 Severe CKD (GFR = 15-29 mL/min/1 73 square meters)  •  Stage 5 End Stage CKD (GFR <15 mL/min/1 73 square meters)  Note: GFR calculation is accurate only with a steady state creatinine    CBC and differential [[id number]]  (Abnormal) Collected: 03/16/23 1106    Lab Status: Final result Specimen: Blood from Arm, Left Updated: 03/16/23 1116     WBC 6 85 Thousand/uL      RBC 5 49 Million/uL      Hemoglobin 14 4 g/dL      Hematocrit 45 6 %      MCV 83 fL      MCH 26 2 pg      MCHC 31 6 g/dL      RDW 13 5 %      MPV 8 8 fL      Platelets 154 Thousands/uL      nRBC 0 /100 WBCs      Neutrophils Relative 48 %      Immat GRANS % 0 %      Lymphocytes Relative 43 %      Monocytes Relative 7 %      Eosinophils Relative 2 %      Basophils Relative 0 %      Neutrophils Absolute 3 27 Thousands/µL      Immature Grans Absolute 0 01 Thousand/uL      Lymphocytes Absolute 2 97 Thousands/µL      Monocytes Absolute 0 48 Thousand/µL      Eosinophils Absolute 0 11 Thousand/µL      Basophils Absolute 0 01 Thousands/µL                  No orders to display              Procedures  Procedures         ED Course                                             MDM    Disposition  Final diagnoses:   Strep pharyngitis     Time reflects when diagnosis was documented in both MDM as applicable and the Disposition within this note     Time User Action Codes Description Comment    3/16/2023 12:01 PM Carlo Bates  Add [J02 0] Strep pharyngitis       ED Disposition     ED Disposition   Discharge    Condition   Stable    Date/Time   Thu Mar 16, 2023 12:01 PM    Comment   [facility] discharge to home/self care  Follow-up Information     Follow up With Specialties Details Why Contact Info    Dawn Marin MD Family Medicine  continue your antibiotic [address]  [phone]            Discharge Medication List as of 3/16/2023 12:03 PM      START taking these medications    Details   ! ! predniSONE 10 mg tablet 5 tabs po qd x 2 days then 4 tabs po qd x 2 days then 3 tabs po qd x 2 days then 2 tabs po qd x 2 days then 1 tab po qd x 2 days, Print       !! - Potential duplicate medications found  Please discuss with provider  CONTINUE these medications which have NOT CHANGED    Details   clindamycin (CLEOCIN) 300 MG capsule Take 1 capsule (300 mg total) by mouth 3 (three) times a day for 10 days, Starting Tue 3/14/2023, Until Fri 3/24/2023, Normal      cyclobenzaprine (FLEXERIL) 10 mg tablet Take 1 tablet (10 mg total) by mouth 2 (two) times a day as needed for muscle spasms, Starting Wed 5/26/2021, Normal      famotidine (PEPCID) 20 mg tablet Take 1 tablet (20 mg total) by mouth 2 (two) times a day for 14 days, Starting Tue 11/16/2021, Until Tue 11/30/2021, Normal      !! lidocaine (LIDODERM) 5 % Apply 1 patch topically daily Remove & Discard patch within 12 hours or as directed by MD, Starting Wed 5/26/2021, Normal      !! lidocaine (Lidoderm) 5 % Apply 1 patch topically daily Remove & Discard patch within 12 hours or as directed by MD, Starting Fri 3/25/2022, Print      Lidocaine Viscous HCl (XYLOCAINE) 2 % mucosal solution Swish and spit 10 mL 3 (three) times a day as needed for mouth pain or discomfort, Starting Tue 3/14/2023, Normal      methocarbamol (ROBAXIN) 500 mg tablet Take 1 tablet (500 mg total) by mouth daily at bedtime, Starting Fri 3/25/2022, Print      naproxen (NAPROSYN) 500 mg tablet Take 1 tablet (500 mg total) by mouth 2 (two) times a day with meals, Starting Wed 5/26/2021, Normal      ondansetron (Zofran ODT) 4 mg disintegrating tablet Take 1 tablet (4 mg total) by mouth every 6 (six) hours as needed for nausea or vomiting for up to 10 doses, Starting Tue 11/16/2021, Normal      !! predniSONE 10 mg tablet 4 tabs po qd x 2 days then 3 tabs po qd x 2 days then 2 tabs po qd x 2 days then 1 tab po qd x 2 days, Print       !! - Potential duplicate medications found  Please discuss with provider  No discharge procedures on file      PDMP Review     None          ED Provider  Electronically Signed by           Paulina Darby Genia Hearn PA-C  03/16/23 1709

## 2023-08-31 ENCOUNTER — HOSPITAL ENCOUNTER (EMERGENCY)
Facility: HOSPITAL | Age: 33
Discharge: HOME | End: 2023-08-31
Attending: EMERGENCY MEDICINE
Payer: MEDICARE

## 2023-08-31 VITALS
DIASTOLIC BLOOD PRESSURE: 92 MMHG | HEART RATE: 83 BPM | RESPIRATION RATE: 18 BRPM | WEIGHT: 220.7 LBS | TEMPERATURE: 98.2 F | OXYGEN SATURATION: 96 % | SYSTOLIC BLOOD PRESSURE: 145 MMHG | BODY MASS INDEX: 35.62 KG/M2

## 2023-08-31 DIAGNOSIS — R11.2 NAUSEA, VOMITING, AND DIARRHEA: Primary | ICD-10-CM

## 2023-08-31 DIAGNOSIS — R19.7 NAUSEA, VOMITING, AND DIARRHEA: Primary | ICD-10-CM

## 2023-08-31 DIAGNOSIS — B34.9 VIRAL SYNDROME: ICD-10-CM

## 2023-08-31 LAB
FLUAV RNA RESP QL NAA+PROBE: NEGATIVE
FLUBV RNA RESP QL NAA+PROBE: NEGATIVE
RSV RNA RESP QL NAA+PROBE: NEGATIVE
SARS-COV-2 RNA RESP QL NAA+PROBE: NEGATIVE

## 2023-08-31 PROCEDURE — 96372 THER/PROPH/DIAG INJ SC/IM: CPT

## 2023-08-31 PROCEDURE — 99283 EMERGENCY DEPT VISIT LOW MDM: CPT

## 2023-08-31 PROCEDURE — 99284 EMERGENCY DEPT VISIT MOD MDM: CPT

## 2023-08-31 PROCEDURE — 0241U HB NFCT DS VIR RESP RNA 4 TRGT: CPT

## 2023-08-31 RX ORDER — DICYCLOMINE HCL 20 MG
20 TABLET ORAL ONCE
Status: COMPLETED | OUTPATIENT
Start: 2023-08-31 | End: 2023-08-31

## 2023-08-31 RX ORDER — ONDANSETRON 4 MG/1
4 TABLET, FILM COATED ORAL EVERY 6 HOURS PRN
Qty: 30 TABLET | Refills: 0 | Status: SHIPPED | OUTPATIENT
Start: 2023-08-31

## 2023-08-31 RX ORDER — LOPERAMIDE HYDROCHLORIDE 2 MG/1
2 CAPSULE ORAL ONCE
Status: COMPLETED | OUTPATIENT
Start: 2023-08-31 | End: 2023-08-31

## 2023-08-31 RX ORDER — DICYCLOMINE HCL 20 MG
20 TABLET ORAL 2 TIMES DAILY
Qty: 20 TABLET | Refills: 0 | Status: SHIPPED | OUTPATIENT
Start: 2023-08-31

## 2023-08-31 RX ORDER — KETOROLAC TROMETHAMINE 30 MG/ML
15 INJECTION, SOLUTION INTRAMUSCULAR; INTRAVENOUS ONCE
Status: COMPLETED | OUTPATIENT
Start: 2023-08-31 | End: 2023-08-31

## 2023-08-31 RX ORDER — ONDANSETRON 4 MG/1
4 TABLET, ORALLY DISINTEGRATING ORAL ONCE
Status: COMPLETED | OUTPATIENT
Start: 2023-08-31 | End: 2023-08-31

## 2023-08-31 RX ADMIN — LOPERAMIDE HYDROCHLORIDE 2 MG: 2 CAPSULE ORAL at 05:35

## 2023-08-31 RX ADMIN — KETOROLAC TROMETHAMINE 15 MG: 30 INJECTION, SOLUTION INTRAMUSCULAR; INTRAVENOUS at 05:33

## 2023-08-31 RX ADMIN — ONDANSETRON 4 MG: 4 TABLET, ORALLY DISINTEGRATING ORAL at 05:31

## 2023-08-31 RX ADMIN — DICYCLOMINE HYDROCHLORIDE 20 MG: 20 TABLET ORAL at 05:35

## 2023-08-31 NOTE — ED PROVIDER NOTES
History  Chief Complaint   Patient presents with   • Cold Like Symptoms     Pt C/O bodyaches, headaches, fatigue, chills, cough, runny nose and n/v/d since Monday. No medications pta. Had sick contact with friend with similar symptoms      The patient is a 56-year-old female with a past medical history of anemia, anxiety, asthma, allergic rhinitis, and bipolar disorder, who presents for evaluation of cold-like symptoms. She reports 3 days of fatigue, chills, headache, myalgias, dry cough, nausea, diarrhea and generalized abdominal pain since Monday. She explains that she has had nausea and dry heaving over the last few days, however today she had several episodes of vomiting. She also developed discomfort in the middle of her chest from the coughing and vomiting. No shortness of breath, urinary symptoms, back pain, sore throat, ear pain, rashes, or fevers. The patient does state that on Friday she shared a vape pen with someone who had similar symptoms. She has taken several OTC medications without relief. Prior to Admission Medications   Prescriptions Last Dose Informant Patient Reported? Taking?    Lidocaine Viscous HCl (XYLOCAINE) 2 % mucosal solution   No No   Sig: Swish and spit 10 mL 3 (three) times a day as needed for mouth pain or discomfort   cyclobenzaprine (FLEXERIL) 10 mg tablet   No No   Sig: Take 1 tablet (10 mg total) by mouth 2 (two) times a day as needed for muscle spasms   Patient not taking: Reported on 11/16/2021    famotidine (PEPCID) 20 mg tablet   No No   Sig: Take 1 tablet (20 mg total) by mouth 2 (two) times a day for 14 days   lidocaine (LIDODERM) 5 %   No No   Sig: Apply 1 patch topically daily Remove & Discard patch within 12 hours or as directed by MD   Patient not taking: Reported on 11/16/2021    lidocaine (Lidoderm) 5 %   No No   Sig: Apply 1 patch topically daily Remove & Discard patch within 12 hours or as directed by MD   methocarbamol (ROBAXIN) 500 mg tablet   No No Sig: Take 1 tablet (500 mg total) by mouth daily at bedtime   naproxen (NAPROSYN) 500 mg tablet   No No   Sig: Take 1 tablet (500 mg total) by mouth 2 (two) times a day with meals   Patient not taking: Reported on 2021    ondansetron (Zofran ODT) 4 mg disintegrating tablet   No No   Sig: Take 1 tablet (4 mg total) by mouth every 6 (six) hours as needed for nausea or vomiting for up to 10 doses   Patient not taking: Reported on 3/25/2022    predniSONE 10 mg tablet   No No   Si tabs po qd x 2 days then 3 tabs po qd x 2 days then 2 tabs po qd x 2 days then 1 tab po qd x 2 days   predniSONE 10 mg tablet   No No   Si tabs po qd x 2 days then 4 tabs po qd x 2 days then 3 tabs po qd x 2 days then 2 tabs po qd x 2 days then 1 tab po qd x 2 days      Facility-Administered Medications: None       Past Medical History:   Diagnosis Date   • Anemia due to chronic blood loss     has had recent iron infusions   • Anxiety    • Asthma    • Bipolar disorder (HCC)    • Depression    • Dry cough     pt states allergy related   • Environmental and seasonal allergies    • Fibroid    • Fibroid uterus 2019   • Food allergy     raisins   • Varicella    • Wears contact lenses        Past Surgical History:   Procedure Laterality Date   • NO PAST SURGERIES     • AZ SALPINGECTOMY COMPLETE/PARTIAL UNI/BI SPX Bilateral 2019    Procedure: SALPINGECTOMY;  Surgeon: Blondie Olszewski, MD;  Location: [facility];  Service: Gynecology   • AZ TOTAL ABDOMINAL HYSTERECT W/WO RMVL TUBE OVARY N/A 2019    Procedure: HYSTERECTOMY TOTAL ABDOMINAL (ANTONIO);   Surgeon: Blondie Olszewski, MD;  Location: [facility];  Service: Gynecology       Family History   Problem Relation Age of Onset   • Colon cancer Father          age 40   • Prostate cancer Father    • Cirrhosis Father    • Alcohol abuse Father    • Sleep apnea Father    • Cancer Maternal Grandmother    • Other Mother         hysterectomy   • No Known Problems Sister    • No Known Problems Brother    • Other Paternal Grandmother          during surgery   • No Known Problems Brother    • Breast cancer Neg Hx    • Ovarian cancer Neg Hx      I have reviewed and agree with the history as documented. E-Cigarette/Vaping   • E-Cigarette Use Never User      E-Cigarette/Vaping Substances   • Nicotine No    • THC No    • CBD No    • Flavoring No    • Other No    • Unknown No      Social History     Tobacco Use   • Smoking status: Never     Passive exposure: Never   • Smokeless tobacco: Never   Vaping Use   • Vaping Use: Never used   Substance Use Topics   • Alcohol use: Yes     Alcohol/week: 4.0 - 5.0 standard drinks of alcohol     Types: 4 - 5 Cans of beer per week     Comment: socially   • Drug use: Not Currently     Types: Marijuana     Comment: daily       Review of Systems   Constitutional: Positive for chills. Negative for appetite change and fever. HENT: Positive for rhinorrhea. Negative for congestion, ear discharge, ear pain and sore throat. Eyes: Negative for pain and visual disturbance. Respiratory: Negative for cough and shortness of breath. Cardiovascular: Positive for chest pain. Negative for palpitations. Gastrointestinal: Positive for abdominal pain, diarrhea, nausea and vomiting. Negative for abdominal distention and constipation. Genitourinary: Negative for dysuria and hematuria. Musculoskeletal: Positive for myalgias. Negative for arthralgias and back pain. Skin: Negative for color change and rash. Neurological: Positive for headaches. Negative for seizures and syncope. All other systems reviewed and are negative. Physical Exam  Physical Exam  Vitals and nursing note reviewed. Constitutional:       General: She is awake. Appearance: She is well-developed. She is obese. She is ill-appearing. She is not toxic-appearing or diaphoretic. HENT:      Head: Normocephalic and atraumatic.       Right Ear: Tympanic membrane, ear canal and external ear normal.      Left Ear: Tympanic membrane, ear canal and external ear normal.      Nose: Mucosal edema present. No congestion or rhinorrhea. Mouth/Throat:      Lips: Pink. Mouth: Mucous membranes are moist.      Pharynx: Oropharynx is clear. Uvula midline. No pharyngeal swelling, oropharyngeal exudate or posterior oropharyngeal erythema. Eyes:      General: Lids are normal. Vision grossly intact. Gaze aligned appropriately. No scleral icterus. Conjunctiva/sclera: Conjunctivae normal.      Pupils: Pupils are equal, round, and reactive to light. Cardiovascular:      Rate and Rhythm: Normal rate and regular rhythm. Heart sounds: S1 normal and S2 normal. No murmur heard. No friction rub. No gallop. Pulmonary:      Effort: Pulmonary effort is normal. No respiratory distress. Breath sounds: Normal breath sounds and air entry. No wheezing, rhonchi or rales. Abdominal:      General: Abdomen is flat. There is no distension. Palpations: Abdomen is soft. There is no mass. Tenderness: There is generalized abdominal tenderness (Mild). There is no guarding or rebound. Musculoskeletal:      Cervical back: Normal, full passive range of motion without pain and neck supple. No rigidity or crepitus. No spinous process tenderness or muscular tenderness. Thoracic back: Normal. No tenderness or bony tenderness. Lumbar back: Normal. No tenderness or bony tenderness. Lymphadenopathy:      Head:      Right side of head: No submental, submandibular, tonsillar, preauricular or posterior auricular adenopathy. Left side of head: No submental, submandibular, tonsillar, preauricular or posterior auricular adenopathy. Cervical: No cervical adenopathy. Skin:     General: Skin is warm. Capillary Refill: Capillary refill takes less than 2 seconds. Coloration: Skin is not pale. Findings: No rash.    Neurological:      Mental Status: She is alert and oriented to person, place, and time. Psychiatric:         Behavior: Behavior is cooperative. Vital Signs  ED Triage Vitals [08/31/23 0503]   Temperature Pulse Respirations Blood Pressure SpO2   98.2 °F (36.8 °C) 83 18 145/92 96 %      Temp Source Heart Rate Source Patient Position - Orthostatic VS BP Location FiO2 (%)   Oral Monitor Lying Left arm --      Pain Score       --           Vitals:    08/31/23 0503   BP: 145/92   Pulse: 83   Patient Position - Orthostatic VS: Lying       ED Medications  Medications   ondansetron (ZOFRAN-ODT) dispersible tablet 4 mg (4 mg Oral Given 8/31/23 0531)   ketorolac (TORADOL) injection 15 mg (15 mg Intramuscular Given 8/31/23 0533)   loperamide (IMODIUM) capsule 2 mg (2 mg Oral Given 8/31/23 0535)   dicyclomine (BENTYL) tablet 20 mg (20 mg Oral Given 8/31/23 0535)       Diagnostic Studies  Results Reviewed     Procedure Component Value Units Date/Time    FLU/RSV/COVID - if FLU/RSV clinically relevant [[id number]]  (Normal) Collected: 08/31/23 0536    Lab Status: Final result Specimen: Nares from Nasopharyngeal Swab Updated: 08/31/23 0619     SARS-CoV-2 Negative     INFLUENZA A PCR Negative     INFLUENZA B PCR Negative     RSV PCR Negative    Narrative:      FOR PEDIATRIC PATIENTS - copy/paste COVID Guidelines URL to browser: [email or web address]    SARS-CoV-2 assay is a Nucleic Acid Amplification assay intended for the  qualitative detection of nucleic acid from SARS-CoV-2 in nasopharyngeal  swabs. Results are for the presumptive identification of SARS-CoV-2 RNA. Positive results are indicative of infection with SARS-CoV-2, the virus  causing COVID-19, but do not rule out bacterial infection or co-infection  with other viruses. Laboratories within the [facility] and its  territories are required to report all positive results to the appropriate  public health authorities.  Negative results do not preclude SARS-CoV-2  infection and should not be used as the sole basis for treatment or other  patient management decisions. Negative results must be combined with  clinical observations, patient history, and epidemiological information. This test has not been FDA cleared or approved. This test has been authorized by FDA under an Emergency Use Authorization  (EUA). This test is only authorized for the duration of time the  declaration that circumstances exist justifying the authorization of the  emergency use of an in vitro diagnostic tests for detection of SARS-CoV-2  virus and/or diagnosis of COVID-19 infection under section 564(b)(1) of  the Act, 21 U. S.C. 772NPD-8(L)(2), unless the authorization is terminated  or revoked sooner. The test has been validated but independent review by FDA  and CLIA is pending. Test performed using [employer]: This RT-PCR assay targets N2,  a region unique to SARS-CoV-2. A conserved region in the E-gene was chosen  for pan-Sarbecovirus detection which includes SARS-CoV-2. According to CMS-2020-01-R, this platform meets the definition of high-throughput technology. No orders to display              Procedures  Procedures         ED Course  ED Course as of 08/31/23 0716   Thu Aug 31, 2023   0453 SARS-COV-2: Negative   0621 INFLU A PCR: Negative   0621 INFLU B PCR: Negative   0621 RSV PCR: Negative         SBIRT 20yo+    Flowsheet Row Most Recent Value   Initial Alcohol Screen: US AUDIT-C     1. How often do you have a drink containing alcohol? 0 Filed at: 08/31/2023 0507   2. How many drinks containing alcohol do you have on a typical day you are drinking? 0 Filed at: 08/31/2023 0507   3b. FEMALE Any Age, or MALE 65+: How often do you have 4 or more drinks on one occassion? 0 Filed at: 08/31/2023 0507   Audit-C Score 0 Filed at: 08/31/2023 7092   MERY: How many times in the past year have you. ..     Used an illegal drug or used a prescription medication for non-medical reasons? Never Filed at: 08/31/2023 0507            Medical Decision Making  Patient presents with several days of cold-like symptoms including N/V/D. On arrival she is ill-appearing, but afebrile and hemodynamically stable. The abdomen is soft and nontender. She has generalized tenderness to palpation of the abdomen without rebound or guarding. There is also reproducible chest tenderness with palpation. Differential diagnosis includes but is not limited to viral illness, URI, bronchitis, GERD, gastritis, gastroenteritis, or food poisoning; doubt pneumonia, cardiac etiology, or acute surgical intra-abdominal pathology. Viral testing negative. Patient was given Zofran, Toradol, Bentyl, and Imodium with some relief. She reports mild residual nausea, but was able to pass a p.o. challenge with both liquids and solids. Prescriptions for Zofran and Bentyl sent to the patient preferred pharmacy. Reviewed all results and recommendations with the patient and all questions were answered. Return precautions discussed and she verbalized understanding. Follow-up with PCP, and return to the ED in the interim with new or worsening symptoms. Nausea, vomiting, and diarrhea: acute illness or injury  Viral syndrome: acute illness or injury  Amount and/or Complexity of Data Reviewed  Labs:  Decision-making details documented in ED Course. Risk  Prescription drug management.           Disposition  Final diagnoses:   Nausea, vomiting, and diarrhea   Viral syndrome     Time reflects when diagnosis was documented in both MDM as applicable and the Disposition within this note     Time User Action Codes Description Comment    8/31/2023  6:30 AM Sharon Ruiz Add [R11.2,  R19.7] Nausea, vomiting, and diarrhea     8/31/2023  6:30 AM Sharon Ruiz Add [B34.9] Viral syndrome       ED Disposition     ED Disposition   Discharge    Condition   Stable    Date/Time   Thu Aug 31, 2023  6:30 AM    Comment   Janene Yesenia Elvi discharge to home/self care.                Follow-up Information    None         Discharge Medication List as of 8/31/2023  6:32 AM      START taking these medications    Details   dicyclomine (BENTYL) 20 mg tablet Take 1 tablet (20 mg total) by mouth 2 (two) times a day, Starting Thu 8/31/2023, Normal      ondansetron (ZOFRAN) 4 mg tablet Take 1 tablet (4 mg total) by mouth every 6 (six) hours as needed for nausea or vomiting, Starting Thu 8/31/2023, Normal         CONTINUE these medications which have NOT CHANGED    Details   cyclobenzaprine (FLEXERIL) 10 mg tablet Take 1 tablet (10 mg total) by mouth 2 (two) times a day as needed for muscle spasms, Starting Wed 5/26/2021, Normal      famotidine (PEPCID) 20 mg tablet Take 1 tablet (20 mg total) by mouth 2 (two) times a day for 14 days, Starting Tue 11/16/2021, Until Tue 11/30/2021, Normal      !! lidocaine (LIDODERM) 5 % Apply 1 patch topically daily Remove & Discard patch within 12 hours or as directed by MD, Starting Wed 5/26/2021, Normal      !! lidocaine (Lidoderm) 5 % Apply 1 patch topically daily Remove & Discard patch within 12 hours or as directed by MD, Starting Fri 3/25/2022, Print      Lidocaine Viscous HCl (XYLOCAINE) 2 % mucosal solution Swish and spit 10 mL 3 (three) times a day as needed for mouth pain or discomfort, Starting Tue 3/14/2023, Normal      methocarbamol (ROBAXIN) 500 mg tablet Take 1 tablet (500 mg total) by mouth daily at bedtime, Starting Fri 3/25/2022, Print      naproxen (NAPROSYN) 500 mg tablet Take 1 tablet (500 mg total) by mouth 2 (two) times a day with meals, Starting Wed 5/26/2021, Normal      ondansetron (Zofran ODT) 4 mg disintegrating tablet Take 1 tablet (4 mg total) by mouth every 6 (six) hours as needed for nausea or vomiting for up to 10 doses, Starting Tue 11/16/2021, Normal      !! predniSONE 10 mg tablet 4 tabs po qd x 2 days then 3 tabs po qd x 2 days then 2 tabs po qd x 2 days then 1 tab po qd x 2 days, Print      !! predniSONE 10 mg tablet 5 tabs po qd x 2 days then 4 tabs po qd x 2 days then 3 tabs po qd x 2 days then 2 tabs po qd x 2 days then 1 tab po qd x 2 days, Print       !! - Potential duplicate medications found. Please discuss with provider. No discharge procedures on file.     PDMP Review     None          ED Provider  Electronically Signed by           Jennifer Ayala PA-C  08/31/23 2857

## 2023-08-31 NOTE — Clinical Note
Marian Morgan was seen and treated in our emergency department on 8/31/2023. Diagnosis:     Janene  may return to work on return date. She may return on this date: 09/05/2023         If you have any questions or concerns, please don't hesitate to call.       Esme Fox PA-C    ______________________________           _______________          _______________  Hospital Representative                              Date                                Time

## 2023-09-21 ENCOUNTER — TELEPHONE (OUTPATIENT)
Dept: OBGYN CLINIC | Facility: CLINIC | Age: 33
End: 2023-09-21

## 2023-09-22 ENCOUNTER — HOSPITAL ENCOUNTER (EMERGENCY)
Facility: HOSPITAL | Age: 33
Discharge: HOME | End: 2023-09-22
Attending: EMERGENCY MEDICINE
Payer: MEDICARE

## 2023-09-22 ENCOUNTER — APPOINTMENT (EMERGENCY)
Dept: CT IMAGING | Facility: HOSPITAL | Age: 33
End: 2023-09-22
Payer: MEDICARE

## 2023-09-22 VITALS
HEART RATE: 62 BPM | DIASTOLIC BLOOD PRESSURE: 92 MMHG | BODY MASS INDEX: 35.64 KG/M2 | WEIGHT: 220.8 LBS | OXYGEN SATURATION: 100 % | SYSTOLIC BLOOD PRESSURE: 142 MMHG | TEMPERATURE: 97.8 F | RESPIRATION RATE: 16 BRPM

## 2023-09-22 DIAGNOSIS — R11.0 NAUSEA: ICD-10-CM

## 2023-09-22 DIAGNOSIS — R10.9 ABDOMINAL PAIN: Primary | ICD-10-CM

## 2023-09-22 LAB
ALBUMIN SERPL BCP-MCNC: 4 G/DL (ref 3.5–5)
ALP SERPL-CCNC: 50 U/L (ref 34–104)
ALT SERPL W P-5'-P-CCNC: 13 U/L (ref 7–52)
ANION GAP SERPL CALCULATED.3IONS-SCNC: 6 MMOL/L
AST SERPL W P-5'-P-CCNC: 13 U/L (ref 13–39)
BASOPHILS # BLD AUTO: 0.01 THOUSANDS/ÂΜL (ref 0–0.1)
BASOPHILS NFR BLD AUTO: 0 % (ref 0–1)
BILIRUB SERPL-MCNC: 0.32 MG/DL (ref 0.2–1)
BILIRUB UR QL STRIP: NEGATIVE
BUN SERPL-MCNC: 10 MG/DL (ref 5–25)
CALCIUM SERPL-MCNC: 8.7 MG/DL (ref 8.4–10.2)
CHLORIDE SERPL-SCNC: 107 MMOL/L (ref 96–108)
CLARITY UR: CLEAR
CO2 SERPL-SCNC: 26 MMOL/L (ref 21–32)
COLOR UR: YELLOW
CREAT SERPL-MCNC: 0.75 MG/DL (ref 0.6–1.3)
EOSINOPHIL # BLD AUTO: 0.29 THOUSAND/ÂΜL (ref 0–0.61)
EOSINOPHIL NFR BLD AUTO: 4 % (ref 0–6)
ERYTHROCYTE [DISTWIDTH] IN BLOOD BY AUTOMATED COUNT: 13.1 % (ref 11.6–15.1)
GFR SERPL CREATININE-BSD FRML MDRD: 103 ML/MIN/1.73SQ M
GLUCOSE SERPL-MCNC: 92 MG/DL (ref 65–140)
GLUCOSE UR STRIP-MCNC: NEGATIVE MG/DL
HCT VFR BLD AUTO: 45.2 % (ref 34.8–46.1)
HGB BLD-MCNC: 14.4 G/DL (ref 11.5–15.4)
HGB UR QL STRIP.AUTO: NEGATIVE
IMM GRANULOCYTES # BLD AUTO: 0.01 THOUSAND/UL (ref 0–0.2)
IMM GRANULOCYTES NFR BLD AUTO: 0 % (ref 0–2)
KETONES UR STRIP-MCNC: NEGATIVE MG/DL
LEUKOCYTE ESTERASE UR QL STRIP: NEGATIVE
LIPASE SERPL-CCNC: 21 U/L (ref 11–82)
LYMPHOCYTES # BLD AUTO: 2.1 THOUSANDS/ÂΜL (ref 0.6–4.47)
LYMPHOCYTES NFR BLD AUTO: 32 % (ref 14–44)
MCH RBC QN AUTO: 26.8 PG (ref 26.8–34.3)
MCHC RBC AUTO-ENTMCNC: 31.9 G/DL (ref 31.4–37.4)
MCV RBC AUTO: 84 FL (ref 82–98)
MONOCYTES # BLD AUTO: 0.58 THOUSAND/ÂΜL (ref 0.17–1.22)
MONOCYTES NFR BLD AUTO: 9 % (ref 4–12)
NEUTROPHILS # BLD AUTO: 3.55 THOUSANDS/ÂΜL (ref 1.85–7.62)
NEUTS SEG NFR BLD AUTO: 55 % (ref 43–75)
NITRITE UR QL STRIP: NEGATIVE
NRBC BLD AUTO-RTO: 0 /100 WBCS
PH UR STRIP.AUTO: 5 [PH]
PLATELET # BLD AUTO: 382 THOUSANDS/UL (ref 149–390)
PMV BLD AUTO: 9.2 FL (ref 8.9–12.7)
POTASSIUM SERPL-SCNC: 4.1 MMOL/L (ref 3.5–5.3)
PROT SERPL-MCNC: 6.8 G/DL (ref 6.4–8.4)
PROT UR STRIP-MCNC: NEGATIVE MG/DL
RBC # BLD AUTO: 5.37 MILLION/UL (ref 3.81–5.12)
SODIUM SERPL-SCNC: 139 MMOL/L (ref 135–147)
SP GR UR STRIP.AUTO: 1.02 (ref 1–1.04)
UROBILINOGEN UA: NEGATIVE MG/DL
WBC # BLD AUTO: 6.54 THOUSAND/UL (ref 4.31–10.16)

## 2023-09-22 PROCEDURE — 80053 COMPREHEN METABOLIC PANEL: CPT

## 2023-09-22 PROCEDURE — 96361 HYDRATE IV INFUSION ADD-ON: CPT

## 2023-09-22 PROCEDURE — 99285 EMERGENCY DEPT VISIT HI MDM: CPT

## 2023-09-22 PROCEDURE — 96374 THER/PROPH/DIAG INJ IV PUSH: CPT

## 2023-09-22 PROCEDURE — 36415 COLL VENOUS BLD VENIPUNCTURE: CPT

## 2023-09-22 PROCEDURE — 81003 URINALYSIS AUTO W/O SCOPE: CPT

## 2023-09-22 PROCEDURE — 74177 CT ABD & PELVIS W/CONTRAST: CPT

## 2023-09-22 PROCEDURE — 85025 COMPLETE CBC W/AUTO DIFF WBC: CPT

## 2023-09-22 PROCEDURE — 83690 ASSAY OF LIPASE: CPT

## 2023-09-22 PROCEDURE — G1004 CDSM NDSC: HCPCS

## 2023-09-22 PROCEDURE — 99284 EMERGENCY DEPT VISIT MOD MDM: CPT

## 2023-09-22 RX ORDER — NAPROXEN 500 MG/1
500 TABLET ORAL 2 TIMES DAILY WITH MEALS
Qty: 30 TABLET | Refills: 0 | Status: SHIPPED | OUTPATIENT
Start: 2023-09-22

## 2023-09-22 RX ORDER — KETOROLAC TROMETHAMINE 30 MG/ML
15 INJECTION, SOLUTION INTRAMUSCULAR; INTRAVENOUS ONCE
Status: COMPLETED | OUTPATIENT
Start: 2023-09-22 | End: 2023-09-22

## 2023-09-22 RX ORDER — ONDANSETRON 4 MG/1
4 TABLET, FILM COATED ORAL EVERY 6 HOURS
Qty: 20 TABLET | Refills: 0 | Status: SHIPPED | OUTPATIENT
Start: 2023-09-22

## 2023-09-22 RX ADMIN — KETOROLAC TROMETHAMINE 15 MG: 30 INJECTION, SOLUTION INTRAMUSCULAR; INTRAVENOUS at 06:01

## 2023-09-22 RX ADMIN — SODIUM CHLORIDE 1000 ML: 0.9 INJECTION, SOLUTION INTRAVENOUS at 06:02

## 2023-09-22 RX ADMIN — IOHEXOL 100 ML: 350 INJECTION, SOLUTION INTRAVENOUS at 07:14

## 2023-09-22 NOTE — ED PROVIDER NOTES
History  Chief Complaint   Patient presents with   • Abdominal Cramping     Pt having abdominal cramping for 2-3 weeks. No c/o n/v/d or sob or chest pain. No meds pta     The patient is a 75-year-old female with a past medical history of anemia, anxiety, asthma, allergic rhinitis, and bipolar disorder, who presents for evaluation of abdominal cramping. She was seen in the department approximately 3 weeks ago due to cold-like symptoms, nausea, vomiting, diarrhea, and abdominal cramping. Her cold-like symptoms and diarrhea resolved, but she reports persistent nausea and abdominal discomfort. She is able to tolerate liquids, however states whenever she tries to eat solids she begins to experience nausea and worsening abdominal cramping. The pain is non-localized and she describes it as a "wave" of pain that moves, especially when trying to have a bowel movement. Associated symptoms include tenesmus, freuqent burping, and abdominal distension. Last BM was yesterday. No melena, hematochezia, vomiting, urinary symptoms, back pain, or F/C. No recent diet changes. Prior to Admission Medications   Prescriptions Last Dose Informant Patient Reported? Taking?    Lidocaine Viscous HCl (XYLOCAINE) 2 % mucosal solution   No No   Sig: Swish and spit 10 mL 3 (three) times a day as needed for mouth pain or discomfort   cyclobenzaprine (FLEXERIL) 10 mg tablet Not Taking  No No   Sig: Take 1 tablet (10 mg total) by mouth 2 (two) times a day as needed for muscle spasms   Patient not taking: Reported on 11/16/2021   dicyclomine (BENTYL) 20 mg tablet   No No   Sig: Take 1 tablet (20 mg total) by mouth 2 (two) times a day   famotidine (PEPCID) 20 mg tablet   No No   Sig: Take 1 tablet (20 mg total) by mouth 2 (two) times a day for 14 days   lidocaine (LIDODERM) 5 % Not Taking  No No   Sig: Apply 1 patch topically daily Remove & Discard patch within 12 hours or as directed by MD   Patient not taking: Reported on 11/16/2021 lidocaine (Lidoderm) 5 %   No No   Sig: Apply 1 patch topically daily Remove & Discard patch within 12 hours or as directed by MD   methocarbamol (ROBAXIN) 500 mg tablet   No No   Sig: Take 1 tablet (500 mg total) by mouth daily at bedtime   naproxen (NAPROSYN) 500 mg tablet Not Taking  No No   Sig: Take 1 tablet (500 mg total) by mouth 2 (two) times a day with meals   Patient not taking: Reported on 2021   ondansetron (ZOFRAN) 4 mg tablet   No No   Sig: Take 1 tablet (4 mg total) by mouth every 6 (six) hours as needed for nausea or vomiting   ondansetron (Zofran ODT) 4 mg disintegrating tablet Not Taking  No No   Sig: Take 1 tablet (4 mg total) by mouth every 6 (six) hours as needed for nausea or vomiting for up to 10 doses   Patient not taking: Reported on 3/25/2022   predniSONE 10 mg tablet   No No   Si tabs po qd x 2 days then 3 tabs po qd x 2 days then 2 tabs po qd x 2 days then 1 tab po qd x 2 days   predniSONE 10 mg tablet   No No   Si tabs po qd x 2 days then 4 tabs po qd x 2 days then 3 tabs po qd x 2 days then 2 tabs po qd x 2 days then 1 tab po qd x 2 days      Facility-Administered Medications: None       Past Medical History:   Diagnosis Date   • Anemia due to chronic blood loss     has had recent iron infusions   • Anxiety    • Asthma    • Bipolar disorder (HCC)    • Depression    • Dry cough     pt states allergy related   • Environmental and seasonal allergies    • Fibroid    • Fibroid uterus 2019   • Food allergy     raisins   • Varicella    • Wears contact lenses        Past Surgical History:   Procedure Laterality Date   • NO PAST SURGERIES     • AZ SALPINGECTOMY COMPLETE/PARTIAL UNI/BI SPX Bilateral 2019    Procedure: SALPINGECTOMY;  Surgeon: Willie Gutierrez MD;  Location: [facility];  Service: Gynecology   • AZ TOTAL ABDOMINAL HYSTERECT W/WO RMVL TUBE OVARY N/A 2019    Procedure: HYSTERECTOMY TOTAL ABDOMINAL (ANTONIO);   Surgeon: Willie Gutierrez MD;  Location: [facility]; Service: Gynecology       Family History   Problem Relation Age of Onset   • Colon cancer Father          age 40   • Prostate cancer Father    • Cirrhosis Father    • Alcohol abuse Father    • Sleep apnea Father    • Cancer Maternal Grandmother    • Other Mother         hysterectomy   • No Known Problems Sister    • No Known Problems Brother    • Other Paternal Grandmother          during surgery   • No Known Problems Brother    • Breast cancer Neg Hx    • Ovarian cancer Neg Hx      I have reviewed and agree with the history as documented. E-Cigarette/Vaping   • E-Cigarette Use Never User      E-Cigarette/Vaping Substances   • Nicotine No    • THC No    • CBD No    • Flavoring No    • Other No    • Unknown No      Social History     Tobacco Use   • Smoking status: Never     Passive exposure: Never   • Smokeless tobacco: Never   Vaping Use   • Vaping Use: Never used   Substance Use Topics   • Alcohol use: Yes     Alcohol/week: 4.0 - 5.0 standard drinks of alcohol     Types: 4 - 5 Cans of beer per week     Comment: socially   • Drug use: Not Currently     Types: Marijuana     Comment: daily       Review of Systems   Constitutional: Negative for appetite change, chills and fever. HENT: Negative for ear pain and sore throat. Eyes: Negative for pain and visual disturbance. Respiratory: Negative for cough and shortness of breath. Cardiovascular: Negative for chest pain and palpitations. Gastrointestinal: Positive for abdominal distention, abdominal pain and nausea. Negative for anal bleeding, blood in stool, constipation, diarrhea and vomiting. Genitourinary: Negative for dysuria and hematuria. Musculoskeletal: Negative for arthralgias, back pain and myalgias. Skin: Negative for color change and rash. Neurological: Negative for seizures, syncope, light-headedness and headaches. All other systems reviewed and are negative.       Physical Exam  Physical Exam  Vitals and nursing note reviewed. Constitutional:       General: She is awake. Appearance: She is well-developed. She is obese. She is not toxic-appearing or diaphoretic. HENT:      Head: Normocephalic and atraumatic. Right Ear: External ear normal.      Left Ear: External ear normal.      Nose: Nose normal.      Mouth/Throat:      Lips: Pink. Mouth: Mucous membranes are moist.   Eyes:      General: Lids are normal. Vision grossly intact. Gaze aligned appropriately. No scleral icterus. Conjunctiva/sclera: Conjunctivae normal.      Pupils: Pupils are equal, round, and reactive to light. Cardiovascular:      Rate and Rhythm: Normal rate and regular rhythm. Heart sounds: S1 normal and S2 normal. No murmur heard. No friction rub. No gallop. Pulmonary:      Effort: Pulmonary effort is normal. No respiratory distress. Breath sounds: Normal breath sounds and air entry. No wheezing, rhonchi or rales. Abdominal:      General: There is distension (Mild). Palpations: Abdomen is soft. There is no mass. Tenderness: There is generalized abdominal tenderness. There is no guarding or rebound. Negative signs include Esparza's sign and Rovsing's sign. Musculoskeletal:      Cervical back: Normal, full passive range of motion without pain and neck supple. No rigidity or crepitus. No spinous process tenderness or muscular tenderness. Thoracic back: Normal. No tenderness or bony tenderness. Lumbar back: Normal. No tenderness or bony tenderness. Skin:     General: Skin is warm. Capillary Refill: Capillary refill takes less than 2 seconds. Coloration: Skin is not jaundiced or pale. Findings: No rash. Neurological:      Mental Status: She is alert and oriented to person, place, and time. Psychiatric:         Behavior: Behavior is cooperative.          Vital Signs  ED Triage Vitals   Temperature Pulse Respirations Blood Pressure SpO2   09/22/23 0519 09/22/23 0519 09/22/23 0519 09/22/23 0519 09/22/23 0519   97.8 °F (36.6 °C) 69 18 136/96 97 %      Temp Source Heart Rate Source Patient Position - Orthostatic VS BP Location FiO2 (%)   09/22/23 0519 09/22/23 0519 09/22/23 0519 09/22/23 0519 --   Tympanic Monitor Sitting Left arm       Pain Score       09/22/23 0601       8           Vitals:    09/22/23 0519   BP: 136/96   Pulse: 69   Patient Position - Orthostatic VS: Sitting       ED Medications  Medications   sodium chloride 0.9 % bolus 1,000 mL (0 mL Intravenous Stopped 9/22/23 0724)   ketorolac (TORADOL) injection 15 mg (15 mg Intravenous Given 9/22/23 0601)   iohexol (OMNIPAQUE) 350 MG/ML injection (SINGLE-DOSE) 100 mL (100 mL Intravenous Given 9/22/23 0714)       Diagnostic Studies  Results Reviewed     Procedure Component Value Units Date/Time    Comprehensive metabolic panel [[id number]] Collected: 09/22/23 0601    Lab Status: Final result Specimen: Blood from Line, Venous Updated: 09/22/23 0645     Sodium 139 mmol/L      Potassium 4.1 mmol/L      Chloride 107 mmol/L      CO2 26 mmol/L      ANION GAP 6 mmol/L      BUN 10 mg/dL      Creatinine 0.75 mg/dL      Glucose 92 mg/dL      Calcium 8.7 mg/dL      AST 13 U/L      ALT 13 U/L      Alkaline Phosphatase 50 U/L      Total Protein 6.8 g/dL      Albumin 4.0 g/dL      Total Bilirubin 0.32 mg/dL      eGFR 103 ml/min/1.73sq m     Narrative:      [place] guidelines for Chronic Kidney Disease (CKD):   •  Stage 1 with normal or high GFR (GFR > 90 mL/min/1.73 square meters)  •  Stage 2 Mild CKD (GFR = 60-89 mL/min/1.73 square meters)  •  Stage 3A Moderate CKD (GFR = 45-59 mL/min/1.73 square meters)  •  Stage 3B Moderate CKD (GFR = 30-44 mL/min/1.73 square meters)  •  Stage 4 Severe CKD (GFR = 15-29 mL/min/1.73 square meters)  •  Stage 5 End Stage CKD (GFR <15 mL/min/1.73 square meters)  Note: GFR calculation is accurate only with a steady state creatinine    Lipase [[id number]]  (Normal) Collected: 09/22/23 0601    Lab Status: Final result Specimen: Blood from Line, Venous Updated: 09/22/23 0645     Lipase 21 u/L     UA w Reflex to Microscopic w Reflex to Culture [[id number]]  (Normal) Collected: 09/22/23 0601    Lab Status: Final result Specimen: Urine, Clean Catch Updated: 09/22/23 0623     Color, UA Yellow     Clarity, UA Clear     Specific Gravity, UA 1.020     pH, UA 5.0     Leukocytes, UA Negative     Nitrite, UA Negative     Protein, UA Negative mg/dl      Glucose, UA Negative mg/dl      Ketones, UA Negative mg/dl      Bilirubin, UA Negative     Occult Blood, UA Negative     UROBILINOGEN UA Negative mg/dL     CBC and differential [[id number]]  (Abnormal) Collected: 09/22/23 0601    Lab Status: Final result Specimen: Blood from Line, Venous Updated: 09/22/23 0614     WBC 6.54 Thousand/uL      RBC 5.37 Million/uL      Hemoglobin 14.4 g/dL      Hematocrit 45.2 %      MCV 84 fL      MCH 26.8 pg      MCHC 31.9 g/dL      RDW 13.1 %      MPV 9.2 fL      Platelets 531 Thousands/uL      nRBC 0 /100 WBCs      Neutrophils Relative 55 %      Immat GRANS % 0 %      Lymphocytes Relative 32 %      Monocytes Relative 9 %      Eosinophils Relative 4 %      Basophils Relative 0 %      Neutrophils Absolute 3.55 Thousands/µL      Immature Grans Absolute 0.01 Thousand/uL      Lymphocytes Absolute 2.10 Thousands/µL      Monocytes Absolute 0.58 Thousand/µL      Eosinophils Absolute 0.29 Thousand/µL      Basophils Absolute 0.01 Thousands/µL                  CT abdomen pelvis with contrast   Final Result by Cristin Bond MD (09/22 1646)      No acute abdominopelvic process.             Workstation performed: [id number]                    Procedures  Procedures         ED Course  ED Course as of 09/22/23 0756   [facility] Sep 22, 2023   0657 Lipase: 21   0657 UA w Reflex to Microscopic w Reflex to Culture  WNL   0657 Comprehensive metabolic panel  WNL   8424 WBC: 6.54   0657 Hemoglobin: 14.4   0657 HCT: 45.2   0739 CT abdomen pelvis with contrast  IMPRESSION:  No acute abdominopelvic process.            SBIRT 20yo+    Flowsheet Row Most Recent Value   Initial Alcohol Screen: US AUDIT-C     1. How often do you have a drink containing alcohol? 0 Filed at: 09/22/2023 0520   2. How many drinks containing alcohol do you have on a typical day you are drinking? 0 Filed at: 09/22/2023 0520   3b. FEMALE Any Age, or MALE 65+: How often do you have 4 or more drinks on one occassion? 0 Filed at: 09/22/2023 0520   Audit-C Score 0 Filed at: 09/22/2023 1687   MERY: How many times in the past year have you. .. Used an illegal drug or used a prescription medication for non-medical reasons? Never Filed at: 09/22/2023 3186 [facility]  Patient presents for generalized abdominal cramping, nausea, and tenesmus ongoing for 3 weeks. She is afebrile and hemodynamically stable. On exam the abdomen is soft and mildly distended. There is generalized tenderness to palpation of the abdomen without rebound or guarding. Differential diagnosis includes but is not limited to gastritis, gastroenteritis, GERD, PUD, gastroparesis, hepatitis, pancreatitis, IBS, IBS, enteritis, colitis, constipation, ileus, or bowel obstruction. Labs, urinalysis, and CT of the abdomen and pelvis all unremarkable. Reviewed results with the patient and all of her questions were answered to the best of my ability. GI referral placed for further evaluation. Strict return precautions discussed and she verbalized understanding. Follow up with PCP and GI, but return to the ED in the interim with new or worsening symptoms. Amount and/or Complexity of Data Reviewed  Labs: ordered. Decision-making details documented in ED Course. Radiology: ordered. Decision-making details documented in ED Course. Risk  Prescription drug management.           Disposition  Final diagnoses:   Abdominal pain   Nausea     Time reflects when diagnosis was documented in both MDM as applicable and the Disposition within this note     Time User Action Codes Description Comment    9/22/2023  7:49 AM Sharon Ruiz Add [R10.9] Abdominal pain     9/22/2023  7:49 AM Sharon Ruiz [R11.0] Nausea       ED Disposition     ED Disposition   Discharge    Condition   Stable    Date/Time   Fri Sep 22, 2023  7:55 AM    Comment   [facility] discharge to home/self care.                Follow-up Information    None         Patient's Medications   Discharge Prescriptions    NAPROXEN (NAPROSYN) 500 MG TABLET    Take 1 tablet (500 mg total) by mouth 2 (two) times a day with meals       Start Date: 9/22/2023 End Date: --       Order Dose: 500 mg       Quantity: 30 tablet    Refills: 0    ONDANSETRON (ZOFRAN) 4 MG TABLET    Take 1 tablet (4 mg total) by mouth every 6 (six) hours       Start Date: 9/22/2023 End Date: --       Order Dose: 4 mg       Quantity: 20 tablet    Refills: 0           PDMP Review     None          ED Provider  Electronically Signed by           Shannon Lam PA-C  09/22/23 0264

## 2023-09-22 NOTE — Clinical Note
Neymar Hill was seen and treated in our emergency department on 9/22/2023. Diagnosis:     Janene  may return to work on return date. She may return on this date: 09/25/2023         If you have any questions or concerns, please don't hesitate to call.       Danny Clark PA-C    ______________________________           _______________          _______________  Hospital Representative                              Date                                Time

## 2023-09-22 NOTE — Clinical Note
Halima Srivastava was seen and treated in our emergency department on 9/22/2023. Diagnosis:     Janene  may return to work on return date. She may return on this date: 09/25/2023         If you have any questions or concerns, please don't hesitate to call.       Dottie Brooks PA-C    ______________________________           _______________          _______________  Hospital Representative                              Date                                Time No complaints

## 2024-01-23 ENCOUNTER — HOSPITAL ENCOUNTER (EMERGENCY)
Facility: HOSPITAL | Age: 34
Discharge: HOME | End: 2024-01-23
Attending: EMERGENCY MEDICINE | Admitting: EMERGENCY MEDICINE
Payer: MEDICARE

## 2024-01-23 VITALS
OXYGEN SATURATION: 96 % | BODY MASS INDEX: 36.19 KG/M2 | HEART RATE: 82 BPM | RESPIRATION RATE: 20 BRPM | DIASTOLIC BLOOD PRESSURE: 92 MMHG | TEMPERATURE: 98.4 F | SYSTOLIC BLOOD PRESSURE: 130 MMHG | WEIGHT: 224.2 LBS

## 2024-01-23 DIAGNOSIS — B34.9 VIRAL SYNDROME: Primary | ICD-10-CM

## 2024-01-23 DIAGNOSIS — R11.2 NAUSEA, VOMITING, AND DIARRHEA: ICD-10-CM

## 2024-01-23 DIAGNOSIS — R19.7 NAUSEA, VOMITING, AND DIARRHEA: ICD-10-CM

## 2024-01-23 PROCEDURE — 0241U HB NFCT DS VIR RESP RNA 4 TRGT: CPT

## 2024-01-23 PROCEDURE — 99283 EMERGENCY DEPT VISIT LOW MDM: CPT

## 2024-01-23 PROCEDURE — 99284 EMERGENCY DEPT VISIT MOD MDM: CPT

## 2024-01-23 PROCEDURE — 96372 THER/PROPH/DIAG INJ SC/IM: CPT

## 2024-01-23 RX ORDER — DICYCLOMINE HYDROCHLORIDE 10 MG/1
10 CAPSULE ORAL
Qty: 28 CAPSULE | Refills: 0 | Status: SHIPPED | OUTPATIENT
Start: 2024-01-23 | End: 2024-01-31 | Stop reason: SDUPTHER

## 2024-01-23 RX ORDER — LIDOCAINE HYDROCHLORIDE 20 MG/ML
15 SOLUTION OROPHARYNGEAL ONCE
Status: COMPLETED | OUTPATIENT
Start: 2024-01-23 | End: 2024-01-23

## 2024-01-23 RX ORDER — KETOROLAC TROMETHAMINE 30 MG/ML
15 INJECTION, SOLUTION INTRAMUSCULAR; INTRAVENOUS ONCE
Status: COMPLETED | OUTPATIENT
Start: 2024-01-23 | End: 2024-01-23

## 2024-01-23 RX ORDER — MAGNESIUM HYDROXIDE/ALUMINUM HYDROXICE/SIMETHICONE 120; 1200; 1200 MG/30ML; MG/30ML; MG/30ML
30 SUSPENSION ORAL ONCE
Status: COMPLETED | OUTPATIENT
Start: 2024-01-23 | End: 2024-01-23

## 2024-01-23 RX ORDER — ONDANSETRON 4 MG/1
4 TABLET, FILM COATED ORAL EVERY 6 HOURS PRN
Qty: 20 TABLET | Refills: 0 | Status: SHIPPED | OUTPATIENT
Start: 2024-01-23 | End: 2024-01-31

## 2024-01-23 RX ORDER — DICYCLOMINE HCL 20 MG
20 TABLET ORAL ONCE
Status: COMPLETED | OUTPATIENT
Start: 2024-01-23 | End: 2024-01-23

## 2024-01-23 RX ORDER — ALUMINA, MAGNESIA, AND SIMETHICONE 2400; 2400; 240 MG/30ML; MG/30ML; MG/30ML
10 SUSPENSION ORAL EVERY 6 HOURS PRN
Qty: 355 ML | Refills: 0 | Status: SHIPPED | OUTPATIENT
Start: 2024-01-23 | End: 2024-01-31 | Stop reason: SDUPTHER

## 2024-01-23 RX ORDER — NAPROXEN 500 MG/1
500 TABLET ORAL 2 TIMES DAILY WITH MEALS
Qty: 30 TABLET | Refills: 0 | Status: SHIPPED | OUTPATIENT
Start: 2024-01-23

## 2024-01-23 RX ADMIN — LIDOCAINE HYDROCHLORIDE 15 ML: 20 SOLUTION ORAL; TOPICAL at 03:39

## 2024-01-23 RX ADMIN — ALUMINUM HYDROXIDE, MAGNESIUM HYDROXIDE, AND SIMETHICONE 30 ML: 200; 200; 20 SUSPENSION ORAL at 03:39

## 2024-01-23 RX ADMIN — KETOROLAC TROMETHAMINE 15 MG: 30 INJECTION, SOLUTION INTRAMUSCULAR; INTRAVENOUS at 03:41

## 2024-01-23 RX ADMIN — DICYCLOMINE HYDROCHLORIDE 20 MG: 20 TABLET ORAL at 03:43

## 2024-01-23 NOTE — Clinical Note
Janene Veloz was seen and treated in our emergency department on 1/23/2024.                Diagnosis:     Janene  may return to work on return date.    She may return on this date: 01/23/2024         If you have any questions or concerns, please don't hesitate to call.      Sharon Ruiz PA-C    ______________________________           _______________          _______________  Hospital Representative                              Date                                Time

## 2024-01-23 NOTE — ED PROVIDER NOTES
"History  Chief Complaint   Patient presents with    COVID-19 Exposure     Pt c/o fever, chills, body aches, no sense of taste/smell, and n/v/d since Saturday. Pt states \"my manager said she had covid, but I couldn't tell if she was joking\". No meds pta.      The patient is a 33-year-old female with a past medical history of anemia, anxiety, asthma, allergic rhinitis, and bipolar disorder, who presents for evaluation of cold-like symptoms.  She reports chills, headache, generalized myalgias, fatigue, congestion, rhinorrhea, anosmia, ageusia, cough, abdominal pain, nausea, and diarrhea x 3 days.  She reports a fever on Saturday and one episode of vomiting yesterday, but both symptoms arrived PTA.  She denies sore throat, ear pain, urinary symptoms, or shortness of breath.  The patient reports her son and her boss are both sick as well.  No medications taken PTA.        None       Past Medical History:   Diagnosis Date    Anemia due to chronic blood loss     has had recent iron infusions    Anxiety     Asthma     Bipolar disorder (HCC)     Depression     Dry cough     pt states allergy related    Environmental and seasonal allergies     Fibroid     Fibroid uterus 2019    Food allergy     raisins    Varicella     Wears contact lenses        Past Surgical History:   Procedure Laterality Date    NO PAST SURGERIES      ME SALPINGECTOMY COMPLETE/PARTIAL UNI/BI SPX Bilateral 2019    Procedure: SALPINGECTOMY;  Surgeon: Emilee Hernandez MD;  Location: [facility];  Service: Gynecology    ME TOTAL ABDOMINAL HYSTERECT W/WO RMVL TUBE OVARY N/A 2019    Procedure: HYSTERECTOMY TOTAL ABDOMINAL (ANTONIO);  Surgeon: Emilee Hernandez MD;  Location: [facility];  Service: Gynecology       Family History   Problem Relation Age of Onset    Colon cancer Father          age 44    Prostate cancer Father     Cirrhosis Father     Alcohol abuse Father     Sleep apnea Father     Cancer Maternal Grandmother     Other Mother         " hysterectomy    No Known Problems Sister     No Known Problems Brother     Other Paternal Grandmother          during surgery    No Known Problems Brother     Breast cancer Neg Hx     Ovarian cancer Neg Hx      I have reviewed and agree with the history as documented.    E-Cigarette/Vaping    E-Cigarette Use Never User      E-Cigarette/Vaping Substances    Nicotine No     THC No     CBD No     Flavoring No     Other No     Unknown No      Social History     Tobacco Use    Smoking status: Never     Passive exposure: Never    Smokeless tobacco: Never   Vaping Use    Vaping status: Never Used   Substance Use Topics    Alcohol use: Yes     Alcohol/week: 4.0 - 5.0 standard drinks of alcohol     Types: 4 - 5 Cans of beer per week     Comment: socially    Drug use: Not Currently     Types: Marijuana     Comment: daily       Review of Systems   Constitutional:  Positive for chills, fatigue and fever.   HENT:  Positive for congestion and rhinorrhea. Negative for ear discharge, ear pain and sore throat.    Eyes:  Negative for pain and visual disturbance.   Respiratory:  Positive for cough. Negative for shortness of breath.    Cardiovascular:  Negative for chest pain and palpitations.   Gastrointestinal:  Positive for abdominal pain, diarrhea, nausea and vomiting. Negative for abdominal distention and constipation.   Genitourinary:  Negative for dysuria, frequency, hematuria and urgency.   Musculoskeletal:  Positive for myalgias. Negative for arthralgias and back pain.   Skin:  Negative for color change and rash.   Neurological:  Positive for headaches. Negative for dizziness, seizures, syncope and light-headedness.   All other systems reviewed and are negative.      Physical Exam  Physical Exam  Vitals and nursing note reviewed.   Constitutional:       General: She is awake.      Appearance: She is well-developed and overweight. She is not toxic-appearing or diaphoretic.   HENT:      Head: Normocephalic and atraumatic.       Right Ear: External ear normal.      Left Ear: External ear normal.      Nose: Congestion present. No rhinorrhea.      Mouth/Throat:      Lips: Pink.      Mouth: Mucous membranes are moist.   Eyes:      General: Lids are normal. Gaze aligned appropriately. No scleral icterus.        Right eye: No discharge.         Left eye: No discharge.      Conjunctiva/sclera: Conjunctivae normal.      Pupils: Pupils are equal, round, and reactive to light.   Cardiovascular:      Rate and Rhythm: Normal rate and regular rhythm.      Heart sounds: S1 normal and S2 normal. No murmur heard.     No friction rub. No gallop.   Pulmonary:      Effort: Pulmonary effort is normal. No respiratory distress.      Breath sounds: Normal breath sounds and air entry. No wheezing, rhonchi or rales.   Abdominal:      General: Abdomen is flat. There is no distension.      Palpations: Abdomen is soft. There is no mass.      Tenderness: There is abdominal tenderness in the epigastric area and periumbilical area. There is no guarding or rebound. Negative signs include McBurney's sign.      Hernia: No hernia is present.   Musculoskeletal:      Cervical back: Normal, full passive range of motion without pain and neck supple. No rigidity or crepitus. No spinous process tenderness or muscular tenderness.      Thoracic back: Normal. No tenderness or bony tenderness.      Lumbar back: Normal. No tenderness or bony tenderness.   Lymphadenopathy:      Cervical: No cervical adenopathy.   Skin:     General: Skin is warm.      Capillary Refill: Capillary refill takes less than 2 seconds.      Coloration: Skin is not pale.      Findings: No rash.   Neurological:      Mental Status: She is alert and oriented to person, place, and time.   Psychiatric:         Behavior: Behavior is cooperative.         Vital Signs  ED Triage Vitals   Temperature Pulse Respirations Blood Pressure SpO2   01/23/24 0301 01/23/24 0301 01/23/24 0301 01/23/24 0301 01/23/24 0301   98.4 °F  (36.9 °C) 82 20 130/92 96 %      Temp Source Heart Rate Source Patient Position - Orthostatic VS BP Location FiO2 (%)   01/23/24 0301 01/23/24 0301 01/23/24 0301 01/23/24 0301 --   Oral Monitor Sitting Left arm       Pain Score       01/23/24 0341       8           Vitals:    01/23/24 0301   BP: 130/92   Pulse: 82   Patient Position - Orthostatic VS: Sitting       ED Medications  Medications   aluminum-magnesium hydroxide-simethicone (MAALOX) oral suspension 30 mL (30 mL Oral Given 1/23/24 0339)   Lidocaine Viscous HCl (XYLOCAINE) 2 % mucosal solution 15 mL (15 mL Swish & Spit Given 1/23/24 0339)   dicyclomine (BENTYL) tablet 20 mg (20 mg Oral Given 1/23/24 0343)   ketorolac (TORADOL) injection 15 mg (15 mg Intramuscular Given 1/23/24 0341)       Diagnostic Studies  Results Reviewed       Procedure Component Value Units Date/Time    FLU/RSV/COVID - if FLU/RSV clinically relevant [[id number]]  (Normal) Collected: 01/23/24 0336    Lab Status: Final result Specimen: Nares from Nose Updated: 01/23/24 0434     SARS-CoV-2 Negative     INFLUENZA A PCR Negative     INFLUENZA B PCR Negative     RSV PCR Negative    Narrative:      FOR PEDIATRIC PATIENTS - copy/paste COVID Guidelines URL to browser: [email or web address]    SARS-CoV-2 assay is a Nucleic Acid Amplification assay intended for the  qualitative detection of nucleic acid from SARS-CoV-2 in nasopharyngeal  swabs. Results are for the presumptive identification of SARS-CoV-2 RNA.    Positive results are indicative of infection with SARS-CoV-2, the virus  causing COVID-19, but do not rule out bacterial infection or co-infection  with other viruses. Laboratories within the [place] and its  territories are required to report all positive results to the appropriate  public health authorities. Negative results do not preclude SARS-CoV-2  infection and should not be used as the sole basis for treatment or  other  patient management decisions. Negative results must be combined with  clinical observations, patient history, and epidemiological information.  This test has not been FDA cleared or approved.    This test has been authorized by FDA under an Emergency Use Authorization  (EUA). This test is only authorized for the duration of time the  declaration that circumstances exist justifying the authorization of the  emergency use of an in vitro diagnostic tests for detection of SARS-CoV-2  virus and/or diagnosis of COVID-19 infection under section 564(b)(1) of  the Act, 21 U.S.C. 360bbb-3(b)(1), unless the authorization is terminated  or revoked sooner. The test has been validated but independent review by FDA  and CLIA is pending.    Test performed using [employer] GeneXpert: This RT-PCR assay targets N2,  a region unique to SARS-CoV-2. A conserved region in the E-gene was chosen  for pan-Sarbecovirus detection which includes SARS-CoV-2.    According to CMS-2020-01-R, this platform meets the definition of high-throughput technology.                   No orders to display              Procedures  Procedures         ED Course         SBIRT 22yo+      Flowsheet Row Most Recent Value   Initial Alcohol Screen: US AUDIT-C     1. How often do you have a drink containing alcohol? 0 Filed at: 01/23/2024 0344   2. How many drinks containing alcohol do you have on a typical day you are drinking?  0 Filed at: 01/23/2024 0344   3a. Male UNDER 65: How often do you have five or more drinks on one occasion? 0 Filed at: 01/23/2024 0344   3b. FEMALE Any Age, or MALE 65+: How often do you have 4 or more drinks on one occassion? 0 Filed at: 01/23/2024 0344   Audit-C Score 0 Filed at: 01/23/2024 0344   MERY: How many times in the past year have you...    Used an illegal drug or used a prescription medication for non-medical reasons? Never Filed at: 01/23/2024 0344                Medical Decision Making  Patient presents with several days of  cold-like symptoms.  On arrival she is afebrile and nontoxic appearing.  On exam she has mild tenderness to palpation in the epigastric and periumbilical regions of the abdomen, without rebound, guarding, or rigidity.  He has nasal congestion and lungs are CTAB.  Differential diagnosis includes but is not limited to viral illness, URI, bronchitis, GERD, gastritis, gastroenteritis, colitis, or food poisoning.  Viral swab obtained and is pending.  Patient will be contacted with any positive results.  She is in agreement with the treatment plan and all questions were answered.  Strict return precautions discussed and she verbalized understanding.    Problems Addressed:  Nausea, vomiting, and diarrhea: acute illness or injury  Viral syndrome: acute illness or injury    Amount and/or Complexity of Data Reviewed  External Data Reviewed: notes.    Risk  OTC drugs.  Prescription drug management.             Disposition  Final diagnoses:   Viral syndrome   Nausea, vomiting, and diarrhea     Time reflects when diagnosis was documented in both MDM as applicable and the Disposition within this note       Time User Action Codes Description Comment    1/23/2024  3:27 AM Sharon Ruiz Add [B34.9] Viral syndrome     1/23/2024  3:27 AM Sharon Ruiz Add [R11.2,  R19.7] Nausea, vomiting, and diarrhea           ED Disposition       ED Disposition   Discharge    Condition   Stable    Date/Time   Tue Jan 23, 2024 0409    Comment   Janene Veloz discharge to home/self care.                   Follow-up Information       Follow up With Specialties Details Why Contact Mikel Cortez DO Family Medicine   [address] [id number] [phone]              Discharge Medication List as of 1/23/2024  4:11 AM        START taking these medications    Details   aluminum-magnesium hydroxide-simethicone (MAALOX MAX) 400-400-40 MG/5ML suspension Take 10 mL by mouth every 6 (six) hours as needed for  indigestion or heartburn, Starting Tue 1/23/2024, Normal      dicyclomine (BENTYL) 10 mg capsule Take 1 capsule (10 mg total) by mouth 4 (four) times a day (before meals and at bedtime) for 7 days, Starting Tue 1/23/2024, Until Tue 1/30/2024, Print      naproxen (Naprosyn) 500 mg tablet Take 1 tablet (500 mg total) by mouth 2 (two) times a day with meals, Starting Tue 1/23/2024, Normal      ondansetron (ZOFRAN) 4 mg tablet Take 1 tablet (4 mg total) by mouth every 6 (six) hours as needed for nausea or vomiting for up to 5 days, Starting Tue 1/23/2024, Until Sun 1/28/2024 at 2359, Normal             No discharge procedures on file.    PDMP Review       None            ED Provider  Electronically Signed by             Sharon Ruiz PA-C  01/23/24 6486

## 2024-01-23 NOTE — Clinical Note
Janene Veolz was seen and treated in our emergency department on 1/23/2024.                Diagnosis:     Janene  may return to work on return date.    She may return on this date: 01/25/2024         If you have any questions or concerns, please don't hesitate to call.      Sharon Ruiz PA-C    ______________________________           _______________          _______________  Hospital Representative                              Date                                Time

## 2024-01-31 ENCOUNTER — OFFICE VISIT (OUTPATIENT)
Dept: FAMILY MEDICINE CLINIC | Facility: CLINIC | Age: 34
End: 2024-01-31

## 2024-01-31 VITALS
OXYGEN SATURATION: 99 % | DIASTOLIC BLOOD PRESSURE: 82 MMHG | SYSTOLIC BLOOD PRESSURE: 124 MMHG | HEIGHT: 66 IN | HEART RATE: 87 BPM | TEMPERATURE: 97.3 F | RESPIRATION RATE: 18 BRPM | BODY MASS INDEX: 36 KG/M2 | WEIGHT: 224 LBS

## 2024-01-31 DIAGNOSIS — Z11.59 NEED FOR HEPATITIS C SCREENING TEST: ICD-10-CM

## 2024-01-31 DIAGNOSIS — Z13.220 SCREENING FOR HYPERLIPIDEMIA: ICD-10-CM

## 2024-01-31 DIAGNOSIS — Z11.4 ENCOUNTER FOR SCREENING FOR HIV: ICD-10-CM

## 2024-01-31 DIAGNOSIS — Z80.0 FAMILY HISTORY OF COLON CANCER IN FATHER: ICD-10-CM

## 2024-01-31 DIAGNOSIS — M54.6 CHRONIC BILATERAL THORACIC BACK PAIN: ICD-10-CM

## 2024-01-31 DIAGNOSIS — Z00.00 ANNUAL PHYSICAL EXAM: Primary | ICD-10-CM

## 2024-01-31 DIAGNOSIS — F31.9 BIPOLAR 1 DISORDER (HCC): ICD-10-CM

## 2024-01-31 DIAGNOSIS — R10.9 ABDOMINAL DISCOMFORT: ICD-10-CM

## 2024-01-31 DIAGNOSIS — G89.29 CHRONIC BILATERAL THORACIC BACK PAIN: ICD-10-CM

## 2024-01-31 PROBLEM — F10.21 ALCOHOLISM IN REMISSION (HCC): Status: ACTIVE | Noted: 2024-01-31

## 2024-01-31 PROBLEM — D50.0 ANEMIA DUE TO CHRONIC BLOOD LOSS: Status: RESOLVED | Noted: 2019-01-18 | Resolved: 2024-01-31

## 2024-01-31 PROBLEM — Z20.2 POSSIBLE EXPOSURE TO STD: Status: RESOLVED | Noted: 2019-01-15 | Resolved: 2024-01-31

## 2024-01-31 PROBLEM — Z72.3 INADEQUATE EXERCISE: Status: RESOLVED | Noted: 2019-01-15 | Resolved: 2024-01-31

## 2024-01-31 PROCEDURE — 99385 PREV VISIT NEW AGE 18-39: CPT | Performed by: FAMILY MEDICINE

## 2024-01-31 PROCEDURE — 99204 OFFICE O/P NEW MOD 45 MIN: CPT | Performed by: FAMILY MEDICINE

## 2024-01-31 RX ORDER — DICYCLOMINE HYDROCHLORIDE 10 MG/1
10 CAPSULE ORAL
Qty: 28 CAPSULE | Refills: 0 | Status: SHIPPED | OUTPATIENT
Start: 2024-01-31 | End: 2024-02-07

## 2024-01-31 RX ORDER — ALUMINA, MAGNESIA, AND SIMETHICONE 2400; 2400; 240 MG/30ML; MG/30ML; MG/30ML
10 SUSPENSION ORAL EVERY 6 HOURS PRN
Qty: 355 ML | Refills: 0 | Status: SHIPPED | OUTPATIENT
Start: 2024-01-31

## 2024-01-31 RX ORDER — DIVALPROEX SODIUM 500 MG/1
500 TABLET, DELAYED RELEASE ORAL EVERY 8 HOURS SCHEDULED
Qty: 30 TABLET | Refills: 2 | Status: SHIPPED | OUTPATIENT
Start: 2024-01-31

## 2024-01-31 RX ORDER — ONDANSETRON 8 MG/1
8 TABLET, ORALLY DISINTEGRATING ORAL EVERY 8 HOURS PRN
Qty: 20 TABLET | Refills: 2 | Status: SHIPPED | OUTPATIENT
Start: 2024-01-31

## 2024-01-31 NOTE — ASSESSMENT & PLAN NOTE
Controlled with bentyl, maalox and zofran.   Refills provided.   Likely needs a ppi. Ddx GERD, ulcer, biliary colic. Will hold off starting ppi until seen by gi who will possibly do a upper EGD and get hpylori biopsies.

## 2024-01-31 NOTE — PATIENT INSTRUCTIONS
[facility]   [address]   [address]   [place] [address]   Phone: [phone]   Fax: [phone]     Wellness Visit for Adults   AMBULATORY CARE:   A wellness visit  is when you see your healthcare provider to get screened for health problems. Your healthcare provider will also give you advice on how to stay healthy. Write down your questions so you remember to ask them. Ask your healthcare provider how often you should have a wellness visit.  What happens at a wellness visit:  Your healthcare provider will ask about your health, and your family history of health problems. This includes high blood pressure, heart disease, and cancer. He or she will ask if you have symptoms that concern you, if you smoke, and about your mood. You may also be asked about your intake of medicines, supplements, food, and alcohol. Any of the following may be done:  Your weight  will be checked. Your height may also be checked so your body mass index (BMI) can be calculated. Your BMI shows if you are at a healthy weight.    Your blood pressure  and heart rate will be checked. Your temperature may also be checked.    Blood and urine tests  may be done. Blood tests may be done to check your cholesterol levels. Abnormal cholesterol levels increase your risk for heart disease and stroke. You may also need a blood or urine test to check for diabetes if you are at increased risk. Urine tests may be done to look for signs of an infection or kidney disease.    A physical exam  includes checking your heartbeat and lungs with a stethoscope. Your healthcare provider may also check your skin to look for sun damage.    Screening tests  may be recommended. A screening test is done to check for diseases that may not cause symptoms. The screening tests you may need depend on your age, gender, family history, and lifestyle habits. For example, colorectal screening may be recommended if you are 50 years old or older.    Screening  tests you need if you are a woman:   A Pap smear  is used to screen for cervical cancer. Pap smears are usually done every 3 to 5 years depending on your age. You may need them more often if you have had abnormal Pap smear test results in the past. Ask your healthcare provider how often you should have a Pap smear.    A mammogram  is an x-ray of your breasts to screen for breast cancer. Experts recommend mammograms every 2 years starting at age 50 years. You may need a mammogram at age 49 years or younger if you have an increased risk for breast cancer. Talk to your healthcare provider about when you should start having mammograms and how often you need them.    Vaccines you may need:   Get an influenza vaccine  every year. The influenza vaccine protects you from the flu. Several types of viruses cause the flu. The viruses change over time, so new vaccines are made each year.    Get a tetanus-diphtheria (Td) booster vaccine  every 10 years. This vaccine protects you against tetanus and diphtheria. Tetanus is a severe infection that may cause painful muscle spasms and lockjaw. Diphtheria is a severe bacterial infection that causes a thick covering in the back of your mouth and throat.    Get a human papillomavirus (HPV) vaccine  if you are female and aged 19 to 26 or male 19 to 21 and never received it. This vaccine protects you from HPV infection. HPV is the most common infection spread by sexual contact. HPV may also cause vaginal, penile, and anal cancers.    Get a pneumococcal vaccine  if you are aged 65 years or older. The pneumococcal vaccine is an injection given to protect you from pneumococcal disease. Pneumococcal disease is an infection caused by pneumococcal bacteria. The infection may cause pneumonia, meningitis, or an ear infection.    Get a shingles vaccine  if you are 60 or older, even if you have had shingles before. The shingles vaccine is an injection to protect you from the varicella-zoster virus.  This is the same virus that causes chickenpox. Shingles is a painful rash that develops in people who had chickenpox or have been exposed to the virus.    How to eat healthy:  My Plate is a model for planning healthy meals. It shows the types and amounts of foods that should go on your plate. Fruits and vegetables make up about half of your plate, and grains and protein make up the other half. A serving of dairy is included on the side of your plate. The amount of calories and serving sizes you need depends on your age, gender, weight, and height. Examples of healthy foods are listed below:  Eat a variety of vegetables  such as dark green, red, and orange vegetables. You can also include canned vegetables low in sodium (salt) and frozen vegetables without added butter or sauces.    Eat a variety of fresh fruits , canned fruit in 100% juice, frozen fruit, and dried fruit.    Include whole grains.  At least half of the grains you eat should be whole grains. Examples include whole-wheat bread, wheat pasta, brown rice, and whole-grain cereals such as oatmeal.    Eat a variety of protein foods such as seafood (fish and shellfish), lean meat, and poultry without skin (turkey and chicken). Examples of lean meats include pork leg, shoulder, or tenderloin, and beef round, sirloin, tenderloin, and extra lean ground beef. Other protein foods include eggs and egg substitutes, beans, peas, soy products, nuts, and seeds.    Choose low-fat dairy products such as skim or 1% milk or low-fat yogurt, cheese, and cottage cheese.    Limit unhealthy fats  such as butter, hard margarine, and shortening.       Exercise:  Exercise at least 30 minutes per day on most days of the week. Some examples of exercise include walking, biking, dancing, and swimming. You can also fit in more physical activity by taking the stairs instead of the elevator or parking farther away from stores. Include muscle strengthening activities 2 days each week.  Regular exercise provides many health benefits. It helps you manage your weight, and decreases your risk for type 2 diabetes, heart disease, stroke, and high blood pressure. Exercise can also help improve your mood. Ask your healthcare provider about the best exercise plan for you.       General health and safety guidelines:   Do not smoke.  Nicotine and other chemicals in cigarettes and cigars can cause lung damage. Ask your healthcare provider for information if you currently smoke and need help to quit. E-cigarettes or smokeless tobacco still contain nicotine. Talk to your healthcare provider before you use these products.    Limit alcohol.  A drink of alcohol is 12 ounces of beer, 5 ounces of wine, or 1½ ounces of liquor.    Lose weight, if needed.  Being overweight increases your risk of certain health conditions. These include heart disease, high blood pressure, type 2 diabetes, and certain types of cancer.    Protect your skin.  Do not sunbathe or use tanning beds. Use sunscreen with a SPF 15 or higher. Apply sunscreen at least 15 minutes before you go outside. Reapply sunscreen every 2 hours. Wear protective clothing, hats, and sunglasses when you are outside.    Drive safely.  Always wear your seatbelt. Make sure everyone in your car wears a seatbelt. A seatbelt can save your life if you are in an accident. Do not use your cell phone when you are driving. This could distract you and cause an accident. Pull over if you need to make a call or send a text message.    Practice safe sex.  Use latex condoms if are sexually active and have more than one partner. Your healthcare provider may recommend screening tests for sexually transmitted infections (STIs).    Wear helmets, lifejackets, and protective gear.  Always wear a helmet when you ride a bike or motorcycle, go skiing, or play sports that could cause a head injury. Wear protective equipment when you play sports. Wear a lifejacket when you are on a boat or  doing water sports.    © Copyright [employer] 2023 Information is for End User's use only and may not be sold, redistributed or otherwise used for commercial purposes.  The above information is an  only. It is not intended as medical advice for individual conditions or treatments. Talk to your doctor, nurse or pharmacist before following any medical regimen to see if it is safe and effective for you.

## 2024-01-31 NOTE — ASSESSMENT & PLAN NOTE
Referred to gi for colon cancer screening.   2 great grandmothers  of breast cancer at an older age. Start breast cancer screening at 40.   Unsure if she has a cervix. She will check with her obgyn.   Screening hiv, hep c and lipids.

## 2024-01-31 NOTE — PROGRESS NOTES
ADULT ANNUAL PHYSICAL  [facility]    NAME: Janene Veloz  AGE: 33 y.o. SEX: female  : 1990     DATE: 2024     Assessment and Plan:     Problem List Items Addressed This Visit        Other    Annual physical exam - Primary     Referred to gi for colon cancer screening.   2 great grandmothers  of breast cancer at an older age. Start breast cancer screening at 40.   Unsure if she has a cervix. She will check with her obgyn.   Screening hiv, hep c and lipids.            Bipolar 1 disorder (HCC)     Vs type 2.   Interested in med mgmt.  Start depakote 500 and referral placed. Fu in 2m.          Relevant Medications    divalproex sodium (Depakote) 500 mg DR tablet    Other Relevant Orders    Ambulatory referral to Psych Services    Abdominal discomfort     Controlled with bentyl, maalox and zofran.   Refills provided.   Likely needs a ppi. Ddx GERD, ulcer, biliary colic. Will hold off starting ppi until seen by gi who will possibly do a upper EGD and get hpylori biopsies.          Relevant Medications    dicyclomine (BENTYL) 10 mg capsule    aluminum-magnesium hydroxide-simethicone (MAALOX MAX) 400-400-40 MG/5ML suspension    ondansetron (ZOFRAN-ODT) 8 mg disintegrating tablet    Other Relevant Orders    Ambulatory Referral to Gastroenterology   Other Visit Diagnoses     Screening for hyperlipidemia        Relevant Orders    Lipid panel    Encounter for screening for HIV        Relevant Orders    HIV 1/2 AB/AG w Reflex [facility] for 2 yr old and above    Need for hepatitis C screening test        Relevant Orders    Hepatitis C antibody    Family history of colon cancer in father        Relevant Orders    Ambulatory Referral to Gastroenterology    Chronic bilateral thoracic back pain        Relevant Orders    Ambulatory Referral to Physical Therapy          Immunizations and preventive care screenings were discussed with patient  today. Appropriate education was printed on patient's after visit summary.    Counseling:  Alcohol/drug use: discussed moderation in alcohol intake, the recommendations for healthy alcohol use, and avoidance of illicit drug use.  Dental Health: discussed importance of regular tooth brushing, flossing, and dental visits.  Exercise: the importance of regular exercise/physical activity was discussed. Recommend exercise 3-5 times per week for at least 30 minutes.          Return in 2 months (on 3/31/2024) for follow up back pain related to macromastia.     Chief Complaint:     Chief Complaint   Patient presents with   • New Patient Visit      History of Present Illness:     Adult Annual Physical   Patient here for a comprehensive physical exam. The pt switched care because this office is closer. The patient reports problems - multiple problems including back pain .  Labs reviewed from September and the fasting sugars were normal, liver enzymes normal, cbc without anemia.   Also concerned about abdominal cramping in the epigastrum and nausea in the am, while eating or after eating. Also symptoms worsen when she doesn't eat in the am. Fried food exacerbates. Symptoms last for hours. Dicyclomine bid, maalox 2-3 times a day and zofran prn helps. No unintential weight loss.   Ho bipolar disorder which was diagnosed at 16 from a hospitalization due to rubén. Previously on wellbutrin and depakote. She didn't like how it felt but it did control her symptoms. She has not been on medication since 16. She has a ho alcohol and cocaine abuse but has been sober for 2 years. She says sobriety was possible with family support and also incarceration helped with detox. Last manic episode was 6 months ago after coming out of group home.       Diet and Physical Activity  Diet/Nutrition: poor diet.   Exercise: walking.      Depression Screening  PHQ-2/9 Depression Screening    Little interest or pleasure in doing things: 3 - nearly every  day  Feeling down, depressed, or hopeless: 1 - several days  Trouble falling or staying asleep, or sleeping too much: 0 - not at all  Feeling tired or having little energy: 3 - nearly every day  Poor appetite or overeating: 3 - nearly every day  Feeling bad about yourself - or that you are a failure or have let yourself or your family down: 0 - not at all  Trouble concentrating on things, such as reading the newspaper or watching television: 0 - not at all  Moving or speaking so slowly that other people could have noticed. Or the opposite - being so fidgety or restless that you have been moving around a lot more than usual: 0 - not at all  Thoughts that you would be better off dead, or of hurting yourself in some way: 0 - not at all  PHQ-2 Score: 4  PHQ-2 Interpretation: POSITIVE depression screen  PHQ-9 Score: 10  PHQ-9 Interpretation: Moderate depression       General Health  Sleep: sleeps well.   Hearing:  no issues .  Vision: wears contacts.   Dental: no dental visits for >1 year.       /GYN Health  Follows with gynecology? yes   Last menstrual period: hysterectomy  Contraceptive method: hysterectomy  History of STDs?: no.          Review of Systems:     Review of Systems   Constitutional:  Negative for fever and unexpected weight change.   HENT:  Negative for ear pain, sore throat and trouble swallowing.    Eyes:  Negative for pain and visual disturbance.   Respiratory:  Negative for cough, chest tightness, shortness of breath and wheezing.    Cardiovascular:  Negative for chest pain.   Gastrointestinal:  Positive for abdominal pain. Negative for abdominal distention, blood in stool, constipation, diarrhea, nausea and vomiting.   Endocrine: Negative for polydipsia and polyuria.   Genitourinary:  Negative for dysuria and hematuria.   Musculoskeletal:  Positive for arthralgias and back pain. Negative for myalgias.   Skin:  Negative for rash.   Neurological:  Negative for syncope and headaches.    Psychiatric/Behavioral:  Negative for suicidal ideas.       Past Medical History:     Past Medical History:   Diagnosis Date   • Anemia due to chronic blood loss     has had recent iron infusions   • Anxiety    • Asthma    • Bipolar disorder (HCC)    • Depression    • Dry cough     pt states allergy related   • Environmental and seasonal allergies    • Fibroid    • Fibroid uterus 1/14/2019   • Food allergy     raisins   • Varicella    • Wears contact lenses       Past Surgical History:     Past Surgical History:   Procedure Laterality Date   • NO PAST SURGERIES     • GA SALPINGECTOMY COMPLETE/PARTIAL UNI/BI SPX Bilateral 1/31/2019    Procedure: SALPINGECTOMY;  Surgeon: Emilee Hernandez MD;  [facility];  Service: Gynecology   • GA TOTAL ABDOMINAL HYSTERECT W/WO RMVL TUBE OVARY N/A 1/31/2019    Procedure: HYSTERECTOMY TOTAL ABDOMINAL (ANTONIO);  Surgeon: Emilee Hernandez MD;  Location[facility];  Service: Gynecology      Social History:     Social History     Socioeconomic History   • Marital status: Single     Spouse name: Erika   • Number of children: 0   • Years of education: HS   • Highest education level: None   Occupational History   • Occupation: unemployed   Tobacco Use   • Smoking status: Never     Passive exposure: Never   • Smokeless tobacco: Never   Vaping Use   • Vaping status: Never Used   Substance and Sexual Activity   • Alcohol use: Yes     Alcohol/week: 4.0 - 5.0 standard drinks of alcohol     Types: 4 - 5 Cans of beer per week     Comment: socially   • Drug use: Not Currently     Types: Marijuana     Comment: daily   • Sexual activity: Yes     Partners: Female     Birth control/protection: Female Sterilization     Comment: lifetime sexual partners: >50; current partner: 4 years; Pt reports she has had sex with men, but has been only have sex with women since age 14   Other Topics Concern   • None   Social History Narrative    Caodaism: no preference    Accepts blood products        Exercise:  none    Calcium: occ cheese     Social Determinants of Health     Financial Resource Strain: Low Risk  (2024)    Overall Financial Resource Strain (CARDIA)    • Difficulty of Paying Living Expenses: Not hard at all   Food Insecurity: No Food Insecurity (2024)    Hunger Vital Sign    • Worried About Running Out of Food in the Last Year: Never true    • Ran Out of Food in the Last Year: Never true   Transportation Needs: No Transportation Needs (2024)    PRAPARE - Transportation    • Lack of Transportation (Medical): No    • Lack of Transportation (Non-Medical): No   Physical Activity: Not on file   Stress: Not on file   Social Connections: Not on file   Intimate Partner Violence: Not on file   Housing Stability: Not on file      Family History:     Family History   Problem Relation Age of Onset   • Colon cancer Father          age 44   • Prostate cancer Father    • Cirrhosis Father    • Alcohol abuse Father    • Sleep apnea Father    • Cancer Maternal Grandmother    • Other Mother         hysterectomy   • No Known Problems Sister    • No Known Problems Brother    • Other Paternal Grandmother          during surgery   • No Known Problems Brother    • Breast cancer Neg Hx    • Ovarian cancer Neg Hx       Current Medications:     Current Outpatient Medications   Medication Sig Dispense Refill   • aluminum-magnesium hydroxide-simethicone (MAALOX MAX) 400-400-40 MG/5ML suspension Take 10 mL by mouth every 6 (six) hours as needed for indigestion or heartburn 355 mL 0   • dicyclomine (BENTYL) 10 mg capsule Take 1 capsule (10 mg total) by mouth 4 (four) times a day (before meals and at bedtime) for 7 days 28 capsule 0   • divalproex sodium (Depakote) 500 mg DR tablet Take 1 tablet (500 mg total) by mouth every 8 (eight) hours 30 tablet 2   • ondansetron (ZOFRAN-ODT) 8 mg disintegrating tablet Take 1 tablet (8 mg total) by mouth every 8 (eight) hours as needed for nausea or vomiting 20 tablet 2   •  "naproxen (Naprosyn) 500 mg tablet Take 1 tablet (500 mg total) by mouth 2 (two) times a day with meals 30 tablet 0     No current facility-administered medications for this visit.      Allergies:     Allergies   Allergen Reactions   • Amoxicillin Tongue Swelling and Facial Swelling   • Penicillins      Unsure of reaction    • Other      raisins      Physical Exam:     /82 (BP Location: Right arm, Patient Position: Sitting, Cuff Size: Large)   Pulse 87   Temp (!) 97.3 °F (36.3 °C) (Temporal)   Resp 18   Ht 5' 6\" (1.676 m)   Wt 102 kg (224 lb)   LMP 01/26/2019 (Exact Date)   SpO2 99%   BMI 36.15 kg/m²     Physical Exam  Constitutional:       Appearance: She is well-developed.   HENT:      Head: Normocephalic and atraumatic.   Eyes:      General: No scleral icterus.     Conjunctiva/sclera: Conjunctivae normal.      Pupils: Pupils are equal, round, and reactive to light.   Cardiovascular:      Rate and Rhythm: Normal rate and regular rhythm.      Heart sounds: Normal heart sounds. No murmur heard.     No friction rub. No gallop.   Pulmonary:      Effort: Pulmonary effort is normal. No respiratory distress.      Breath sounds: No wheezing or rales.   Chest:      Chest wall: No tenderness.   Abdominal:      General: Bowel sounds are normal. There is no distension.      Palpations: Abdomen is soft. There is no mass.      Tenderness: There is no abdominal tenderness.   Musculoskeletal:         General: Normal range of motion.      Cervical back: Normal range of motion.   Skin:     General: Skin is warm and dry.      Findings: No rash.   Neurological:      Mental Status: She is alert and oriented to person, place, and time.      Cranial Nerves: No cranial nerve deficit.          Annalee Pichardo MD   [facility]    "

## 2024-02-03 NOTE — PLAN OF CARE
Problem: Knowledge Deficit  Goal: Patient/family/caregiver demonstrates understanding of disease process, treatment plan, medications, and discharge instructions  Complete learning assessment and assess knowledge base    Interventions:  - Provide teaching at level of understanding  - Provide teaching via preferred learning methods  Outcome: Progressing 03-Feb-2024 05:50

## 2024-02-16 ENCOUNTER — TELEPHONE (OUTPATIENT)
Dept: FAMILY MEDICINE CLINIC | Facility: CLINIC | Age: 34
End: 2024-02-16

## 2024-02-16 NOTE — TELEPHONE ENCOUNTER
[employer]  form received on 2/16/24  to be completed by PCP. Copy made and placed in PCP folder.    Forms to be delivered to PCP mailbox at assigned time.

## 2024-02-20 ENCOUNTER — OFFICE VISIT (OUTPATIENT)
Dept: GASTROENTEROLOGY | Facility: MEDICAL CENTER | Age: 34
End: 2024-02-20
Payer: MEDICARE

## 2024-02-20 ENCOUNTER — TELEPHONE (OUTPATIENT)
Dept: GASTROENTEROLOGY | Facility: MEDICAL CENTER | Age: 34
End: 2024-02-20

## 2024-02-20 VITALS
TEMPERATURE: 98.6 F | BODY MASS INDEX: 36.22 KG/M2 | OXYGEN SATURATION: 99 % | HEART RATE: 91 BPM | SYSTOLIC BLOOD PRESSURE: 122 MMHG | WEIGHT: 225.4 LBS | HEIGHT: 66 IN | DIASTOLIC BLOOD PRESSURE: 74 MMHG

## 2024-02-20 DIAGNOSIS — Z80.0 FAMILY HISTORY OF COLON CANCER IN FATHER: ICD-10-CM

## 2024-02-20 DIAGNOSIS — R10.9 ABDOMINAL DISCOMFORT: ICD-10-CM

## 2024-02-20 DIAGNOSIS — R10.9 ABDOMINAL PAIN: ICD-10-CM

## 2024-02-20 DIAGNOSIS — R11.0 NAUSEA: ICD-10-CM

## 2024-02-20 PROCEDURE — 99244 OFF/OP CNSLTJ NEW/EST MOD 40: CPT | Performed by: NURSE PRACTITIONER

## 2024-02-20 RX ORDER — OMEPRAZOLE 20 MG/1
20 CAPSULE, DELAYED RELEASE ORAL DAILY
Qty: 30 CAPSULE | Refills: 3 | Status: SHIPPED | OUTPATIENT
Start: 2024-02-20

## 2024-02-20 RX ORDER — POLYETHYLENE GLYCOL 3350, SODIUM CHLORIDE, SODIUM BICARBONATE, POTASSIUM CHLORIDE 420; 11.2; 5.72; 1.48 G/4L; G/4L; G/4L; G/4L
4000 POWDER, FOR SOLUTION ORAL ONCE
Qty: 4000 ML | Refills: 0 | Status: SHIPPED | OUTPATIENT
Start: 2024-02-20 | End: 2024-02-20

## 2024-02-20 RX ORDER — ALUMINA, MAGNESIA, AND SIMETHICONE 2400; 2400; 240 MG/30ML; MG/30ML; MG/30ML
10 SUSPENSION ORAL EVERY 6 HOURS PRN
Qty: 355 ML | Refills: 0 | Status: SHIPPED | OUTPATIENT
Start: 2024-02-20

## 2024-02-20 RX ORDER — ONDANSETRON 8 MG/1
8 TABLET, ORALLY DISINTEGRATING ORAL EVERY 8 HOURS PRN
Qty: 20 TABLET | Refills: 0 | Status: SHIPPED | OUTPATIENT
Start: 2024-02-20

## 2024-02-20 NOTE — TELEPHONE ENCOUNTER
Procedure: EGD/Colonoscopy  Date: 03/28/2024  Physician performing: Dr. Tenorio  [facility] of [facility]  Instructions given to patient: Yes  Diabetic: No  Clearances: N/A

## 2024-02-20 NOTE — TELEPHONE ENCOUNTER
Patient came by asking when will her form be filled out because her employer keeps asking her. I did mention the form protocol. She stated she understood but can we try and get it done.      Please advise.

## 2024-02-20 NOTE — PROGRESS NOTES
[facility] - Outpatient Consultation  Janene Veloz 34 y.o. female MRN: [id number]  Encounter: [id number]          ASSESSMENT AND PLAN:    Janene Veloz is a 34 y.o. female who presents with complaint of epigastric pain and nausea.    1.  Epigastric pain  2.  Nausea    Intermittent bouts of epigastric pain that is a burning/cramping several years ago.  It can occur before or after eating.  It is associated with some nausea but no vomiting.  It occurs almost daily for her.  Recent CT abdomen pelvis  was normal.  Recent CBC, CMP normal.  She does drink caffeine 1 cup daily and is currently using NSAIDs twice daily for the past several weeks.  Quit alcohol 7 years ago.  Did drink alcohol heavily for 4 to 5 years.  Never had any EGD.  No dysphagia.  No weight loss.  Will rule out celiac, H. pylori, PUD, gastritis, esophagitis.    -Celiac panel, stool for H. Pylori  -Antireflux diet  -Start omeprazole 20 mg daily after obtaining stool for H. Pylori  -EGD  -Follow-up in office after testing        3. Family history of colon cancer in father    BMs are brown and formed daily with occasional bouts of harder stools alternating with softer stools.  Denies any melena or hematochezia.  Father  of colon cancer at age 44.  She never had a colonoscopy.    -Colonoscopy with GoLytely/Dulcolax prep  -Fiber supplement daily  -Increase water intake to 8 cups/day    I reviewed with patient/family potential risks of endoscopic evaluation including possible infection, bleeding or perforation.  Patient/family verbalized understanding of potential risks and agreed to procedure(s).    ______________________________________________________________________    HPI:    Janene Veloz is a 34 y.o. female who presents with complaint of   .  She has a past medical history of asthma, seasonal allergies, Polar disorder, alcoholism in remission.    Started several years ago with  intermittent bouts of epigastric pain/discomfort but can occur at any time but usually worse after eating.  Some nausea associated with the pain but no vomiting.  No weight loss.  No heartburn or reflux.   CT abdomen pelvis -new acute abdominal pelvic process. Labs -CMP normal, CBC normal other than RBCs 5.37.  She has been to the ER and given dicyclomine and Zofran with some help.  Drinks 1 cup of caffeine per day.  Recently using naproxen 2 times a day.  Quit alcohol 7 years ago.  Reports she drank 8 beers per day for 4 to 5 years.  Pain does not awaken her at night.  She does smoke marijuana daily but her symptoms started before she started using marijuana.    BMs are brown and formed daily with occasional bouts of harder stools alternating with softer stools.  Denies any melena or hematochezia.  Father  of colon cancer at age 44.  She never had a colonoscopy.      REVIEW OF SYSTEMS:    CONSTITUTIONAL: Denies any fever, chills, rigors, and weight loss.  HEENT: No earache or tinnitus. Denies hearing loss or visual disturbances.  CARDIOVASCULAR: No chest pain or palpitations.   RESPIRATORY: Denies any cough, hemoptysis, shortness of breath or dyspnea on exertion.  GASTROINTESTINAL: As noted in the History of Present Illness.   GENITOURINARY: No problems with urination. Denies any hematuria or dysuria.  NEUROLOGIC: No dizziness or vertigo, denies headaches.   MUSCULOSKELETAL: Denies any muscle or joint pain.   SKIN: Denies skin rashes or itching.   ENDOCRINE: Denies excessive thirst. Denies intolerance to heat or cold.  PSYCHOSOCIAL: Denies depression or anxiety. Denies any recent memory loss.       Historical Information   Past Medical History:   Diagnosis Date   • Anemia due to chronic blood loss     has had recent iron infusions   • Anxiety    • Asthma    • Bipolar disorder (HCC)    • Depression    • Dry cough     pt states allergy related   • Environmental and seasonal allergies    • Fibroid    •  Fibroid uterus 2019   • Food allergy     raisins   • Varicella    • Wears contact lenses      Past Surgical History:   Procedure Laterality Date   • NO PAST SURGERIES     • NH SALPINGECTOMY COMPLETE/PARTIAL UNI/BI SPX Bilateral 2019    Procedure: SALPINGECTOMY;  Surgeon: Emilee Hernandez MD;  [facility];  Service: Gynecology   • NH TOTAL ABDOMINAL HYSTERECT W/WO RMVL TUBE OVARY N/A 2019    Procedure: HYSTERECTOMY TOTAL ABDOMINAL (ANTONIO);  Surgeon: Emilee Hernandez MD;  [facility];  Service: Gynecology     Social History   Social History     Substance and Sexual Activity   Alcohol Use Yes   • Alcohol/week: 4.0 - 5.0 standard drinks of alcohol   • Types: 4 - 5 Cans of beer per week    Comment: rarely     Social History     Substance and Sexual Activity   Drug Use Yes   • Types: Marijuana    Comment: daily     Social History     Tobacco Use   Smoking Status Never   • Passive exposure: Never   Smokeless Tobacco Never     Family History   Problem Relation Age of Onset   • Colon cancer Father          age 44   • Prostate cancer Father    • Cirrhosis Father    • Alcohol abuse Father    • Sleep apnea Father    • Cancer Maternal Grandmother    • Other Mother         hysterectomy   • No Known Problems Sister    • No Known Problems Brother    • Other Paternal Grandmother          during surgery   • No Known Problems Brother    • Breast cancer Neg Hx    • Ovarian cancer Neg Hx        Meds/Allergies       Current Outpatient Medications:   •  aluminum-magnesium hydroxide-simethicone (MAALOX MAX) 400-400-40 MG/5ML suspension  •  divalproex sodium (Depakote) 500 mg DR tablet  •  naproxen (Naprosyn) 500 mg tablet  •  omeprazole (PriLOSEC) 20 mg delayed release capsule  •  ondansetron (ZOFRAN-ODT) 8 mg disintegrating tablet  •  polyethylene glycol-electrolytes (NULYTELY) 4000 mL solution  •  dicyclomine (BENTYL) 10 mg capsule    Allergies   Allergen Reactions   • Amoxicillin Tongue Swelling  "and Facial Swelling   • Penicillins      Unsure of reaction    • Other      raisins           Objective     Blood pressure 122/74, pulse 91, temperature 98.6 °F (37 °C), temperature source Tympanic, height 5' 6\" (1.676 m), weight 102 kg (225 lb 6.4 oz), last menstrual period 01/26/2019, SpO2 99%, not currently breastfeeding. Body mass index is 36.38 kg/m².        PHYSICAL EXAM:      General Appearance:   Alert, cooperative, no distress   HEENT:   Normocephalic, atraumatic, anicteric.     Neck:  Supple, symmetrical, trachea midline   Lungs:   Clear to auscultation bilaterally; no rales, rhonchi or wheezing; respirations unlabored    Heart::   Regular rate and rhythm; no murmur, rub, or gallop.   Abdomen:   Soft, non-tender, non-distended; normal bowel sounds; no masses, no organomegaly    Genitalia:   Deferred    Rectal:   Deferred    Extremities:  No cyanosis, clubbing or edema    Pulses:  2+ and symmetric    Skin:  No jaundice, rashes, or lesions    Lymph nodes:  No palpable cervical lymphadenopathy        Lab Results:   No visits with results within 1 Day(s) from this visit.   Latest known visit with results is:   Admission on 01/23/2024, Discharged on 01/23/2024   Component Date Value   • SARS-CoV-2 01/23/2024 Negative    • INFLUENZA A PCR 01/23/2024 Negative    • INFLUENZA B PCR 01/23/2024 Negative    • RSV PCR 01/23/2024 Negative        Lab Results   Component Value Date    WBC 6.54 09/22/2023    HGB 14.4 09/22/2023    HCT 45.2 09/22/2023    MCV 84 09/22/2023     09/22/2023       Lab Results   Component Value Date    SODIUM 139 09/22/2023    K 4.1 09/22/2023     09/22/2023    CO2 26 09/22/2023    AGAP 6 09/22/2023    BUN 10 09/22/2023    CREATININE 0.75 09/22/2023    GLUC 92 09/22/2023    CALCIUM 8.7 09/22/2023    AST 13 09/22/2023    ALT 13 09/22/2023    ALKPHOS 50 09/22/2023    TP 6.8 09/22/2023    TBILI 0.32 09/22/2023    EGFR 103 09/22/2023       No results found for: \"CRP\"    Lab Results " "  Component Value Date    WBI1TPQSQLWE 1.580 01/17/2019    TSH 1.99 03/11/2022       No results found for: \"IRON\", \"TIBC\", \"FERRITIN\"    Radiology Results:   No results found.  "

## 2024-02-21 ENCOUNTER — APPOINTMENT (OUTPATIENT)
Dept: LAB | Facility: CLINIC | Age: 34
End: 2024-02-21
Payer: MEDICARE

## 2024-02-21 DIAGNOSIS — Z13.220 SCREENING FOR HYPERLIPIDEMIA: ICD-10-CM

## 2024-02-21 DIAGNOSIS — R10.9 ABDOMINAL PAIN: ICD-10-CM

## 2024-02-21 DIAGNOSIS — Z11.4 ENCOUNTER FOR SCREENING FOR HIV: ICD-10-CM

## 2024-02-21 DIAGNOSIS — R11.0 NAUSEA: ICD-10-CM

## 2024-02-21 DIAGNOSIS — Z11.59 NEED FOR HEPATITIS C SCREENING TEST: ICD-10-CM

## 2024-02-21 LAB
CHOLEST SERPL-MCNC: 194 MG/DL
HDLC SERPL-MCNC: 53 MG/DL
IGA SERPL-MCNC: 173 MG/DL (ref 66–433)
LDLC SERPL CALC-MCNC: 122 MG/DL (ref 0–100)
NONHDLC SERPL-MCNC: 141 MG/DL
TRIGL SERPL-MCNC: 94 MG/DL
TSH SERPL DL<=0.05 MIU/L-ACNC: 2.1 UIU/ML (ref 0.45–4.5)

## 2024-02-21 PROCEDURE — 82784 ASSAY IGA/IGD/IGG/IGM EACH: CPT

## 2024-02-21 PROCEDURE — 87389 HIV-1 AG W/HIV-1&-2 AB AG IA: CPT

## 2024-02-21 PROCEDURE — 80061 LIPID PANEL: CPT

## 2024-02-21 PROCEDURE — 36415 COLL VENOUS BLD VENIPUNCTURE: CPT

## 2024-02-21 PROCEDURE — 86364 TISS TRNSGLTMNASE EA IG CLAS: CPT

## 2024-02-21 PROCEDURE — 86803 HEPATITIS C AB TEST: CPT

## 2024-02-21 PROCEDURE — 84443 ASSAY THYROID STIM HORMONE: CPT

## 2024-02-21 NOTE — TELEPHONE ENCOUNTER
02/21/24    Pt picked-up form     Form: [employer] / Physician Statement     Form copied and scanned into pt chart

## 2024-02-22 ENCOUNTER — APPOINTMENT (OUTPATIENT)
Dept: LAB | Facility: CLINIC | Age: 34
End: 2024-02-22
Payer: MEDICARE

## 2024-02-22 DIAGNOSIS — R11.0 NAUSEA: ICD-10-CM

## 2024-02-22 DIAGNOSIS — R10.9 ABDOMINAL PAIN: ICD-10-CM

## 2024-02-22 LAB
HCV AB SER QL: NORMAL
HIV 1+2 AB+HIV1 P24 AG SERPL QL IA: NORMAL
HIV 2 AB SERPL QL IA: NORMAL
HIV1 AB SERPL QL IA: NORMAL
HIV1 P24 AG SERPL QL IA: NORMAL
TTG IGA SER-ACNC: <2 U/ML (ref 0–3)

## 2024-02-22 PROCEDURE — 87338 HPYLORI STOOL AG IA: CPT

## 2024-02-23 ENCOUNTER — TELEPHONE (OUTPATIENT)
Dept: PSYCHIATRY | Facility: CLINIC | Age: 34
End: 2024-02-23

## 2024-02-23 DIAGNOSIS — A04.8 H. PYLORI INFECTION: Primary | ICD-10-CM

## 2024-02-23 LAB — H PYLORI AG STL QL IA: POSITIVE

## 2024-02-23 RX ORDER — TETRACYCLINE HYDROCHLORIDE 500 MG/1
500 CAPSULE ORAL EVERY 6 HOURS
Qty: 56 CAPSULE | Refills: 0 | Status: SHIPPED | OUTPATIENT
Start: 2024-02-23 | End: 2024-03-08

## 2024-02-23 RX ORDER — OMEPRAZOLE 20 MG/1
20 CAPSULE, DELAYED RELEASE ORAL EVERY 12 HOURS
Qty: 28 CAPSULE | Refills: 0 | Status: SHIPPED | OUTPATIENT
Start: 2024-02-23 | End: 2024-03-08

## 2024-02-23 RX ORDER — METRONIDAZOLE 250 MG/1
250 TABLET ORAL EVERY 6 HOURS
Qty: 56 TABLET | Refills: 0 | Status: SHIPPED | OUTPATIENT
Start: 2024-02-23 | End: 2024-03-08

## 2024-02-23 RX ORDER — BISMUTH SUBSALICYLATE 262 MG/1
262 TABLET, CHEWABLE ORAL
Qty: 56 TABLET | Refills: 0 | Status: SHIPPED | OUTPATIENT
Start: 2024-02-23

## 2024-02-23 NOTE — TELEPHONE ENCOUNTER
Called pt in regards to referral rcvd, verify needs of services and inform of current wait list. Pt states is interested in talk therapy and med mgmt services. Pt added to proper wait list. Closing referral.

## 2024-03-05 DIAGNOSIS — A04.8 H. PYLORI INFECTION: ICD-10-CM

## 2024-03-05 RX ORDER — BISMUTH SUBSALICYLATE 262 MG/1
262 TABLET, CHEWABLE ORAL
Qty: 56 TABLET | Refills: 0 | Status: SHIPPED | OUTPATIENT
Start: 2024-03-05

## 2024-03-11 ENCOUNTER — APPOINTMENT (OUTPATIENT)
Dept: PHYSICAL THERAPY | Facility: MEDICAL CENTER | Age: 34
End: 2024-03-11

## 2024-03-11 ENCOUNTER — APPOINTMENT (OUTPATIENT)
Dept: URGENT CARE | Facility: MEDICAL CENTER | Age: 34
End: 2024-03-11

## 2024-03-11 PROCEDURE — 97530 THERAPEUTIC ACTIVITIES: CPT

## 2024-03-13 ENCOUNTER — PATIENT MESSAGE (OUTPATIENT)
Dept: GASTROENTEROLOGY | Facility: CLINIC | Age: 34
End: 2024-03-13

## 2024-03-14 ENCOUNTER — ANESTHESIA (OUTPATIENT)
Dept: ANESTHESIOLOGY | Facility: HOSPITAL | Age: 34
End: 2024-03-14

## 2024-03-14 ENCOUNTER — ANESTHESIA EVENT (OUTPATIENT)
Dept: ANESTHESIOLOGY | Facility: HOSPITAL | Age: 34
End: 2024-03-14

## 2024-03-26 RX ORDER — SODIUM CHLORIDE 9 MG/ML
125 INJECTION, SOLUTION INTRAVENOUS CONTINUOUS
OUTPATIENT
Start: 2024-03-26

## 2024-04-19 ENCOUNTER — TELEPHONE (OUTPATIENT)
Dept: FAMILY MEDICINE CLINIC | Facility: CLINIC | Age: 34
End: 2024-04-19

## 2024-04-19 NOTE — TELEPHONE ENCOUNTER
Pt had appt on 4/17/24 and did not show.     Spoke with pt and pt stated she would call back and reschedule appt

## 2024-05-07 ENCOUNTER — HOSPITAL ENCOUNTER (EMERGENCY)
Facility: HOSPITAL | Age: 34
Discharge: HOME | End: 2024-05-07
Attending: EMERGENCY MEDICINE

## 2024-05-07 VITALS
WEIGHT: 216.1 LBS | RESPIRATION RATE: 20 BRPM | SYSTOLIC BLOOD PRESSURE: 159 MMHG | OXYGEN SATURATION: 96 % | BODY MASS INDEX: 34.88 KG/M2 | HEART RATE: 80 BPM | TEMPERATURE: 97.6 F | DIASTOLIC BLOOD PRESSURE: 91 MMHG

## 2024-05-07 DIAGNOSIS — J06.9 UPPER RESPIRATORY TRACT INFECTION, UNSPECIFIED TYPE: Primary | ICD-10-CM

## 2024-05-07 LAB
FLUAV RNA RESP QL NAA+PROBE: NEGATIVE
FLUBV RNA RESP QL NAA+PROBE: NEGATIVE
RSV RNA RESP QL NAA+PROBE: POSITIVE
SARS-COV-2 RNA RESP QL NAA+PROBE: NEGATIVE

## 2024-05-07 PROCEDURE — 99285 EMERGENCY DEPT VISIT HI MDM: CPT

## 2024-05-07 PROCEDURE — 99284 EMERGENCY DEPT VISIT MOD MDM: CPT | Performed by: EMERGENCY MEDICINE

## 2024-05-07 PROCEDURE — 0241U HB NFCT DS VIR RESP RNA 4 TRGT: CPT | Performed by: EMERGENCY MEDICINE

## 2024-05-07 RX ORDER — FLUTICASONE PROPIONATE 50 MCG
1 SPRAY, SUSPENSION (ML) NASAL DAILY
Qty: 16 G | Refills: 0 | Status: SHIPPED | OUTPATIENT
Start: 2024-05-07

## 2024-05-07 RX ORDER — ALBUTEROL SULFATE 90 UG/1
2 AEROSOL, METERED RESPIRATORY (INHALATION) EVERY 4 HOURS PRN
Qty: 8 G | Refills: 0 | Status: SHIPPED | OUTPATIENT
Start: 2024-05-07

## 2024-05-07 NOTE — ED PROVIDER NOTES
History  Chief Complaint   Patient presents with    Cold Like Symptoms     Pt reports her kids have been diagnosis with RSV and she has not felt well since Sunday. Reports body aches, weakness, fatigue, loss of smell and taste, cough and nasal congestion.      HPI    33 yo F presents to ed for eval of cold symptoms. Ongoing symptoms for the past 3 days. Cough non productive. Meds taken PTA: nyquil. No fevers. Does have congestion. No n/v/cp/sob. No sore throat.  No trouble handling oral secretions. No change in voice. Is not boosted for COVID, not vaccinated for flu. Sick contacts: kids have similar symptoms.      Prior to Admission Medications   Prescriptions Last Dose Informant Patient Reported? Taking?   aluminum-magnesium hydroxide-simethicone (MAALOX MAX) 400-400-40 MG/5ML suspension   No No   Sig: Take 10 mL by mouth every 6 (six) hours as needed for indigestion or heartburn   bismuth subsalicylate (PEPTO BISMOL) 262 MG chewable tablet   No No   Sig: Chew 1 tablet (262 mg total) 4 (four) times a day (before meals and at bedtime)   dicyclomine (BENTYL) 10 mg capsule   No No   Sig: Take 1 capsule (10 mg total) by mouth 4 (four) times a day (before meals and at bedtime) for 7 days   divalproex sodium (Depakote) 500 mg DR tablet   No No   Sig: Take 1 tablet (500 mg total) by mouth every 8 (eight) hours   naproxen (Naprosyn) 500 mg tablet   No No   Sig: Take 1 tablet (500 mg total) by mouth 2 (two) times a day with meals   omeprazole (PriLOSEC) 20 mg delayed release capsule   No No   Sig: Take 1 capsule (20 mg total) by mouth daily   omeprazole (PriLOSEC) 20 mg delayed release capsule   No No   Sig: Take 1 capsule (20 mg total) by mouth every 12 (twelve) hours for 14 days   ondansetron (ZOFRAN-ODT) 8 mg disintegrating tablet   No No   Sig: Take 1 tablet (8 mg total) by mouth every 8 (eight) hours as needed for nausea or vomiting   polyethylene glycol-electrolytes (NULYTELY) 4000 mL solution   No No   Sig: Take  4,000 mL by mouth once for 1 dose      Facility-Administered Medications: None       Past Medical History:   Diagnosis Date    Anemia due to chronic blood loss     has had recent iron infusions    Anxiety     Asthma     Bipolar disorder (HCC)     Depression     Dry cough     pt states allergy related    Environmental and seasonal allergies     Fibroid     Fibroid uterus 2019    Food allergy     raisins    Varicella     Wears contact lenses        Past Surgical History:   Procedure Laterality Date    NO PAST SURGERIES      UT SALPINGECTOMY COMPLETE/PARTIAL UNI/BI SPX Bilateral 2019    Procedure: SALPINGECTOMY;  Surgeon: Emilee Hernandez MD;  Location: [facility];  Service: Gynecology    UT TOTAL ABDOMINAL HYSTERECT W/WO RMVL TUBE OVARY N/A 2019    Procedure: HYSTERECTOMY TOTAL ABDOMINAL (ANTONIO);  Surgeon: Emilee Hernandez MD;  Location: [facility];  Service: Gynecology       Family History   Problem Relation Age of Onset    Colon cancer Father          age 44    Prostate cancer Father     Cirrhosis Father     Alcohol abuse Father     Sleep apnea Father     Cancer Maternal Grandmother     Other Mother         hysterectomy    No Known Problems Sister     No Known Problems Brother     Other Paternal Grandmother          during surgery    No Known Problems Brother     Breast cancer Neg Hx     Ovarian cancer Neg Hx      I have reviewed and agree with the history as documented.    E-Cigarette/Vaping    E-Cigarette Use Never User      E-Cigarette/Vaping Substances    Nicotine No     THC No     CBD No     Flavoring No     Other No     Unknown No      Social History     Tobacco Use    Smoking status: Never     Passive exposure: Never    Smokeless tobacco: Never   Vaping Use    Vaping status: Never Used   Substance Use Topics    Alcohol use: Yes     Alcohol/week: 4.0 - 5.0 standard drinks of alcohol     Types: 4 - 5 Cans of beer per week     Comment: rarely    Drug use: Yes     Types: Marijuana      Comment: daily       Review of Systems   Constitutional:  Positive for fatigue. Negative for chills and fever.   HENT:  Positive for congestion. Negative for sore throat.    Eyes:  Negative for redness and visual disturbance.   Respiratory:  Positive for cough. Negative for shortness of breath.    Cardiovascular:  Negative for chest pain.   Gastrointestinal:  Negative for abdominal pain, diarrhea and nausea.   Genitourinary:  Negative for difficulty urinating, dysuria and pelvic pain.   Musculoskeletal:  Negative for back pain.   Skin:  Negative for rash.   Neurological:  Negative for syncope, weakness and headaches.   All other systems reviewed and are negative.      Physical Exam  Physical Exam  Vitals and nursing note reviewed.   Constitutional:       General: She is not in acute distress.  HENT:      Head: Normocephalic and atraumatic.      Right Ear: External ear normal.      Left Ear: External ear normal.      Mouth/Throat:      Mouth: Mucous membranes are moist.      Pharynx: No oropharyngeal exudate or posterior oropharyngeal erythema.   Eyes:      Extraocular Movements: Extraocular movements intact.      Conjunctiva/sclera: Conjunctivae normal.   Cardiovascular:      Rate and Rhythm: Normal rate and regular rhythm.      Heart sounds: Normal heart sounds.   Pulmonary:      Effort: Pulmonary effort is normal. No respiratory distress.      Breath sounds: Normal breath sounds.   Abdominal:      General: Abdomen is flat.      Tenderness: There is no abdominal tenderness.   Musculoskeletal:         General: Normal range of motion.      Cervical back: Normal range of motion.   Skin:     General: Skin is warm and dry.   Neurological:      Mental Status: She is alert and oriented to person, place, and time.      Cranial Nerves: No cranial nerve deficit.      Motor: No abnormal muscle tone.      Coordination: Coordination normal.         Vital Signs  ED Triage Vitals [05/07/24 0509]   Temperature Pulse Respirations  Blood Pressure SpO2   97.6 °F (36.4 °C) 80 20 159/91 96 %      Temp Source Heart Rate Source Patient Position - Orthostatic VS BP Location FiO2 (%)   Oral Monitor Lying Left arm --      Pain Score       --           Vitals:    05/07/24 0509   BP: 159/91   Pulse: 80   Patient Position - Orthostatic VS: Lying         Visual Acuity      ED Medications  Medications - No data to display    Diagnostic Studies  Results Reviewed       Procedure Component Value Units Date/Time    FLU/RSV/COVID - if FLU/RSV clinically relevant [[id number]]  (Abnormal) Collected: 05/07/24 0515    Lab Status: Final result Specimen: Nares from Nose Updated: 05/07/24 0556     SARS-CoV-2 Negative     INFLUENZA A PCR Negative     INFLUENZA B PCR Negative     RSV PCR Positive    Narrative:      FOR PEDIATRIC PATIENTS - copy/paste COVID Guidelines URL to browser: [email or web address]    SARS-CoV-2 assay is a Nucleic Acid Amplification assay intended for the  qualitative detection of nucleic acid from SARS-CoV-2 in nasopharyngeal  swabs. Results are for the presumptive identification of SARS-CoV-2 RNA.    Positive results are indicative of infection with SARS-CoV-2, the virus  causing COVID-19, but do not rule out bacterial infection or co-infection  with other viruses. Laboratories within the [place] and its  territories are required to report all positive results to the appropriate  public health authorities. Negative results do not preclude SARS-CoV-2  infection and should not be used as the sole basis for treatment or other  patient management decisions. Negative results must be combined with  clinical observations, patient history, and epidemiological information.  This test has not been FDA cleared or approved.    This test has been authorized by FDA under an Emergency Use Authorization  (EUA). This test is only authorized for the duration of time the  declaration that circumstances  exist justifying the authorization of the  emergency use of an in vitro diagnostic tests for detection of SARS-CoV-2  virus and/or diagnosis of COVID-19 infection under section 564(b)(1) of  the Act, 21 U.S.C. 360bbb-3(b)(1), unless the authorization is terminated  or revoked sooner. The test has been validated but independent review by FDA  and CLIA is pending.    Test performed using [employer] GeneXpert: This RT-PCR assay targets N2,  a region unique to SARS-CoV-2. A conserved region in the E-gene was chosen  for pan-Sarbecovirus detection which includes SARS-CoV-2.    According to CMS-2020-01-R, this platform meets the definition of high-throughput technology.                   No orders to display              Procedures  Procedures         ED Course                               SBIRT 22yo+      Flowsheet Row Most Recent Value   Initial Alcohol Screen: US AUDIT-C     1. How often do you have a drink containing alcohol? 0 Filed at: 05/07/2024 0511   2. How many drinks containing alcohol do you have on a typical day you are drinking?  0 Filed at: 05/07/2024 0511   3b. FEMALE Any Age, or MALE 65+: How often do you have 4 or more drinks on one occassion? 0 Filed at: 05/07/2024 0511   Audit-C Score 0 Filed at: 05/07/2024 0511   MERY: How many times in the past year have you...    Used an illegal drug or used a prescription medication for non-medical reasons? Never Filed at: 05/07/2024 0511                      Medical Decision Making  Risk  Prescription drug management.      Amount and/or Complexity of Data Reviewed  Clinical lab tests: ordered covid flu rsv   Reviewed past medical records: yes     History Provided by patient    Differential considered covid flu rsv viral uri    Testing sent, symptomatic treatments offered. Well appearing otherwise.  PCP follow up.    The patient was instructed to follow up as documented. Return precautions were provided with the patient and the patient was instructed to return to the  emergency department immediately if symptoms worsen. The patient and/or family member acknowledged and was in agreement with the plan.           Disposition  Final diagnoses:   Upper respiratory tract infection, unspecified type     Time reflects when diagnosis was documented in both MDM as applicable and the Disposition within this note       Time User Action Codes Description Comment    5/7/2024  5:41 AM AleciaryleyFred [J06.9] Upper respiratory tract infection, unspecified type           ED Disposition       ED Disposition   Discharge    Condition   Stable    Date/Time   Tue May 7, 2024 0541    Comment   Janene Schwarz Magdiel discharge to home/self care.                   Follow-up Information       Follow up With Specialties Details Why Contact Info Additional Information    [facility] Schedule an appointment as soon as possible for a visit in 1 week For follow up regarding your symptoms and recheck [address] [place] [id number] [phone] [facility] [place], [address] [place] [address], [place], [place], [address] [phone]            Discharge Medication List as of 5/7/2024  5:44 AM        START taking these medications    Details   albuterol (Proventil HFA) 90 mcg/act inhaler Inhale 2 puffs every 4 (four) hours as needed for wheezing, Starting Tue 5/7/2024, Normal      fluticasone (FLONASE) 50 mcg/act nasal spray 1 spray into each nostril daily, Starting Tue 5/7/2024, Normal           CONTINUE these medications which have NOT CHANGED    Details   aluminum-magnesium hydroxide-simethicone (MAALOX MAX) 400-400-40 MG/5ML suspension Take 10 mL by mouth every 6 (six) hours as needed for indigestion or heartburn, Starting Tue 2/20/2024, Normal      bismuth subsalicylate (PEPTO BISMOL) 262 MG chewable tablet Chew 1 tablet (262 mg total) 4 (four) times a day (before meals and at bedtime),  Starting Tue 3/5/2024, Normal      dicyclomine (BENTYL) 10 mg capsule Take 1 capsule (10 mg total) by mouth 4 (four) times a day (before meals and at bedtime) for 7 days, Starting Wed 1/31/2024, Until Wed 2/7/2024, Normal      divalproex sodium (Depakote) 500 mg DR tablet Take 1 tablet (500 mg total) by mouth every 8 (eight) hours, Starting Wed 1/31/2024, Normal      naproxen (Naprosyn) 500 mg tablet Take 1 tablet (500 mg total) by mouth 2 (two) times a day with meals, Starting Tue 1/23/2024, Normal      omeprazole (PriLOSEC) 20 mg delayed release capsule Take 1 capsule (20 mg total) by mouth daily, Starting Tue 2/20/2024, Normal      ondansetron (ZOFRAN-ODT) 8 mg disintegrating tablet Take 1 tablet (8 mg total) by mouth every 8 (eight) hours as needed for nausea or vomiting, Starting Tue 2/20/2024, Normal      polyethylene glycol-electrolytes (NULYTELY) 4000 mL solution Take 4,000 mL by mouth once for 1 dose, Starting Tue 2/20/2024, Normal             No discharge procedures on file.    PDMP Review       None            ED Provider  Electronically Signed by             Fred Moore MD  05/07/24 0091

## 2024-05-07 NOTE — Clinical Note
Janene Veloz was seen and treated in our emergency department on 5/7/2024.                Diagnosis: confidential    Janene  may return to work on return date.    She may return on this date: 05/10/2024         If you have any questions or concerns, please don't hesitate to call.      Fred Moore MD    ______________________________           _______________          _______________  Hospital Representative                              Date                                Time

## 2024-05-29 ENCOUNTER — HOSPITAL ENCOUNTER (EMERGENCY)
Facility: HOSPITAL | Age: 34
Discharge: HOME | End: 2024-05-29
Attending: EMERGENCY MEDICINE

## 2024-05-29 ENCOUNTER — APPOINTMENT (EMERGENCY)
Dept: CT IMAGING | Facility: HOSPITAL | Age: 34
End: 2024-05-29

## 2024-05-29 VITALS
HEART RATE: 55 BPM | WEIGHT: 222 LBS | BODY MASS INDEX: 35.83 KG/M2 | TEMPERATURE: 97.8 F | DIASTOLIC BLOOD PRESSURE: 83 MMHG | SYSTOLIC BLOOD PRESSURE: 146 MMHG | OXYGEN SATURATION: 97 % | RESPIRATION RATE: 16 BRPM

## 2024-05-29 DIAGNOSIS — R10.9 RIGHT FLANK PAIN: Primary | ICD-10-CM

## 2024-05-29 LAB
ALBUMIN SERPL BCP-MCNC: 3.7 G/DL (ref 3.5–5)
ALP SERPL-CCNC: 59 U/L (ref 34–104)
ALT SERPL W P-5'-P-CCNC: 10 U/L (ref 7–52)
ANION GAP SERPL CALCULATED.3IONS-SCNC: 6 MMOL/L (ref 4–13)
AST SERPL W P-5'-P-CCNC: 12 U/L (ref 13–39)
BACTERIA UR QL AUTO: NORMAL /HPF
BASOPHILS # BLD AUTO: 0.01 THOUSANDS/ÂΜL (ref 0–0.1)
BASOPHILS NFR BLD AUTO: 0 % (ref 0–1)
BILIRUB SERPL-MCNC: 0.49 MG/DL (ref 0.2–1)
BILIRUB UR QL STRIP: NEGATIVE
BUN SERPL-MCNC: 10 MG/DL (ref 5–25)
CALCIUM SERPL-MCNC: 8.8 MG/DL (ref 8.4–10.2)
CHLORIDE SERPL-SCNC: 106 MMOL/L (ref 96–108)
CLARITY UR: CLEAR
CO2 SERPL-SCNC: 25 MMOL/L (ref 21–32)
COLOR UR: YELLOW
CREAT SERPL-MCNC: 0.59 MG/DL (ref 0.6–1.3)
EOSINOPHIL # BLD AUTO: 0.21 THOUSAND/ÂΜL (ref 0–0.61)
EOSINOPHIL NFR BLD AUTO: 3 % (ref 0–6)
ERYTHROCYTE [DISTWIDTH] IN BLOOD BY AUTOMATED COUNT: 13.1 % (ref 11.6–15.1)
GFR SERPL CREATININE-BSD FRML MDRD: 118 ML/MIN/1.73SQ M
GLUCOSE SERPL-MCNC: 88 MG/DL (ref 65–140)
GLUCOSE UR STRIP-MCNC: NEGATIVE MG/DL
HCT VFR BLD AUTO: 42.4 % (ref 34.8–46.1)
HGB BLD-MCNC: 13.8 G/DL (ref 11.5–15.4)
HGB UR QL STRIP.AUTO: NEGATIVE
IMM GRANULOCYTES # BLD AUTO: 0.01 THOUSAND/UL (ref 0–0.2)
IMM GRANULOCYTES NFR BLD AUTO: 0 % (ref 0–2)
KETONES UR STRIP-MCNC: NEGATIVE MG/DL
LEUKOCYTE ESTERASE UR QL STRIP: 25
LIPASE SERPL-CCNC: 16 U/L (ref 11–82)
LYMPHOCYTES # BLD AUTO: 2.29 THOUSANDS/ÂΜL (ref 0.6–4.47)
LYMPHOCYTES NFR BLD AUTO: 28 % (ref 14–44)
MCH RBC QN AUTO: 27.2 PG (ref 26.8–34.3)
MCHC RBC AUTO-ENTMCNC: 32.5 G/DL (ref 31.4–37.4)
MCV RBC AUTO: 84 FL (ref 82–98)
MONOCYTES # BLD AUTO: 0.73 THOUSAND/ÂΜL (ref 0.17–1.22)
MONOCYTES NFR BLD AUTO: 9 % (ref 4–12)
NEUTROPHILS # BLD AUTO: 4.91 THOUSANDS/ÂΜL (ref 1.85–7.62)
NEUTS SEG NFR BLD AUTO: 60 % (ref 43–75)
NITRITE UR QL STRIP: NEGATIVE
NON-SQ EPI CELLS URNS QL MICRO: NORMAL /HPF
NRBC BLD AUTO-RTO: 0 /100 WBCS
PH UR STRIP.AUTO: 6.5 [PH]
PLATELET # BLD AUTO: 401 THOUSANDS/UL (ref 149–390)
PMV BLD AUTO: 9.2 FL (ref 8.9–12.7)
POTASSIUM SERPL-SCNC: 3.7 MMOL/L (ref 3.5–5.3)
PROT SERPL-MCNC: 6.8 G/DL (ref 6.4–8.4)
PROT UR STRIP-MCNC: NEGATIVE MG/DL
RBC # BLD AUTO: 5.08 MILLION/UL (ref 3.81–5.12)
RBC #/AREA URNS AUTO: NORMAL /HPF
SODIUM SERPL-SCNC: 137 MMOL/L (ref 135–147)
SP GR UR STRIP.AUTO: 1.01 (ref 1–1.04)
UROBILINOGEN UA: NEGATIVE MG/DL
WBC # BLD AUTO: 8.16 THOUSAND/UL (ref 4.31–10.16)
WBC #/AREA URNS AUTO: NORMAL /HPF

## 2024-05-29 PROCEDURE — 74177 CT ABD & PELVIS W/CONTRAST: CPT

## 2024-05-29 PROCEDURE — 80053 COMPREHEN METABOLIC PANEL: CPT

## 2024-05-29 PROCEDURE — 83690 ASSAY OF LIPASE: CPT | Performed by: PHYSICIAN ASSISTANT

## 2024-05-29 PROCEDURE — 96374 THER/PROPH/DIAG INJ IV PUSH: CPT

## 2024-05-29 PROCEDURE — 81001 URINALYSIS AUTO W/SCOPE: CPT

## 2024-05-29 PROCEDURE — 36415 COLL VENOUS BLD VENIPUNCTURE: CPT

## 2024-05-29 PROCEDURE — 99284 EMERGENCY DEPT VISIT MOD MDM: CPT

## 2024-05-29 PROCEDURE — 96375 TX/PRO/DX INJ NEW DRUG ADDON: CPT

## 2024-05-29 PROCEDURE — 81003 URINALYSIS AUTO W/O SCOPE: CPT

## 2024-05-29 PROCEDURE — 85025 COMPLETE CBC W/AUTO DIFF WBC: CPT

## 2024-05-29 PROCEDURE — 99284 EMERGENCY DEPT VISIT MOD MDM: CPT | Performed by: PHYSICIAN ASSISTANT

## 2024-05-29 RX ORDER — MORPHINE SULFATE 4 MG/ML
4 INJECTION, SOLUTION INTRAMUSCULAR; INTRAVENOUS ONCE
Status: COMPLETED | OUTPATIENT
Start: 2024-05-29 | End: 2024-05-29

## 2024-05-29 RX ORDER — NAPROXEN 500 MG/1
500 TABLET ORAL 2 TIMES DAILY WITH MEALS
Qty: 30 TABLET | Refills: 0 | Status: SHIPPED | OUTPATIENT
Start: 2024-05-29

## 2024-05-29 RX ORDER — ONDANSETRON 2 MG/ML
4 INJECTION INTRAMUSCULAR; INTRAVENOUS ONCE
Status: COMPLETED | OUTPATIENT
Start: 2024-05-29 | End: 2024-05-29

## 2024-05-29 RX ORDER — CYCLOBENZAPRINE HCL 10 MG
10 TABLET ORAL 2 TIMES DAILY PRN
Qty: 20 TABLET | Refills: 0 | Status: SHIPPED | OUTPATIENT
Start: 2024-05-29

## 2024-05-29 RX ORDER — LIDOCAINE 50 MG/G
1 PATCH TOPICAL DAILY
Qty: 6 PATCH | Refills: 0 | Status: SHIPPED | OUTPATIENT
Start: 2024-05-29

## 2024-05-29 RX ORDER — KETOROLAC TROMETHAMINE 30 MG/ML
15 INJECTION, SOLUTION INTRAMUSCULAR; INTRAVENOUS ONCE
Status: COMPLETED | OUTPATIENT
Start: 2024-05-29 | End: 2024-05-29

## 2024-05-29 RX ADMIN — MORPHINE SULFATE 4 MG: 4 INJECTION, SOLUTION INTRAMUSCULAR; INTRAVENOUS at 05:25

## 2024-05-29 RX ADMIN — KETOROLAC TROMETHAMINE 15 MG: 30 INJECTION, SOLUTION INTRAMUSCULAR; INTRAVENOUS at 04:43

## 2024-05-29 RX ADMIN — ONDANSETRON 4 MG: 2 INJECTION INTRAMUSCULAR; INTRAVENOUS at 04:43

## 2024-05-29 RX ADMIN — IOHEXOL 100 ML: 350 INJECTION, SOLUTION INTRAVENOUS at 05:09

## 2024-05-29 NOTE — DISCHARGE INSTRUCTIONS
Take medications as directed.  Do not drink or drive while taking muscle relaxers.  Return for new or worsening complaints.

## 2024-05-29 NOTE — Clinical Note
Janene Veloz was seen and treated in our emergency department on 5/29/2024.    No restrictions            Diagnosis:     Janene  may return to work on return date.    She may return on this date: 05/31/2024         If you have any questions or concerns, please don't hesitate to call.      Jackeline Rosales PA-C    ______________________________           _______________          _______________  Hospital Representative                              Date                                Time

## 2024-05-29 NOTE — ED NOTES
Pt is arousable, but has been sleeping ever since receiving morphine.  Resps easy and regular.  VSS.     Moose Calderón RN  05/29/24 [id number]

## 2024-05-29 NOTE — ED PROVIDER NOTES
"History  Chief Complaint   Patient presents with    Flank Pain     X1 month rt sided with nausea, denies injury, fever, chills, vomiting. \"Feels like it is throbbing\" No meds pta. States it hurts to urinate but not a burning sensation. No meds pta.      34-year-old female without significant past medical history presents complaining of intermittent right flank pain for the past month.  Patient states that it comes and goes but got worse today.  Is occasionally reproducible with movement.  Admits to some nausea without vomiting.  Denies any urinary symptoms.  Denies hematuria.  Denies fevers.  Has not taken medications prior to arrival.  Denies any other complaints.      History provided by:  Patient   used: No        Prior to Admission Medications   Prescriptions Last Dose Informant Patient Reported? Taking?   albuterol (Proventil HFA) 90 mcg/act inhaler   No No   Sig: Inhale 2 puffs every 4 (four) hours as needed for wheezing   aluminum-magnesium hydroxide-simethicone (MAALOX MAX) 400-400-40 MG/5ML suspension   No No   Sig: Take 10 mL by mouth every 6 (six) hours as needed for indigestion or heartburn   bismuth subsalicylate (PEPTO BISMOL) 262 MG chewable tablet   No No   Sig: Chew 1 tablet (262 mg total) 4 (four) times a day (before meals and at bedtime)   dicyclomine (BENTYL) 10 mg capsule   No No   Sig: Take 1 capsule (10 mg total) by mouth 4 (four) times a day (before meals and at bedtime) for 7 days   divalproex sodium (Depakote) 500 mg DR tablet   No No   Sig: Take 1 tablet (500 mg total) by mouth every 8 (eight) hours   fluticasone (FLONASE) 50 mcg/act nasal spray   No No   Si spray into each nostril daily   naproxen (Naprosyn) 500 mg tablet   No No   Sig: Take 1 tablet (500 mg total) by mouth 2 (two) times a day with meals   omeprazole (PriLOSEC) 20 mg delayed release capsule   No No   Sig: Take 1 capsule (20 mg total) by mouth daily   omeprazole (PriLOSEC) 20 mg delayed release " capsule   No No   Sig: Take 1 capsule (20 mg total) by mouth every 12 (twelve) hours for 14 days   ondansetron (ZOFRAN-ODT) 8 mg disintegrating tablet   No No   Sig: Take 1 tablet (8 mg total) by mouth every 8 (eight) hours as needed for nausea or vomiting   polyethylene glycol-electrolytes (NULYTELY) 4000 mL solution   No No   Sig: Take 4,000 mL by mouth once for 1 dose      Facility-Administered Medications: None       Past Medical History:   Diagnosis Date    Anemia due to chronic blood loss     has had recent iron infusions    Anxiety     Asthma     Bipolar disorder (HCC)     Depression     Dry cough     pt states allergy related    Environmental and seasonal allergies     Fibroid     Fibroid uterus 2019    Food allergy     raisins    Varicella     Wears contact lenses        Past Surgical History:   Procedure Laterality Date    NO PAST SURGERIES      ID SALPINGECTOMY COMPLETE/PARTIAL UNI/BI SPX Bilateral 2019    Procedure: SALPINGECTOMY;  Surgeon: Emilee Hernandez MD;  Location: [facility];  Service: Gynecology    ID TOTAL ABDOMINAL HYSTERECT W/WO RMVL TUBE OVARY N/A 2019    Procedure: HYSTERECTOMY TOTAL ABDOMINAL (ANTONIO);  Surgeon: Emilee Hernandez MD;  Location: [facility];  Service: Gynecology       Family History   Problem Relation Age of Onset    Colon cancer Father          age 44    Prostate cancer Father     Cirrhosis Father     Alcohol abuse Father     Sleep apnea Father     Cancer Maternal Grandmother     Other Mother         hysterectomy    No Known Problems Sister     No Known Problems Brother     Other Paternal Grandmother          during surgery    No Known Problems Brother     Breast cancer Neg Hx     Ovarian cancer Neg Hx      I have reviewed and agree with the history as documented.    E-Cigarette/Vaping    E-Cigarette Use Never User      E-Cigarette/Vaping Substances    Nicotine No     THC No     CBD No     Flavoring No     Other No     Unknown No      Social History      Tobacco Use    Smoking status: Never     Passive exposure: Never    Smokeless tobacco: Never   Vaping Use    Vaping status: Never Used   Substance Use Topics    Alcohol use: Yes     Alcohol/week: 4.0 - 5.0 standard drinks of alcohol     Types: 4 - 5 Cans of beer per week     Comment: rarely    Drug use: Yes     Types: Marijuana     Comment: daily       Review of Systems   Constitutional: Negative.  Negative for chills and fatigue.   HENT:  Negative for ear pain and sore throat.    Eyes:  Negative for photophobia and redness.   Respiratory:  Negative for apnea, cough and shortness of breath.    Cardiovascular:  Negative for chest pain.   Gastrointestinal:  Negative for abdominal pain, nausea and vomiting.   Genitourinary:  Positive for flank pain. Negative for dysuria.   Musculoskeletal:  Negative for arthralgias, neck pain and neck stiffness.   Skin:  Negative for rash.   Neurological:  Negative for dizziness, tremors, syncope and weakness.   Psychiatric/Behavioral:  Negative for suicidal ideas.        Physical Exam  Physical Exam  Constitutional:       General: She is not in acute distress.     Appearance: She is well-developed. She is not diaphoretic.   Eyes:      Pupils: Pupils are equal, round, and reactive to light.   Cardiovascular:      Rate and Rhythm: Normal rate and regular rhythm.   Pulmonary:      Effort: Pulmonary effort is normal. No respiratory distress.      Breath sounds: Normal breath sounds.   Abdominal:      General: Bowel sounds are normal. There is no distension.      Palpations: Abdomen is soft.      Tenderness: There is no abdominal tenderness. There is right CVA tenderness. There is no guarding.      Comments: Mild right-sided CVA tenderness.  Pain reproducible with motion.  No ecchymosis noted at the flank.   Musculoskeletal:         General: Normal range of motion.      Cervical back: Normal range of motion and neck supple.   Skin:     General: Skin is warm and dry.   Neurological:       Mental Status: She is alert and oriented to person, place, and time.         Vital Signs  ED Triage Vitals [05/29/24 0414]   Temperature Pulse Respirations Blood Pressure SpO2   97.8 °F (36.6 °C) 82 20 134/86 99 %      Temp Source Heart Rate Source Patient Position - Orthostatic VS BP Location FiO2 (%)   Oral Monitor Lying Left arm --      Pain Score       --           Vitals:    05/29/24 0414 05/29/24 0528 05/29/24 0600   BP: 134/86 158/87 146/83   Pulse: 82 58 55   Patient Position - Orthostatic VS: Lying           Visual Acuity      ED Medications  Medications   ketorolac (TORADOL) injection 15 mg (15 mg Intravenous Given 5/29/24 0443)   ondansetron (ZOFRAN) injection 4 mg (4 mg Intravenous Given 5/29/24 0443)   iohexol (OMNIPAQUE) 350 MG/ML injection (SINGLE-DOSE) 100 mL (100 mL Intravenous Given 5/29/24 0509)   morphine injection 4 mg (4 mg Intravenous Given 5/29/24 0525)       Diagnostic Studies  Results Reviewed       Procedure Component Value Units Date/Time    Lipase [[id number]]  (Normal) Collected: 05/29/24 0428    Lab Status: Final result Specimen: Blood from Line, Venous Updated: 05/29/24 0512     Lipase 16 u/L     Comprehensive metabolic panel [[id number]]  (Abnormal) Collected: 05/29/24 0428    Lab Status: Final result Specimen: Blood from Line, Venous Updated: 05/29/24 0503     Sodium 137 mmol/L      Potassium 3.7 mmol/L      Chloride 106 mmol/L      CO2 25 mmol/L      ANION GAP 6 mmol/L      BUN 10 mg/dL      Creatinine 0.59 mg/dL      Glucose 88 mg/dL      Calcium 8.8 mg/dL      AST 12 U/L      ALT 10 U/L      Alkaline Phosphatase 59 U/L      Total Protein 6.8 g/dL      Albumin 3.7 g/dL      Total Bilirubin 0.49 mg/dL      eGFR 118 ml/min/1.73sq m     Narrative:      National Kidney Disease Foundation guidelines for Chronic Kidney Disease (CKD):     Stage 1 with normal or high GFR (GFR > 90 mL/min/1.73 square meters)    Stage 2 Mild CKD (GFR = 60-89 mL/min/1.73 square meters)    Stage 3A Moderate  CKD (GFR = 45-59 mL/min/1.73 square meters)    Stage 3B Moderate CKD (GFR = 30-44 mL/min/1.73 square meters)    Stage 4 Severe CKD (GFR = 15-29 mL/min/1.73 square meters)    Stage 5 End Stage CKD (GFR <15 mL/min/1.73 square meters)  Note: GFR calculation is accurate only with a steady state creatinine    Urine Microscopic [[id number]]  (Normal) Collected: 05/29/24 0428    Lab Status: Final result Specimen: Urine, Clean Catch Updated: 05/29/24 0446     RBC, UA None Seen /hpf      WBC, UA 1-2 /hpf      Epithelial Cells Occasional /hpf      Bacteria, UA Occasional /hpf     UA w Reflex to Microscopic w Reflex to Culture [[id number]]  (Abnormal) Collected: 05/29/24 0428    Lab Status: Final result Specimen: Urine, Clean Catch Updated: 05/29/24 0444     Color, UA Yellow     Clarity, UA Clear     Specific Gravity, UA 1.015     pH, UA 6.5     Leukocytes, UA 25.0     Nitrite, UA Negative     Protein, UA Negative mg/dl      Glucose, UA Negative mg/dl      Ketones, UA Negative mg/dl      Bilirubin, UA Negative     Occult Blood, UA Negative     UROBILINOGEN UA Negative mg/dL     CBC and differential [[id number]]  (Abnormal) Collected: 05/29/24 0428    Lab Status: Final result Specimen: Blood from Line, Venous Updated: 05/29/24 0438     WBC 8.16 Thousand/uL      RBC 5.08 Million/uL      Hemoglobin 13.8 g/dL      Hematocrit 42.4 %      MCV 84 fL      MCH 27.2 pg      MCHC 32.5 g/dL      RDW 13.1 %      MPV 9.2 fL      Platelets 401 Thousands/uL      nRBC 0 /100 WBCs      Segmented % 60 %      Immature Grans % 0 %      Lymphocytes % 28 %      Monocytes % 9 %      Eosinophils Relative 3 %      Basophils Relative 0 %      Absolute Neutrophils 4.91 Thousands/µL      Absolute Immature Grans 0.01 Thousand/uL      Absolute Lymphocytes 2.29 Thousands/µL      Absolute Monocytes 0.73 Thousand/µL      Eosinophils Absolute 0.21 Thousand/µL      Basophils Absolute 0.01 Thousands/µL                    CT abdomen pelvis w contrast   Final Result  by Berhane Jones MD (05/29 0554)      No incompletely distended, the urinary bladder appears mildly thick-walled. Correlate for clinical and laboratory signs of cystitis.      Otherwise, no acute abnormality in the abdomen or pelvis to account for the patient's symptoms.         Workstation performed: [id number]                    Procedures  Procedures         ED Course                       PERC Rule for PE      Flowsheet Row Most Recent Value   PERC Rule for PE    Age >=50 0 Filed at: 05/29/2024 0605   HR >=100 0 Filed at: 05/29/2024 0605   O2 Sat on room air < 95% 0 Filed at: 05/29/2024 0605   History of PE or DVT 0 Filed at: 05/29/2024 0605   Recent trauma or surgery 0 Filed at: 05/29/2024 0605   Hemoptysis 0 Filed at: 05/29/2024 0605   Exogenous estrogen 0 Filed at: 05/29/2024 0605   Unilateral leg swelling 0 Filed at: 05/29/2024 0605   PERC Rule for PE Results 0 Filed at: 05/29/2024 0605                SBIRT 22yo+      Flowsheet Row Most Recent Value   Initial Alcohol Screen: US AUDIT-C     1. How often do you have a drink containing alcohol? 0 Filed at: 05/29/2024 0413   2. How many drinks containing alcohol do you have on a typical day you are drinking?  0 Filed at: 05/29/2024 0413   3b. FEMALE Any Age, or MALE 65+: How often do you have 4 or more drinks on one occassion? 0 Filed at: 05/29/2024 0413   Audit-C Score 0 Filed at: 05/29/2024 0413   MERY: How many times in the past year have you...    Used an illegal drug or used a prescription medication for non-medical reasons? Never Filed at: 05/29/2024 0413                      Medical Decision Making  Patient presenting complaining of atraumatic right flank pain.  This was occasionally reproducible with movement on exam.  Patient denied any painful breathing or feeling short of breath.  PERC score is 0.  Denied any urinary symptoms.  Patient described the pain as a throbbing sensation.  CAT scan was performed without focal abnormality.  Patient had  significant relief with medications given in the emergency department.  Unclear etiology of symptoms however possibly musculoskeletal in nature given reproducibility on exam.  Patient was given medications for supportive care.  Educated on return precautions.  Given primary care follow-up and ambulated out of the department.    Amount and/or Complexity of Data Reviewed  Labs: ordered.  Radiology: ordered.    Risk  Prescription drug management.             Disposition  Final diagnoses:   Right flank pain     Time reflects when diagnosis was documented in both MDM as applicable and the Disposition within this note       Time User Action Codes Description Comment    5/29/2024  6:12 AM Jackeline Rosales Add [R10.9] Right flank pain           ED Disposition       ED Disposition   Discharge    Condition   Stable    Date/Time   Wed May 29, 2024 0612    Comment   Janenemor Veloz discharge to home/self care.                   Follow-up Information       Follow up With Specialties Details Why Contact Info Additional Information    [facility] Emergency Medicine Go to  If symptoms worsen [address] [facility] [id number] [phone] [facility]    [facility] Family Medicine Call  As needed [address] [place] [id number] [phone] [facility], [address] [place] [address], [address] [phone]            Patient's Medications   Discharge Prescriptions    CYCLOBENZAPRINE (FLEXERIL) 10 MG TABLET    Take 1 tablet (10 mg total) by mouth 2 (two) times a day as needed for muscle spasms       Start Date: 5/29/2024 End Date: --       Order Dose: 10 mg       Quantity: 20 tablet    Refills: 0    LIDOCAINE (LIDODERM) 5 %    Apply 1 patch topically over 12 hours daily Remove & Discard patch within 12 hours  or as directed by MD       Start Date: 5/29/2024 End Date: --       Order Dose: 1 patch       Quantity: 6 patch    Refills: 0    NAPROXEN (NAPROSYN) 500 MG TABLET    Take 1 tablet (500 mg total) by mouth 2 (two) times a day with meals       Start Date: 5/29/2024 End Date: --       Order Dose: 500 mg       Quantity: 30 tablet    Refills: 0       No discharge procedures on file.    PDMP Review       None            ED Provider  Electronically Signed by             Jackeline Rosales PA-C  05/29/24 0641

## 2024-08-27 ENCOUNTER — TELEPHONE (OUTPATIENT)
Age: 34
End: 2024-08-27

## 2024-08-27 NOTE — TELEPHONE ENCOUNTER
"Behavioral Health Outpatient Intake Questions    Referred By   : PCP    Please advise interviewee that they need to answer all questions truthfully to allow for best care, and any misrepresentations of information may affect their ability to be seen at this clinic   => Was this discussed? Yes       Behavioral Health Outpatient Intake History -     Presenting Problem (in patient's own words):   Mental Health, Depression and Anxiety    Are there any communication barriers for this patient?     No                                           Are you taking any psychiatric medications? No       Has the Patient previously received outpatient Talk Therapy or Medication Management from [facility]   Used to take Depakoyte    Has the Patient abused alcohol or other substances in the last 6 months ? No        Legal History-     Is this treatment court ordered? No       Has the Patient been convicted of a felony?  Yes - 2019  Retail Theft    ACCEPTED as a patient yes  If \"Yes\" Appointment Date: 12/2 at 10:30      Name of Insurance Co:[employer]  Insurance ID#  [id number]  Insurance Phone #  If ins is primary or secondary? Primary    "

## 2024-09-04 ENCOUNTER — TELEPHONE (OUTPATIENT)
Dept: PSYCHIATRY | Facility: CLINIC | Age: 34
End: 2024-09-04

## 2024-09-04 NOTE — TELEPHONE ENCOUNTER
One week follow up call for New Patient appointment with   Elodia De MD   on 12/02/2024 was made on 09/04/2024. Writer was not able to inform patient of New Patient paperwork needing to be completed 5 days prior to the appointment. Writer was not able to confirm that paperwork has been sent via [employer].    Appointment was made on: 08/27/2024

## 2024-09-07 ENCOUNTER — HOSPITAL ENCOUNTER (EMERGENCY)
Facility: HOSPITAL | Age: 34
Discharge: HOME | End: 2024-09-07
Attending: EMERGENCY MEDICINE | Admitting: EMERGENCY MEDICINE
Payer: MEDICARE

## 2024-09-07 VITALS
RESPIRATION RATE: 18 BRPM | HEART RATE: 90 BPM | WEIGHT: 230.6 LBS | SYSTOLIC BLOOD PRESSURE: 140 MMHG | TEMPERATURE: 98.1 F | BODY MASS INDEX: 37.22 KG/M2 | OXYGEN SATURATION: 93 % | DIASTOLIC BLOOD PRESSURE: 80 MMHG

## 2024-09-07 DIAGNOSIS — R60.0 LOWER EXTREMITY EDEMA: Primary | ICD-10-CM

## 2024-09-07 LAB
ALBUMIN SERPL BCG-MCNC: 3.4 G/DL (ref 3.5–5)
ALP SERPL-CCNC: 47 U/L (ref 34–104)
ALT SERPL W P-5'-P-CCNC: 15 U/L (ref 7–52)
ANION GAP SERPL CALCULATED.3IONS-SCNC: 7 MMOL/L (ref 4–13)
AST SERPL W P-5'-P-CCNC: 12 U/L (ref 13–39)
BASOPHILS # BLD AUTO: 0.03 THOUSANDS/ÂΜL (ref 0–0.1)
BASOPHILS NFR BLD AUTO: 0 % (ref 0–1)
BILIRUB SERPL-MCNC: 0.2 MG/DL (ref 0.2–1)
BNP SERPL-MCNC: 11 PG/ML (ref 0–100)
BUN SERPL-MCNC: 8 MG/DL (ref 5–25)
CALCIUM ALBUM COR SERPL-MCNC: 8.4 MG/DL (ref 8.3–10.1)
CALCIUM SERPL-MCNC: 7.9 MG/DL (ref 8.4–10.2)
CHLORIDE SERPL-SCNC: 107 MMOL/L (ref 96–108)
CO2 SERPL-SCNC: 25 MMOL/L (ref 21–32)
CREAT SERPL-MCNC: 0.56 MG/DL (ref 0.6–1.3)
EOSINOPHIL # BLD AUTO: 0.22 THOUSAND/ÂΜL (ref 0–0.61)
EOSINOPHIL NFR BLD AUTO: 3 % (ref 0–6)
ERYTHROCYTE [DISTWIDTH] IN BLOOD BY AUTOMATED COUNT: 14 % (ref 11.6–15.1)
GFR SERPL CREATININE-BSD FRML MDRD: 120 ML/MIN/1.73SQ M
GLUCOSE SERPL-MCNC: 91 MG/DL (ref 65–140)
HCT VFR BLD AUTO: 42.6 % (ref 34.8–46.1)
HGB BLD-MCNC: 14 G/DL (ref 11.5–15.4)
IMM GRANULOCYTES # BLD AUTO: 0.03 THOUSAND/UL (ref 0–0.2)
IMM GRANULOCYTES NFR BLD AUTO: 0 % (ref 0–2)
LYMPHOCYTES # BLD AUTO: 2.5 THOUSANDS/ÂΜL (ref 0.6–4.47)
LYMPHOCYTES NFR BLD AUTO: 36 % (ref 14–44)
MCH RBC QN AUTO: 27.9 PG (ref 26.8–34.3)
MCHC RBC AUTO-ENTMCNC: 32.9 G/DL (ref 31.4–37.4)
MCV RBC AUTO: 85 FL (ref 82–98)
MONOCYTES # BLD AUTO: 0.56 THOUSAND/ÂΜL (ref 0.17–1.22)
MONOCYTES NFR BLD AUTO: 8 % (ref 4–12)
NEUTROPHILS # BLD AUTO: 3.54 THOUSANDS/ÂΜL (ref 1.85–7.62)
NEUTS SEG NFR BLD AUTO: 53 % (ref 43–75)
NRBC BLD AUTO-RTO: 0 /100 WBCS
PLATELET # BLD AUTO: 330 THOUSANDS/UL (ref 149–390)
PMV BLD AUTO: 8.6 FL (ref 8.9–12.7)
POTASSIUM SERPL-SCNC: 3.9 MMOL/L (ref 3.5–5.3)
PROT SERPL-MCNC: 5.8 G/DL (ref 6.4–8.4)
RBC # BLD AUTO: 5.01 MILLION/UL (ref 3.81–5.12)
SODIUM SERPL-SCNC: 139 MMOL/L (ref 135–147)
WBC # BLD AUTO: 6.88 THOUSAND/UL (ref 4.31–10.16)

## 2024-09-07 PROCEDURE — 36415 COLL VENOUS BLD VENIPUNCTURE: CPT | Performed by: EMERGENCY MEDICINE

## 2024-09-07 PROCEDURE — 99284 EMERGENCY DEPT VISIT MOD MDM: CPT | Performed by: EMERGENCY MEDICINE

## 2024-09-07 PROCEDURE — 80053 COMPREHEN METABOLIC PANEL: CPT | Performed by: EMERGENCY MEDICINE

## 2024-09-07 PROCEDURE — 99283 EMERGENCY DEPT VISIT LOW MDM: CPT

## 2024-09-07 PROCEDURE — 83880 ASSAY OF NATRIURETIC PEPTIDE: CPT | Performed by: EMERGENCY MEDICINE

## 2024-09-07 PROCEDURE — 85025 COMPLETE CBC W/AUTO DIFF WBC: CPT | Performed by: EMERGENCY MEDICINE

## 2024-09-07 NOTE — Clinical Note
Janene Veloz was seen and treated in our emergency department on 9/7/2024.                Diagnosis:     Janene  .    She may return on this date: 09/10/2024         If you have any questions or concerns, please don't hesitate to call.      Lizandro Morley, DO    ______________________________           _______________          _______________  Hospital Representative                              Date                                Time

## 2024-09-07 NOTE — ED PROVIDER NOTES
History  Chief Complaint   Patient presents with    Leg Swelling     B/L leg swelling/ pain x3 days. No meds pta.      34 year old female, concern for b/l leg swelling. Intermittent over several weeks. No chest pain or sob. No hx of heart disease. No hx of PE or DVT.           Prior to Admission Medications   Prescriptions Last Dose Informant Patient Reported? Taking?   albuterol (Proventil HFA) 90 mcg/act inhaler   No No   Sig: Inhale 2 puffs every 4 (four) hours as needed for wheezing   aluminum-magnesium hydroxide-simethicone (MAALOX MAX) 400-400-40 MG/5ML suspension   No No   Sig: Take 10 mL by mouth every 6 (six) hours as needed for indigestion or heartburn   bismuth subsalicylate (PEPTO BISMOL) 262 MG chewable tablet   No No   Sig: Chew 1 tablet (262 mg total) 4 (four) times a day (before meals and at bedtime)   cyclobenzaprine (FLEXERIL) 10 mg tablet   No No   Sig: Take 1 tablet (10 mg total) by mouth 2 (two) times a day as needed for muscle spasms   dicyclomine (BENTYL) 10 mg capsule   No No   Sig: Take 1 capsule (10 mg total) by mouth 4 (four) times a day (before meals and at bedtime) for 7 days   divalproex sodium (Depakote) 500 mg DR tablet   No No   Sig: Take 1 tablet (500 mg total) by mouth every 8 (eight) hours   fluticasone (FLONASE) 50 mcg/act nasal spray   No No   Si spray into each nostril daily   lidocaine (Lidoderm) 5 %   No No   Sig: Apply 1 patch topically over 12 hours daily Remove & Discard patch within 12 hours or as directed by MD   naproxen (Naprosyn) 500 mg tablet   No No   Sig: Take 1 tablet (500 mg total) by mouth 2 (two) times a day with meals   naproxen (Naprosyn) 500 mg tablet   No No   Sig: Take 1 tablet (500 mg total) by mouth 2 (two) times a day with meals   omeprazole (PriLOSEC) 20 mg delayed release capsule   No No   Sig: Take 1 capsule (20 mg total) by mouth daily   omeprazole (PriLOSEC) 20 mg delayed release capsule   No No   Sig: Take 1 capsule (20 mg total) by mouth  every 12 (twelve) hours for 14 days   ondansetron (ZOFRAN-ODT) 8 mg disintegrating tablet   No No   Sig: Take 1 tablet (8 mg total) by mouth every 8 (eight) hours as needed for nausea or vomiting   polyethylene glycol-electrolytes (NULYTELY) 4000 mL solution   No No   Sig: Take 4,000 mL by mouth once for 1 dose      Facility-Administered Medications: None       Past Medical History:   Diagnosis Date    Anemia due to chronic blood loss     has had recent iron infusions    Anxiety     Asthma     Bipolar disorder (HCC)     Depression     Dry cough     pt states allergy related    Environmental and seasonal allergies     Fibroid     Fibroid uterus 2019    Food allergy     raisins    Varicella     Wears contact lenses        Past Surgical History:   Procedure Laterality Date    NO PAST SURGERIES      TN SALPINGECTOMY COMPLETE/PARTIAL UNI/BI SPX Bilateral 2019    Procedure: SALPINGECTOMY;  Surgeon: Emilee Hernandez MD;  Location: [facility];  Service: Gynecology    TN TOTAL ABDOMINAL HYSTERECT W/WO RMVL TUBE OVARY N/A 2019    Procedure: HYSTERECTOMY TOTAL ABDOMINAL (ANTONIO);  Surgeon: Emilee Hernandez MD;  Location: [facility];  Service: Gynecology       Family History   Problem Relation Age of Onset    Colon cancer Father          age 44    Prostate cancer Father     Cirrhosis Father     Alcohol abuse Father     Sleep apnea Father     Cancer Maternal Grandmother     Other Mother         hysterectomy    No Known Problems Sister     No Known Problems Brother     Other Paternal Grandmother          during surgery    No Known Problems Brother     Breast cancer Neg Hx     Ovarian cancer Neg Hx      I have reviewed and agree with the history as documented.    E-Cigarette/Vaping    E-Cigarette Use Never User      E-Cigarette/Vaping Substances    Nicotine No     THC No     CBD No     Flavoring No     Other No     Unknown No      Social History     Tobacco Use    Smoking status: Never     Passive exposure:  Never    Smokeless tobacco: Never   Vaping Use    Vaping status: Never Used   Substance Use Topics    Alcohol use: Yes     Alcohol/week: 4.0 - 5.0 standard drinks of alcohol     Types: 4 - 5 Cans of beer per week     Comment: rarely    Drug use: Yes     Types: Marijuana     Comment: daily       Review of Systems   Constitutional: Negative.  Negative for chills and fever.   HENT: Negative.  Negative for rhinorrhea, sore throat, trouble swallowing and voice change.    Eyes: Negative.  Negative for pain and visual disturbance.   Respiratory: Negative.  Negative for cough, shortness of breath and wheezing.    Cardiovascular:  Positive for leg swelling. Negative for chest pain and palpitations.   Gastrointestinal:  Negative for abdominal pain, diarrhea, nausea and vomiting.   Genitourinary: Negative.  Negative for dysuria and frequency.   Musculoskeletal: Negative.  Negative for neck pain and neck stiffness.   Skin: Negative.  Negative for rash.   Neurological: Negative.  Negative for dizziness, speech difficulty, weakness, light-headedness and numbness.       Physical Exam  Physical Exam  Vitals and nursing note reviewed.   Constitutional:       General: She is not in acute distress.     Appearance: She is well-developed.   HENT:      Head: Normocephalic and atraumatic.   Eyes:      Conjunctiva/sclera: Conjunctivae normal.      Pupils: Pupils are equal, round, and reactive to light.   Neck:      Trachea: No tracheal deviation.   Cardiovascular:      Rate and Rhythm: Normal rate and regular rhythm.   Pulmonary:      Effort: Pulmonary effort is normal. No respiratory distress.      Breath sounds: Normal breath sounds. No wheezing or rales.   Abdominal:      General: Bowel sounds are normal. There is no distension.      Palpations: Abdomen is soft.      Tenderness: There is no abdominal tenderness. There is no guarding or rebound.   Musculoskeletal:         General: No tenderness or deformity. Normal range of motion.       Cervical back: Normal range of motion and neck supple.      Right lower le+ Pitting Edema present.      Left lower le+ Pitting Edema present.   Skin:     General: Skin is warm and dry.      Capillary Refill: Capillary refill takes less than 2 seconds.      Findings: No rash.   Neurological:      Mental Status: She is alert and oriented to person, place, and time.   Psychiatric:         Behavior: Behavior normal.         Vital Signs  ED Triage Vitals   Temperature Pulse Respirations Blood Pressure SpO2   24 0724 0710 09/07/24 0705   98.1 °F (36.7 °C) 90 18 140/80 93 %      Temp Source Heart Rate Source Patient Position - Orthostatic VS BP Location FiO2 (%)   24 --   Oral Monitor Lying Left arm       Pain Score       24       8           Vitals:    24   BP:  140/80   Pulse: 90    Patient Position - Orthostatic VS: Lying          Visual Acuity      ED Medications  Medications - No data to display    Diagnostic Studies  Results Reviewed       Procedure Component Value Units Date/Time    Comprehensive metabolic panel [[id number]]  (Abnormal) Collected: 24    Lab Status: Final result Specimen: Blood from Arm, Left Updated: 24     Sodium 139 mmol/L      Potassium 3.9 mmol/L      Chloride 107 mmol/L      CO2 25 mmol/L      ANION GAP 7 mmol/L      BUN 8 mg/dL      Creatinine 0.56 mg/dL      Glucose 91 mg/dL      Calcium 7.9 mg/dL      Corrected Calcium 8.4 mg/dL      AST 12 U/L      ALT 15 U/L      Alkaline Phosphatase 47 U/L      Total Protein 5.8 g/dL      Albumin 3.4 g/dL      Total Bilirubin 0.20 mg/dL      eGFR 120 ml/min/1.73sq m     Narrative:      National Kidney Disease Foundation guidelines for Chronic Kidney Disease (CKD):     Stage 1 with normal or high GFR (GFR > 90 mL/min/1.73 square meters)    Stage 2 Mild CKD (GFR = 60-89 mL/min/1.73 square meters)     Stage 3A Moderate CKD (GFR = 45-59 mL/min/1.73 square meters)    Stage 3B Moderate CKD (GFR = 30-44 mL/min/1.73 square meters)    Stage 4 Severe CKD (GFR = 15-29 mL/min/1.73 square meters)    Stage 5 End Stage CKD (GFR <15 mL/min/1.73 square meters)  Note: GFR calculation is accurate only with a steady state creatinine    B-Type Natriuretic Peptide(BNP) [[id number]]  (Normal) Collected: 09/07/24 0745    Lab Status: Final result Specimen: Blood from Arm, Left Updated: 09/07/24 0823     BNP 11 pg/mL     CBC and differential [[id number]]  (Abnormal) Collected: 09/07/24 0745    Lab Status: Final result Specimen: Blood from Arm, Left Updated: 09/07/24 0759     WBC 6.88 Thousand/uL      RBC 5.01 Million/uL      Hemoglobin 14.0 g/dL      Hematocrit 42.6 %      MCV 85 fL      MCH 27.9 pg      MCHC 32.9 g/dL      RDW 14.0 %      MPV 8.6 fL      Platelets 330 Thousands/uL      nRBC 0 /100 WBCs      Segmented % 53 %      Immature Grans % 0 %      Lymphocytes % 36 %      Monocytes % 8 %      Eosinophils Relative 3 %      Basophils Relative 0 %      Absolute Neutrophils 3.54 Thousands/µL      Absolute Immature Grans 0.03 Thousand/uL      Absolute Lymphocytes 2.50 Thousands/µL      Absolute Monocytes 0.56 Thousand/µL      Eosinophils Absolute 0.22 Thousand/µL      Basophils Absolute 0.03 Thousands/µL                    No orders to display              Procedures  Procedures         ED Course                                 SBIRT 20yo+      Flowsheet Row Most Recent Value   Initial Alcohol Screen: US AUDIT-C     1. How often do you have a drink containing alcohol? 1 Filed at: 09/07/2024 0808   2. How many drinks containing alcohol do you have on a typical day you are drinking?  4 Filed at: 09/07/2024 0808   3b. FEMALE Any Age, or MALE 65+: How often do you have 4 or more drinks on one occassion? 1 Filed at: 09/07/2024 0808   Audit-C Score 6 Filed at: 09/07/2024 0808            Viral' Criteria for PE      Flowsheet Row Most Recent  Value   Wells' Criteria for PE    Clinical signs and symptoms of DVT 0 Filed at: 09/07/2024 0812   PE is primary diagnosis or equally likely 0 Filed at: 09/07/2024 0812   HR >100 0 Filed at: 09/07/2024 0812   Immobilization at least 3 days or Surgery in the previous 4 weeks 0 Filed at: 09/07/2024 0812   Previous, objectively diagnosed PE or DVT --   Hemoptysis 0 Filed at: 09/07/2024 0812   Malignancy with treatment within 6 months or palliative 0 Filed at: 09/07/2024 0812   Wells' Criteria Total 0 Filed at: 09/07/2024 0812          Wells' Criteria for DVT      Flowsheet Row Most Recent Value   Wells' Criteria for DVT    Active cancer Treatment or palliation within 6 months 0 Filed at: 09/07/2024 0812   Bedridden recently >3 days or major surgery within 12 weeks 0 Filed at: 09/07/2024 0812   Calf swelling >3 cm compared to the other leg 0 Filed at: 09/07/2024 0812   Entire leg swollen 0 Filed at: 09/07/2024 0812   Collateral (nonvaricose) superficial veins present 0 Filed at: 09/07/2024 0812   Localized tenderness along the deep venous system 0 Filed at: 09/07/2024 0812   Pitting edema, confined to symptomatic leg 0 Filed at: 09/07/2024 0812   Paralysis, paresis, or recent plaster immobilization of the lower extremity 0 Filed at: 09/07/2024 0812   Previously documented DVT 0 Filed at: 09/07/2024 0812   Alternative diagnosis to DVT as likely or more likely 0 Filed at: 09/07/2024 0812   Wells DVT Critera Score 0 Filed at: 09/07/2024 0812                Medical Decision Making  34 year old female, bilateral leg swelling intermittently, no chest pain or sob. CHF vs venous insufficiency more likely. Less likely to be do DVT or PE. Labs okay, will follow up with PCP.     Amount and/or Complexity of Data Reviewed  Labs: ordered.                 Disposition  Final diagnoses:   Lower extremity edema     Time reflects when diagnosis was documented in both MDM as applicable and the Disposition within this note       Time User  Action Codes Description Comment    9/7/2024  8:33 AM Lizandro Morley Add [R60.0] Lower extremity edema           ED Disposition       ED Disposition   Discharge    Condition   Stable    Date/Time   Sat Sep 7, 2024 0833    Comment   Janene Veloz discharge to home/self care.                   Follow-up Information       Follow up With Specialties Details Why Contact Info Additional Information    [facility] Medicine In 1 week  [address] [place] [id number] [phone] [facility], [address] [place] [address], [id number] [phone]            Patient's Medications   Discharge Prescriptions    No medications on file       No discharge procedures on file.    PDMP Review       None            ED Provider  Electronically Signed by             Lizandro Morley DO  09/07/24 0847

## 2024-09-25 ENCOUNTER — HOSPITAL ENCOUNTER (EMERGENCY)
Facility: HOSPITAL | Age: 34
Discharge: HOME | End: 2024-09-25
Attending: EMERGENCY MEDICINE | Admitting: EMERGENCY MEDICINE
Payer: MEDICARE

## 2024-09-25 VITALS
SYSTOLIC BLOOD PRESSURE: 144 MMHG | RESPIRATION RATE: 18 BRPM | BODY MASS INDEX: 36.26 KG/M2 | DIASTOLIC BLOOD PRESSURE: 85 MMHG | OXYGEN SATURATION: 98 % | TEMPERATURE: 98.3 F | WEIGHT: 224.65 LBS | HEART RATE: 82 BPM

## 2024-09-25 DIAGNOSIS — B07.9 VERRUCA VULGARIS: Primary | ICD-10-CM

## 2024-09-25 PROCEDURE — 99282 EMERGENCY DEPT VISIT SF MDM: CPT

## 2024-09-25 PROCEDURE — 99284 EMERGENCY DEPT VISIT MOD MDM: CPT

## 2024-09-26 NOTE — ED PROVIDER NOTES
1. Verruca vulgaris      ED Disposition       ED Disposition   Discharge    Condition   Stable    Date/Time   Wed Sep 25, 2024  8:08 PM    Comment   Janene Schwarz Veloz discharge to home/self care.                     Assessment & Plan         Medical Decision Making  Patient presents for evaluation of right ring finger pain and a wound at the base of the finger.  The extremity is neurovascularly intact.  Differential diagnosis includes but is not limited to verruca vulgaris, cellulitis, abscess, cyst, herpetic marya, or contact dermatitis.  Physical examination is most consistent with verruca vulgaris.  Will begin salicylic acid and refer to dermatology for further evaluation.  Patient is in agreement with the treatment plan and all questions were answered.  Return precautions discussed and she verbalized understanding.  Follow-up with PCP and dermatology, but return to the ED in the interim with new or worsening symptoms.    Problems Addressed:  Verruca vulgaris: acute illness or injury    Risk  OTC drugs.         Medications - No data to display    Chief Complaint   Patient presents with    Finger Pain     Pt c/o right ring finger pain with a wart like bump at base of finger for about 1 month.        History of Present Illness         The patient is a 34-year-old female with a past medical history of anemia, anxiety, asthma, allergic rhinitis, and bipolar disorder, who presents for evaluation of finger pain.  She reports noticing a small bump on her right fourth finger approximately 1 month ago, however it only began causing pain today.  No bleeding or drainage from the wound.  She denies any known injury, but does state she often works with her hands.          Review of Systems   Constitutional:  Negative for chills and fever.   HENT:  Negative for ear pain and sore throat.    Eyes:  Negative for pain and visual disturbance.   Respiratory:  Negative for cough and shortness of breath.    Cardiovascular:   Negative for chest pain and palpitations.   Gastrointestinal:  Negative for abdominal pain and vomiting.   Genitourinary:  Negative for dysuria and hematuria.   Musculoskeletal:  Positive for arthralgias. Negative for back pain, joint swelling and myalgias.   Skin:  Positive for wound. Negative for color change and rash.   Neurological:  Negative for seizures, syncope, weakness and numbness.   All other systems reviewed and are negative.      Objective     ED Triage Vitals   Temperature Pulse Blood Pressure Respirations SpO2 Patient Position - Orthostatic VS   09/25/24 1948 09/25/24 1946 09/25/24 1946 09/25/24 1946 09/25/24 1946 --   98.3 °F (36.8 °C) 82 144/85 18 98 %       Temp src Heart Rate Source BP Location FiO2 (%) Pain Score    -- -- -- -- --                Physical Exam  Vitals and nursing note reviewed.   Constitutional:       General: She is awake. She is not in acute distress.     Appearance: Normal appearance. She is well-developed. She is obese. She is not toxic-appearing or diaphoretic.   HENT:      Head: Normocephalic and atraumatic.      Right Ear: External ear normal.      Left Ear: External ear normal.      Nose: Nose normal.      Mouth/Throat:      Lips: Pink.      Mouth: Mucous membranes are moist.      Pharynx: Oropharynx is clear. Uvula midline.   Eyes:      General: Lids are normal. Vision grossly intact. Gaze aligned appropriately.      Conjunctiva/sclera: Conjunctivae normal.      Pupils: Pupils are equal, round, and reactive to light.   Cardiovascular:      Rate and Rhythm: Normal rate and regular rhythm.   Pulmonary:      Effort: Pulmonary effort is normal. No respiratory distress.   Musculoskeletal:        Hands:       Cervical back: Normal, full passive range of motion without pain and neck supple.      Thoracic back: Normal.      Lumbar back: Normal.      Comments: Tenderness to palpation of the palmar aspect of the right 4th digit between the MCP and PIP joints.  There is a firm,  sub-centimeter, immobile nodule at the base of the 4th digit, along the ulnar aspect.  No drainage from the nodule upon palpation.  Full AROM of all digits including thumb opposition.  Full sensation in all digits.  2+ radial pulse and capillary refill takes less than 2 seconds.   Skin:     General: Skin is warm.      Capillary Refill: Capillary refill takes less than 2 seconds.      Coloration: Skin is not pale.      Findings: Wound present. No rash.   Neurological:      Mental Status: She is alert and oriented to person, place, and time.   Psychiatric:         Attention and Perception: Attention normal.         Mood and Affect: Mood normal.         Speech: Speech normal.         Behavior: Behavior is cooperative.         Labs Reviewed - No data to display  No orders to display       Procedures      ED Medication and Procedure Management   Prior to Admission Medications   Prescriptions Last Dose Informant Patient Reported? Taking?   albuterol (Proventil HFA) 90 mcg/act inhaler   No No   Sig: Inhale 2 puffs every 4 (four) hours as needed for wheezing   aluminum-magnesium hydroxide-simethicone (MAALOX MAX) 400-400-40 MG/5ML suspension   No No   Sig: Take 10 mL by mouth every 6 (six) hours as needed for indigestion or heartburn   bismuth subsalicylate (PEPTO BISMOL) 262 MG chewable tablet   No No   Sig: Chew 1 tablet (262 mg total) 4 (four) times a day (before meals and at bedtime)   cyclobenzaprine (FLEXERIL) 10 mg tablet   No No   Sig: Take 1 tablet (10 mg total) by mouth 2 (two) times a day as needed for muscle spasms   dicyclomine (BENTYL) 10 mg capsule   No No   Sig: Take 1 capsule (10 mg total) by mouth 4 (four) times a day (before meals and at bedtime) for 7 days   divalproex sodium (Depakote) 500 mg DR tablet   No No   Sig: Take 1 tablet (500 mg total) by mouth every 8 (eight) hours   fluticasone (FLONASE) 50 mcg/act nasal spray   No No   Si spray into each nostril daily   lidocaine (Lidoderm) 5 %   No No    Sig: Apply 1 patch topically over 12 hours daily Remove & Discard patch within 12 hours or as directed by MD   naproxen (Naprosyn) 500 mg tablet   No No   Sig: Take 1 tablet (500 mg total) by mouth 2 (two) times a day with meals   naproxen (Naprosyn) 500 mg tablet   No No   Sig: Take 1 tablet (500 mg total) by mouth 2 (two) times a day with meals   omeprazole (PriLOSEC) 20 mg delayed release capsule   No No   Sig: Take 1 capsule (20 mg total) by mouth daily   omeprazole (PriLOSEC) 20 mg delayed release capsule   No No   Sig: Take 1 capsule (20 mg total) by mouth every 12 (twelve) hours for 14 days   ondansetron (ZOFRAN-ODT) 8 mg disintegrating tablet   No No   Sig: Take 1 tablet (8 mg total) by mouth every 8 (eight) hours as needed for nausea or vomiting   polyethylene glycol-electrolytes (NULYTELY) 4000 mL solution   No No   Sig: Take 4,000 mL by mouth once for 1 dose      Facility-Administered Medications: None     Discharge Medication List as of 9/25/2024  8:08 PM        START taking these medications    Details   salicylic acid 17 % gel Apply topically daily, Starting Wed 9/25/2024, Normal           CONTINUE these medications which have NOT CHANGED    Details   albuterol (Proventil HFA) 90 mcg/act inhaler Inhale 2 puffs every 4 (four) hours as needed for wheezing, Starting Tue 5/7/2024, Normal      aluminum-magnesium hydroxide-simethicone (MAALOX MAX) 400-400-40 MG/5ML suspension Take 10 mL by mouth every 6 (six) hours as needed for indigestion or heartburn, Starting Tue 2/20/2024, Normal      bismuth subsalicylate (PEPTO BISMOL) 262 MG chewable tablet Chew 1 tablet (262 mg total) 4 (four) times a day (before meals and at bedtime), Starting Tue 3/5/2024, Normal      cyclobenzaprine (FLEXERIL) 10 mg tablet Take 1 tablet (10 mg total) by mouth 2 (two) times a day as needed for muscle spasms, Starting Wed 5/29/2024, Normal      dicyclomine (BENTYL) 10 mg capsule Take 1 capsule (10 mg total) by mouth 4 (four)  times a day (before meals and at bedtime) for 7 days, Starting Wed 1/31/2024, Until Wed 2/7/2024, Normal      divalproex sodium (Depakote) 500 mg DR tablet Take 1 tablet (500 mg total) by mouth every 8 (eight) hours, Starting Wed 1/31/2024, Normal      fluticasone (FLONASE) 50 mcg/act nasal spray 1 spray into each nostril daily, Starting Tue 5/7/2024, Normal      lidocaine (Lidoderm) 5 % Apply 1 patch topically over 12 hours daily Remove & Discard patch within 12 hours or as directed by MD, Starting Wed 5/29/2024, Normal      !! naproxen (Naprosyn) 500 mg tablet Take 1 tablet (500 mg total) by mouth 2 (two) times a day with meals, Starting Tue 1/23/2024, Normal      !! naproxen (Naprosyn) 500 mg tablet Take 1 tablet (500 mg total) by mouth 2 (two) times a day with meals, Starting Wed 5/29/2024, Normal      omeprazole (PriLOSEC) 20 mg delayed release capsule Take 1 capsule (20 mg total) by mouth daily, Starting Tue 2/20/2024, Normal      ondansetron (ZOFRAN-ODT) 8 mg disintegrating tablet Take 1 tablet (8 mg total) by mouth every 8 (eight) hours as needed for nausea or vomiting, Starting Tue 2/20/2024, Normal      polyethylene glycol-electrolytes (NULYTELY) 4000 mL solution Take 4,000 mL by mouth once for 1 dose, Starting Tue 2/20/2024, Normal       !! - Potential duplicate medications found. Please discuss with provider.             Sharon Ruiz PA-C  09/29/24 7246

## 2024-10-17 ENCOUNTER — HOSPITAL ENCOUNTER (INPATIENT)
Facility: HOSPITAL | Age: 34
LOS: 3 days | Discharge: HOME | DRG: 753 | End: 2024-10-20
Attending: STUDENT IN AN ORGANIZED HEALTH CARE EDUCATION/TRAINING PROGRAM | Admitting: STUDENT IN AN ORGANIZED HEALTH CARE EDUCATION/TRAINING PROGRAM
Payer: COMMERCIAL

## 2024-10-17 ENCOUNTER — HOSPITAL ENCOUNTER (EMERGENCY)
Facility: HOSPITAL | Age: 34
End: 2024-10-17
Attending: EMERGENCY MEDICINE
Payer: MEDICARE

## 2024-10-17 VITALS
HEART RATE: 84 BPM | BODY MASS INDEX: 37.75 KG/M2 | DIASTOLIC BLOOD PRESSURE: 82 MMHG | SYSTOLIC BLOOD PRESSURE: 127 MMHG | WEIGHT: 233.91 LBS | OXYGEN SATURATION: 100 % | RESPIRATION RATE: 18 BRPM | TEMPERATURE: 98.6 F

## 2024-10-17 DIAGNOSIS — R45.851 SUICIDAL IDEATION: Primary | ICD-10-CM

## 2024-10-17 DIAGNOSIS — Z00.8 ENCOUNTER FOR PSYCHOLOGICAL EVALUATION: ICD-10-CM

## 2024-10-17 DIAGNOSIS — F10.21 ALCOHOLISM IN REMISSION (HCC): ICD-10-CM

## 2024-10-17 DIAGNOSIS — F31.9 BIPOLAR 1 DISORDER (HCC): Primary | Chronic | ICD-10-CM

## 2024-10-17 DIAGNOSIS — Z59.00 HOMELESSNESS: ICD-10-CM

## 2024-10-17 LAB
AMPHETAMINES SERPL QL SCN: NEGATIVE
BARBITURATES UR QL: NEGATIVE
BENZODIAZ UR QL: NEGATIVE
COCAINE UR QL: NEGATIVE
ETHANOL EXG-MCNC: 0 MG/DL
EXT PREGNANCY TEST URINE: NEGATIVE
EXT. CONTROL: NORMAL
FENTANYL UR QL SCN: NEGATIVE
HYDROCODONE UR QL SCN: NEGATIVE
METHADONE UR QL: NEGATIVE
OPIATES UR QL SCN: NEGATIVE
OXYCODONE+OXYMORPHONE UR QL SCN: NEGATIVE
PCP UR QL: NEGATIVE
THC UR QL: POSITIVE

## 2024-10-17 PROCEDURE — GZ59ZZZ INDIVIDUAL PSYCHOTHERAPY, PSYCHOPHYSIOLOGICAL: ICD-10-PCS | Performed by: STUDENT IN AN ORGANIZED HEALTH CARE EDUCATION/TRAINING PROGRAM

## 2024-10-17 PROCEDURE — 99285 EMERGENCY DEPT VISIT HI MDM: CPT | Performed by: EMERGENCY MEDICINE

## 2024-10-17 PROCEDURE — 80307 DRUG TEST PRSMV CHEM ANLYZR: CPT | Performed by: EMERGENCY MEDICINE

## 2024-10-17 PROCEDURE — 82075 ASSAY OF BREATH ETHANOL: CPT | Performed by: EMERGENCY MEDICINE

## 2024-10-17 PROCEDURE — GZHZZZZ GROUP PSYCHOTHERAPY: ICD-10-PCS | Performed by: STUDENT IN AN ORGANIZED HEALTH CARE EDUCATION/TRAINING PROGRAM

## 2024-10-17 PROCEDURE — 81025 URINE PREGNANCY TEST: CPT

## 2024-10-17 PROCEDURE — 99285 EMERGENCY DEPT VISIT HI MDM: CPT

## 2024-10-17 RX ORDER — ACETAMINOPHEN 325 MG/1
650 TABLET ORAL EVERY 6 HOURS PRN
Status: CANCELLED | OUTPATIENT
Start: 2024-10-17

## 2024-10-17 RX ORDER — HYDROXYZINE HYDROCHLORIDE 50 MG/1
50 TABLET, FILM COATED ORAL
Status: CANCELLED | OUTPATIENT
Start: 2024-10-17

## 2024-10-17 RX ORDER — ACETAMINOPHEN 325 MG/1
975 TABLET ORAL EVERY 6 HOURS PRN
Status: DISCONTINUED | OUTPATIENT
Start: 2024-10-17 | End: 2024-10-20 | Stop reason: HOSPADM

## 2024-10-17 RX ORDER — ACETAMINOPHEN 325 MG/1
650 TABLET ORAL EVERY 4 HOURS PRN
Status: DISCONTINUED | OUTPATIENT
Start: 2024-10-17 | End: 2024-10-20 | Stop reason: HOSPADM

## 2024-10-17 RX ORDER — POLYETHYLENE GLYCOL 3350 17 G/17G
17 POWDER, FOR SOLUTION ORAL DAILY PRN
Status: CANCELLED | OUTPATIENT
Start: 2024-10-17

## 2024-10-17 RX ORDER — TRAZODONE HYDROCHLORIDE 50 MG/1
50 TABLET, FILM COATED ORAL
Status: DISCONTINUED | OUTPATIENT
Start: 2024-10-17 | End: 2024-10-18

## 2024-10-17 RX ORDER — OLANZAPINE 5 MG/1
5 TABLET ORAL
Status: CANCELLED | OUTPATIENT
Start: 2024-10-17

## 2024-10-17 RX ORDER — HYDROXYZINE HYDROCHLORIDE 25 MG/1
25 TABLET, FILM COATED ORAL
Status: CANCELLED | OUTPATIENT
Start: 2024-10-17

## 2024-10-17 RX ORDER — POLYETHYLENE GLYCOL 3350 17 G/17G
17 POWDER, FOR SOLUTION ORAL DAILY PRN
Status: DISCONTINUED | OUTPATIENT
Start: 2024-10-17 | End: 2024-10-20 | Stop reason: HOSPADM

## 2024-10-17 RX ORDER — OLANZAPINE 5 MG/1
5 TABLET ORAL
Status: DISCONTINUED | OUTPATIENT
Start: 2024-10-17 | End: 2024-10-20 | Stop reason: HOSPADM

## 2024-10-17 RX ORDER — PROPRANOLOL HCL 20 MG
10 TABLET ORAL EVERY 8 HOURS PRN
Status: CANCELLED | OUTPATIENT
Start: 2024-10-17

## 2024-10-17 RX ORDER — BENZTROPINE MESYLATE 1 MG/ML
1 INJECTION, SOLUTION INTRAMUSCULAR; INTRAVENOUS
Status: DISCONTINUED | OUTPATIENT
Start: 2024-10-17 | End: 2024-10-20 | Stop reason: HOSPADM

## 2024-10-17 RX ORDER — OLANZAPINE 10 MG/2ML
5 INJECTION, POWDER, FOR SOLUTION INTRAMUSCULAR
Status: DISCONTINUED | OUTPATIENT
Start: 2024-10-17 | End: 2024-10-20 | Stop reason: HOSPADM

## 2024-10-17 RX ORDER — PROPRANOLOL HYDROCHLORIDE 10 MG/1
10 TABLET ORAL EVERY 8 HOURS PRN
Status: DISCONTINUED | OUTPATIENT
Start: 2024-10-17 | End: 2024-10-20 | Stop reason: HOSPADM

## 2024-10-17 RX ORDER — BISACODYL 10 MG
10 SUPPOSITORY, RECTAL RECTAL DAILY PRN
Status: DISCONTINUED | OUTPATIENT
Start: 2024-10-17 | End: 2024-10-20 | Stop reason: HOSPADM

## 2024-10-17 RX ORDER — BENZTROPINE MESYLATE 1 MG/1
1 TABLET ORAL
Status: CANCELLED | OUTPATIENT
Start: 2024-10-17

## 2024-10-17 RX ORDER — OLANZAPINE 10 MG/2ML
2.5 INJECTION, POWDER, FOR SOLUTION INTRAMUSCULAR
Status: DISCONTINUED | OUTPATIENT
Start: 2024-10-17 | End: 2024-10-20 | Stop reason: HOSPADM

## 2024-10-17 RX ORDER — MAGNESIUM HYDROXIDE/ALUMINUM HYDROXICE/SIMETHICONE 120; 1200; 1200 MG/30ML; MG/30ML; MG/30ML
30 SUSPENSION ORAL EVERY 4 HOURS PRN
Status: CANCELLED | OUTPATIENT
Start: 2024-10-17

## 2024-10-17 RX ORDER — OLANZAPINE 10 MG/2ML
2.5 INJECTION, POWDER, FOR SOLUTION INTRAMUSCULAR
Status: CANCELLED | OUTPATIENT
Start: 2024-10-17

## 2024-10-17 RX ORDER — AMOXICILLIN 250 MG
1 CAPSULE ORAL DAILY PRN
Status: DISCONTINUED | OUTPATIENT
Start: 2024-10-17 | End: 2024-10-20 | Stop reason: HOSPADM

## 2024-10-17 RX ORDER — HYDROXYZINE HYDROCHLORIDE 50 MG/1
100 TABLET, FILM COATED ORAL
Status: DISCONTINUED | OUTPATIENT
Start: 2024-10-17 | End: 2024-10-20 | Stop reason: HOSPADM

## 2024-10-17 RX ORDER — OLANZAPINE 2.5 MG/1
2.5 TABLET, FILM COATED ORAL
Status: DISCONTINUED | OUTPATIENT
Start: 2024-10-17 | End: 2024-10-20 | Stop reason: HOSPADM

## 2024-10-17 RX ORDER — LORAZEPAM 2 MG/ML
2 INJECTION INTRAMUSCULAR EVERY 6 HOURS PRN
Status: CANCELLED | OUTPATIENT
Start: 2024-10-17

## 2024-10-17 RX ORDER — BISACODYL 10 MG
10 SUPPOSITORY, RECTAL RECTAL DAILY PRN
Status: CANCELLED | OUTPATIENT
Start: 2024-10-17

## 2024-10-17 RX ORDER — LANOLIN ALCOHOL/MO/W.PET/CERES
3 CREAM (GRAM) TOPICAL
Status: DISCONTINUED | OUTPATIENT
Start: 2024-10-17 | End: 2024-10-18

## 2024-10-17 RX ORDER — BENZTROPINE MESYLATE 1 MG/1
1 TABLET ORAL
Status: DISCONTINUED | OUTPATIENT
Start: 2024-10-17 | End: 2024-10-20 | Stop reason: HOSPADM

## 2024-10-17 RX ORDER — HYDROXYZINE HYDROCHLORIDE 50 MG/1
100 TABLET, FILM COATED ORAL
Status: CANCELLED | OUTPATIENT
Start: 2024-10-17

## 2024-10-17 RX ORDER — ACETAMINOPHEN 325 MG/1
650 TABLET ORAL EVERY 4 HOURS PRN
Status: CANCELLED | OUTPATIENT
Start: 2024-10-17

## 2024-10-17 RX ORDER — ACETAMINOPHEN 325 MG/1
650 TABLET ORAL EVERY 6 HOURS PRN
Status: DISCONTINUED | OUTPATIENT
Start: 2024-10-17 | End: 2024-10-20 | Stop reason: HOSPADM

## 2024-10-17 RX ORDER — DIPHENHYDRAMINE HYDROCHLORIDE 50 MG/ML
50 INJECTION INTRAMUSCULAR; INTRAVENOUS EVERY 6 HOURS PRN
Status: CANCELLED | OUTPATIENT
Start: 2024-10-17

## 2024-10-17 RX ORDER — ACETAMINOPHEN 325 MG/1
975 TABLET ORAL EVERY 6 HOURS PRN
Status: CANCELLED | OUTPATIENT
Start: 2024-10-17

## 2024-10-17 RX ORDER — HYDROXYZINE HYDROCHLORIDE 50 MG/1
50 TABLET, FILM COATED ORAL
Status: DISCONTINUED | OUTPATIENT
Start: 2024-10-17 | End: 2024-10-20 | Stop reason: HOSPADM

## 2024-10-17 RX ORDER — OLANZAPINE 10 MG/2ML
5 INJECTION, POWDER, FOR SOLUTION INTRAMUSCULAR
Status: CANCELLED | OUTPATIENT
Start: 2024-10-17

## 2024-10-17 RX ORDER — BENZTROPINE MESYLATE 1 MG/ML
1 INJECTION, SOLUTION INTRAMUSCULAR; INTRAVENOUS
Status: CANCELLED | OUTPATIENT
Start: 2024-10-17

## 2024-10-17 RX ORDER — DIPHENHYDRAMINE HYDROCHLORIDE 50 MG/ML
50 INJECTION INTRAMUSCULAR; INTRAVENOUS EVERY 6 HOURS PRN
Status: DISCONTINUED | OUTPATIENT
Start: 2024-10-17 | End: 2024-10-20 | Stop reason: HOSPADM

## 2024-10-17 RX ORDER — HYDROXYZINE HYDROCHLORIDE 25 MG/1
25 TABLET, FILM COATED ORAL
Status: DISCONTINUED | OUTPATIENT
Start: 2024-10-17 | End: 2024-10-20 | Stop reason: HOSPADM

## 2024-10-17 RX ORDER — AMOXICILLIN 250 MG
1 CAPSULE ORAL DAILY PRN
Status: CANCELLED | OUTPATIENT
Start: 2024-10-17

## 2024-10-17 RX ORDER — TRAZODONE HYDROCHLORIDE 50 MG/1
50 TABLET, FILM COATED ORAL
Status: CANCELLED | OUTPATIENT
Start: 2024-10-17

## 2024-10-17 RX ORDER — OLANZAPINE 5 MG/1
2.5 TABLET ORAL
Status: CANCELLED | OUTPATIENT
Start: 2024-10-17

## 2024-10-17 RX ORDER — LORAZEPAM 2 MG/ML
2 INJECTION INTRAMUSCULAR EVERY 6 HOURS PRN
Status: DISCONTINUED | OUTPATIENT
Start: 2024-10-17 | End: 2024-10-20 | Stop reason: HOSPADM

## 2024-10-17 RX ORDER — MAGNESIUM HYDROXIDE/ALUMINUM HYDROXICE/SIMETHICONE 120; 1200; 1200 MG/30ML; MG/30ML; MG/30ML
30 SUSPENSION ORAL EVERY 4 HOURS PRN
Status: DISCONTINUED | OUTPATIENT
Start: 2024-10-17 | End: 2024-10-20 | Stop reason: HOSPADM

## 2024-10-17 RX ADMIN — Medication 3 MG: at 22:39

## 2024-10-17 SDOH — ECONOMIC STABILITY - HOUSING INSECURITY: HOMELESSNESS UNSPECIFIED: Z59.00

## 2024-10-17 NOTE — ED NOTES
Assumed care for pt. Pt currently sleeping with no s/s of distress observed. Continues on 1:1 for safety.      Blank Otero RN  10/17/24 3075     English

## 2024-10-17 NOTE — ED NOTES
Insurance Authorization for admission:   Phone call placed to Jim ([facility])  Phone number: [phone]  Spoke to Shellie     5 days approved.  Level of care: voluntary inpatient mental health  Review on pending admission  Authorization # pending admission     Eligibility Verification System checked - ([phone]).  Online system / automated system indicates: active       Recipient ID: [id number]

## 2024-10-17 NOTE — ED NOTES
Patient is accepted at [facility]  Patient is accepted by Verónica Renteria MD.     Transportation is arranged with:  Transportation is scheduled for **.   Patient may go to the floor at 1300.          Nurse report is to be called to [phone] prior to patient transfer.

## 2024-10-17 NOTE — ED NOTES
Insurance   Eligibility Verification System checked - ([phone]).  Online system / automated system indicates: active with [employer]  ID # [id number]

## 2024-10-17 NOTE — ED NOTES
Patient is a 34 yr old female with a hx of mental health and substance use disorder. She arrived in the ED with depression and suicidal thoughts, and a plan to jump in front of a car or bus. She reports that she has been homeless for the last 4 months, occasionally staying in her girlfriend's car, but has been unable to find any stable, affordable housing. She is employed at a warehouse. She states that she has no family to stay with. She reports that she is suicidal with a plan to jump in fromt of a bus or car. Mood is depressed, anxious, feeling helpless and hopeless. She has been unable to find help. She is currently on house arrest for a DUI. (second offense). She no longer drinks alcohol, and in the past used cocaine but not for years. No over psychosis. she is calm and cooperative, with poor eye contact. Patient signed a 201 for admission.     Patient currently has no providers.  She states that she had a suicide attempt at age 16, an overdose attempt, and was hospitalized at [facility].  She was on depakote and welbutrin but has not taken any meds for years.     Roxanna TESFAYE
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
## 2024-10-17 NOTE — ED CARE HANDOFF
Emergency Department Sign Out Note        Sign out and transfer of care from Dr. Patton. See Separate Emergency Department note.     The patient, Janene Veloz, was evaluated by the previous provider for psychiatric evaluation.    Workup Completed:  Breath alcohol    ED Course / Workup Pending (followup):  UDS, crisis consult                                  ED Course as of 10/17/24 0741   Th Oct 17, 2024   0656 SO: hx of bipolar, parolee, depressed over living situation, pending crisis     Procedures  Medical Decision Making  Amount and/or Complexity of Data Reviewed  Labs: ordered.    Risk  Decision regarding hospitalization.            Disposition  Final diagnoses:   Suicidal ideation   Encounter for psychological evaluation   Homelessness     Time reflects when diagnosis was documented in both MDM as applicable and the Disposition within this note       Time User Action Codes Description Comment    10/17/2024  7:40 AM Ryan Lassiter [R45.851] Suicidal ideation     10/17/2024  7:41 AM Ryan Lassiter [Z00.8] Encounter for psychological evaluation     10/17/2024  7:41 AM Ryan Lassiter [Z59.00] Homelessness           ED Disposition       ED Disposition   Transfer to Behavioral Health    [facility]   --    Date/Time   Thu Oct 17, 2024  7:02 AM    Comment   Janene Veloz should be transferred out to  and has been medically cleared.               Follow-up Information    None       Patient's Medications   Discharge Prescriptions    No medications on file     No discharge procedures on file.       ED Provider  Electronically Signed by     Ryan Lassiter MD  10/17/24 0713       Ryan Lassiter MD  10/17/24 0741

## 2024-10-17 NOTE — PROGRESS NOTES
-(2) Cellphones  -Black Wallet  -Guillaume Forman. Backpack  -White [employer] Nike's  -Blue uniform pants  -White uniform shirt  -Bra  -Socks  -White Modela T-shirt  -Gray Nike Hoodie  -NFL Cowboy's Hat  -Black L/S Shirt  -Pocket Knife sent to security  -Wired Bluetooth headphones  -Apple Airpods  -USB Wire  -Meds given to the nurse  -Toothpaste  -Tooth brush  -Small lotion  -Small conditioner  -$0.36  -(2) Phone chargers  -Electric wire  -Black journal  -Visa Debit (2230)  -EBT (215)  -Visa Debit (8878)  -Blue Shield   -[place] Metro Card  -Clean R[employer] Laundry Card  -Plasma Donor Card  -Misc. Bus Cards  -(2) [place] ID Cards  -SSC  -[place] Bus 1 Day Pass  -(6) AmeriHealth Cards  -[place] Med Marijuana card    Bedside Belongings;  -Nath Nike Sweatpants (request string removed)  -Black L/S Shirt  -Bra  -Socks  -Underwear  -Single contact

## 2024-10-17 NOTE — ED NOTES
Patient noted to have ankle bracelet on and pocket knife was given to security. 1:1 observation initiated.     Pili Hawk RN  10/17/24 0701

## 2024-10-17 NOTE — PLAN OF CARE
Problem: SELF HARM/SUICIDALITY  Goal: Will have no self-injury during hospital stay  Description: INTERVENTIONS:  - Q 15 MINUTES: Routine safety checks  - Q WAKING SHIFT & PRN: Assess risk to determine if routine checks are adequate to maintain patient safety  - Encourage patient to participate actively in care by formulating a plan to combat response to suicidal ideation, identify supports and resources  Outcome: Progressing     Problem: DEPRESSION  Goal: Will be euthymic at discharge  Description: INTERVENTIONS:  - Administer medication as ordered  - Provide emotional support via 1:1 interaction with staff  - Encourage involvement in milieu/groups/activities  - Monitor for social isolation  Outcome: Progressing     Problem: ANXIETY  Goal: Will report anxiety at manageable levels  Description: INTERVENTIONS:  - Administer medication as ordered  - Teach and encourage coping skills  - Provide emotional support  - Assess patient/family for anxiety and ability to cope  Outcome: Progressing  Goal: By discharge: Patient will verbalize 2 strategies to deal with anxiety  Description: Interventions:  - Identify any obvious source/trigger to anxiety  - Staff will assist patient in applying identified coping technique/skills  - Encourage attendance of scheduled groups and activities  Outcome: Progressing     Problem: SLEEP DISTURBANCE  Goal: Will exhibit normal sleeping pattern  Description: Interventions:  -  Assess the patients sleep pattern, noting recent changes  - Administer medication as ordered  - Decrease environmental stimuli, including noise, as appropriate during the night  - Encourage the patient to actively participate in unit groups and or exercise during the day to enhance ability to achieve adequate sleep at night  - Assess the patient, in the morning, encouraging a description of sleep experience  Outcome: Progressing     Problem: SELF CARE DEFICIT  Goal: Return ADL status to a safe level of  function  Description: INTERVENTIONS:  - Administer medication as ordered  - Assess ADL deficits and provide assistive devices as needed  - Obtain PT/OT consults as needed  - Assist and instruct patient to increase activity and self care as tolerated  Outcome: Progressing

## 2024-10-17 NOTE — EMTALA/ACUTE CARE TRANSFER
[facility] EMERGENCY DEPARTMENT  [address] [address]  [phone]  Dept: [phone]      EMTALA TRANSFER CONSENT    NAME Janene Veloz                                         1990                              MRN [id number]    I have been informed of my rights regarding examination, treatment, and transfer   by Dr. Ryan Lassiter MD    Benefits: Continuity of care    Risks: Potential for delay in receiving treatment      Transfer Request   I acknowledge that my medical condition has been evaluated and explained to me by the emergency department physician or other qualified medical person and/or my attending physician who has recommended and offered to me further medical examination and treatment. I understand the Hospital's obligation with respect to the treatment and stabilization of my emergency medical condition. I nevertheless request to be transferred. I release the Hospital, the doctor, and any other persons caring for me from all responsibility or liability for any injury or ill effects that may result from my transfer and agree to accept all responsibility for the consequences of my choice to transfer, rather than receive stabilizing treatment at the Hospital. I understand that because the transfer is my request, my insurance may not provide reimbursement for the services.  The Hospital will assist and direct me and my family in how to make arrangements for transfer, but the hospital is not liable for any fees charged by the transport service.  In spite of this understanding, I refuse to consent to further medical examination and treatment which has been offered to me, and request transfer to Accepting Facility Name, [facility] & State : [facility], [address] [place] [id number]. I authorize the performance of emergency medical procedures and treatments upon me in both transit and upon arrival at the receiving facility.  Additionally, I  authorize the release of any and all medical records to the receiving facility and request they be transported with me, if possible.    I authorize the performance of emergency medical procedures and treatments upon me in both transit and upon arrival at the receiving facility.  Additionally, I authorize the release of any and all medical records to the receiving facility and request they be transported with me, if possible.  I understand that the safest mode of transportation during a medical emergency is an ambulance and that the Hospital advocates the use of this mode of transport. Risks of traveling to the receiving facility by car, including absence of medical control, life sustaining equipment, such as oxygen, and medical personnel has been explained to me and I fully understand them.    (RADHA CORRECT BOX BELOW)  [  ]  I consent to the stated transfer and to be transported by ambulance/helicopter.  [  ]  I consent to the stated transfer, but refuse transportation by ambulance and accept full responsibility for my transportation by car.  I understand the risks of non-ambulance transfers and I exonerate the Hospital and its staff from any deterioration in my condition that results from this refusal.    X___________________________________________    DATE  10/17/24  TIME________  Signature of patient or legally responsible individual signing on patient behalf           RELATIONSHIP TO PATIENT_________________________          Provider Certification    NAME Janene Veloz                                         1990                              MRN [id number]    A medical screening exam was performed on the above named patient.  Based on the examination:    Condition Necessitating Transfer The primary encounter diagnosis was Suicidal ideation. 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    Patient  Condition: The patient has been stabilized such that within reasonable medical probability, no material deterioration of the patient condition or the condition of the unborn child(corinna) is likely to result from the transfer    Reason for Transfer: No bed available at level of patient's needs    Transfer Requirements: [facility], [address], [place] [address]   Space available and qualified personnel available for treatment as acknowledged by Jeanette Hernandez/ [phone]  Agreed to accept transfer and to provide appropriate medical treatment as acknowledged by       Verónica Renteria MD  Appropriate medical records of the examination and treatment of the patient are provided at the time of transfer   STAFF INITIAL WHEN COMPLETED _______  Transfer will be performed by qualified personnel from [facility]/ [phone]  and appropriate transfer equipment as required, including the use of necessary and appropriate life support measures.    Provider Certification: I have examined the patient and explained the following risks and benefits of being transferred/refusing transfer to the patient/family:  General risk, such as traffic hazards, adverse weather conditions, rough terrain or turbulence, possible failure of equipment (including vehicle or aircraft), or consequences of actions of persons outside the control of the transport personnel      Based on these reasonable risks and benefits to the patient and/or the unborn child(corinna), and based upon the information available at the time of the patient’s examination, I certify that the medical benefits reasonably to be expected from the provision of appropriate medical treatments at another medical facility outweigh the increasing risks, if any, to the individual’s medical condition, and in the case of labor to the unborn child, from effecting the transfer.    X____________________________________________ DATE 10/17/24        TIME_______      ORIGINAL -  SEND TO MEDICAL RECORDS   COPY - SEND WITH PATIENT DURING TRANSFER

## 2024-10-17 NOTE — ED PROVIDER NOTES
"  ED Disposition       None          Assessment & Plan   {Hyperlinks  Risk Stratification - NIHSS - HEART SCORE - Fill out sepsis note and make sure you call [phone] if severe or septic shock:[id number]}    Medical Decision Making  Amount and/or Complexity of Data Reviewed  Labs: ordered.             Medications - No data to display    ED Risk Strat Scores                                               History of Present Illness   {Hyperlinks  History (Med, Surg, Fam, Social) - Current Medications - Allergies  :[id number]}    Chief Complaint   Patient presents with   • Psychiatric Evaluation     Patient admits to suicidal ideation with plan to jump in front of a car. Reports \"cannot take it anymore and just wants to die.\" Has been homeless for the past 4 months, has not showered in 3 days and has so much stress in life that he would just like it to end. Reports no AH/VH but states that thoughts are vivid. Wants  called and has been unable to concentrate - has searched for shelters and has been trying to get help with no success. Cannot cope any longer with the stress.       Past Medical History:   Diagnosis Date   • Anemia due to chronic blood loss     has had recent iron infusions   • Anxiety    • Asthma    • Bipolar disorder (HCC)    • Depression    • Dry cough     pt states allergy related   • Environmental and seasonal allergies    • Fibroid    • Fibroid uterus 1/14/2019   • Food allergy     raisins   • Varicella    • Wears contact lenses       Past Surgical History:   Procedure Laterality Date   • NO PAST SURGERIES     • [place] SALPINGECTOMY COMPLETE/PARTIAL UNI/BI SPX Bilateral 1/31/2019    Procedure: SALPINGECTOMY;  Surgeon: Emilee Hernandez MD;  Location: [facility];  Service: Gynecology   • [place] TOTAL ABDOMINAL HYSTERECT W/WO RMVL TUBE OVARY N/A 1/31/2019    Procedure: HYSTERECTOMY TOTAL ABDOMINAL (ANTONIO);  Surgeon: Emilee Hernandez MD;  Location: [facility];  Service: Gynecology      Family History "   Problem Relation Age of Onset   • Colon cancer Father          age 44   • Prostate cancer Father    • Cirrhosis Father    • Alcohol abuse Father    • Sleep apnea Father    • Cancer Maternal Grandmother    • Other Mother         hysterectomy   • No Known Problems Sister    • No Known Problems Brother    • Other Paternal Grandmother          during surgery   • No Known Problems Brother    • Breast cancer Neg Hx    • Ovarian cancer Neg Hx       Social History     Tobacco Use   • Smoking status: Never     Passive exposure: Never   • Smokeless tobacco: Never   Vaping Use   • Vaping status: Never Used   Substance Use Topics   • Alcohol use: Yes     Alcohol/week: 4.0 - 5.0 standard drinks of alcohol     Types: 4 - 5 Cans of beer per week     Comment: rarely   • Drug use: Yes     Types: Marijuana     Comment: daily      E-Cigarette/Vaping   • E-Cigarette Use Never User       E-Cigarette/Vaping Substances   • Nicotine No    • THC No    • CBD No    • Flavoring No    • Other No    • Unknown No       I have reviewed and agree with the history as documented.     34-year-old female who came in voluntarily for voluntary psychiatric treatment.  She states that she has bipolar and homeless.  She states that for the last 2 months, she has been homeless.  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  She states that last night she slept in a barbershop.  In the days before that, she has slept in her car.  She states that she cannot get a high enough pain job because she is wearing an ankle monitor.  She has anxiety but has not needed medication to control this anxiety since she was 16 years old.  She does have a medical marijuana card but denies any illicit drug use.  Reports no alcohol.      Psychiatric Evaluation  Presenting symptoms: suicidal thoughts    Associated symptoms: anxiety    Associated symptoms: no abdominal pain        Review of Systems    Constitutional: Negative.    HENT: Negative.     Eyes: Negative.    Respiratory: Negative.     Cardiovascular: Negative.    Gastrointestinal: Negative.  Negative for abdominal pain.   Endocrine: Negative.    Genitourinary: Negative.    Musculoskeletal: Negative.    Skin: Negative.    Allergic/Immunologic: Negative.    Neurological: Negative.    Hematological: Negative.    Psychiatric/Behavioral:  Positive for sleep disturbance and suicidal ideas. The patient is nervous/anxious.    All other systems reviewed and are negative.          Objective   {Hyperlinks  Historical Vitals - Historical Labs - Chart Review/Microbiology - Last Echo - Code Status  :[id number]}    ED Triage Vitals [10/17/24 0625]   Temperature Pulse Blood Pressure Respirations SpO2 Patient Position - Orthostatic VS   98.6 °F (37 °C) 80 142/79 18 98 % Sitting      Temp Source Heart Rate Source BP Location FiO2 (%) Pain Score    Oral Monitor Right arm -- --      Vitals      Date and Time Temp Pulse SpO2 Resp BP Pain Score FACES Pain Rating User   10/17/24 0625 98.6 °F (37 °C) 80 98 % 18 142/79 -- -- EY            Physical Exam  Vitals and nursing note reviewed.   Constitutional:       Appearance: Normal appearance. She is well-developed. She is not toxic-appearing or diaphoretic.   HENT:      Head: Normocephalic and atraumatic.      Nose: Nose normal.      Mouth/Throat:      Mouth: Mucous membranes are moist.   Eyes:      General: Lids are normal.      Pupils: Pupils are equal, round, and reactive to light.   Neck:      Vascular: Normal carotid pulses. No carotid bruit.   Cardiovascular:      Rate and Rhythm: Normal rate and regular rhythm.      Heart sounds: Normal heart sounds. No murmur heard.     No friction rub. No gallop.   Pulmonary:      Effort: Pulmonary effort is normal. No respiratory distress.      Breath sounds: Normal breath sounds.   Abdominal:      General: Bowel sounds are normal.      Palpations: Abdomen is soft.   Musculoskeletal:          General: Tenderness present. No deformity. Normal range of motion.      Cervical back: Normal range of motion.   Skin:     General: Skin is warm and dry.      Coloration: Skin is not pale.   Neurological:      Mental Status: She is alert and oriented to person, place, and time.   Psychiatric:         Attention and Perception: Attention normal.         Mood and Affect: Mood is not anxious. Affect is not blunt.         Speech: Speech normal.         Behavior: Behavior normal.         Thought Content: Thought content includes suicidal ideation.         Judgment: Judgment normal.      Comments: Tearful         Results Reviewed       None            No orders to display       Procedures    ED Medication and Procedure Management   Prior to Admission Medications   Prescriptions Last Dose Informant Patient Reported? Taking?   albuterol (Proventil HFA) 90 mcg/act inhaler   No No   Sig: Inhale 2 puffs every 4 (four) hours as needed for wheezing   aluminum-magnesium hydroxide-simethicone (MAALOX MAX) 400-400-40 MG/5ML suspension   No No   Sig: Take 10 mL by mouth every 6 (six) hours as needed for indigestion or heartburn   bismuth subsalicylate (PEPTO BISMOL) 262 MG chewable tablet   No No   Sig: Chew 1 tablet (262 mg total) 4 (four) times a day (before meals and at bedtime)   cyclobenzaprine (FLEXERIL) 10 mg tablet   No No   Sig: Take 1 tablet (10 mg total) by mouth 2 (two) times a day as needed for muscle spasms   dicyclomine (BENTYL) 10 mg capsule   No No   Sig: Take 1 capsule (10 mg total) by mouth 4 (four) times a day (before meals and at bedtime) for 7 days   divalproex sodium (Depakote) 500 mg DR tablet   No No   Sig: Take 1 tablet (500 mg total) by mouth every 8 (eight) hours   fluticasone (FLONASE) 50 mcg/act nasal spray   No No   Si spray into each nostril daily   lidocaine (Lidoderm) 5 %   No No   Sig: Apply 1 patch topically over 12 hours daily Remove & Discard patch within 12 hours or as directed by MD    naproxen (Naprosyn) 500 mg tablet   No No   Sig: Take 1 tablet (500 mg total) by mouth 2 (two) times a day with meals   naproxen (Naprosyn) 500 mg tablet   No No   Sig: Take 1 tablet (500 mg total) by mouth 2 (two) times a day with meals   omeprazole (PriLOSEC) 20 mg delayed release capsule   No No   Sig: Take 1 capsule (20 mg total) by mouth daily   omeprazole (PriLOSEC) 20 mg delayed release capsule   No No   Sig: Take 1 capsule (20 mg total) by mouth every 12 (twelve) hours for 14 days   ondansetron (ZOFRAN-ODT) 8 mg disintegrating tablet   No No   Sig: Take 1 tablet (8 mg total) by mouth every 8 (eight) hours as needed for nausea or vomiting   polyethylene glycol-electrolytes (NULYTELY) 4000 mL solution   No No   Sig: Take 4,000 mL by mouth once for 1 dose   salicylic acid 17 % gel   No No   Sig: Apply topically daily      Facility-Administered Medications: None     Patient's Medications   Discharge Prescriptions    No medications on file     No discharge procedures on file.  ED SEPSIS DOCUMENTATION

## 2024-10-17 NOTE — ED NOTES
[facility]  PH:  [phone]

## 2024-10-18 PROBLEM — Z00.8 MEDICAL CLEARANCE FOR PSYCHIATRIC ADMISSION: Status: ACTIVE | Noted: 2024-10-18

## 2024-10-18 PROBLEM — F31.9 BIPOLAR 1 DISORDER (HCC): Chronic | Status: ACTIVE | Noted: 2024-01-31

## 2024-10-18 LAB
25(OH)D3 SERPL-MCNC: <7 NG/ML (ref 30–100)
ALBUMIN SERPL BCG-MCNC: 3.7 G/DL (ref 3.5–5)
ALP SERPL-CCNC: 58 U/L (ref 34–104)
ALT SERPL W P-5'-P-CCNC: 15 U/L (ref 7–52)
ANION GAP SERPL CALCULATED.3IONS-SCNC: 7 MMOL/L (ref 4–13)
AST SERPL W P-5'-P-CCNC: 12 U/L (ref 13–39)
ATRIAL RATE: 61 BPM
BASOPHILS # BLD AUTO: 0.02 THOUSANDS/ΜL (ref 0–0.1)
BASOPHILS NFR BLD AUTO: 0 % (ref 0–1)
BILIRUB SERPL-MCNC: 0.57 MG/DL (ref 0.2–1)
BUN SERPL-MCNC: 10 MG/DL (ref 5–25)
CALCIUM SERPL-MCNC: 8.6 MG/DL (ref 8.4–10.2)
CHLORIDE SERPL-SCNC: 107 MMOL/L (ref 96–108)
CHOLEST SERPL-MCNC: 170 MG/DL
CO2 SERPL-SCNC: 24 MMOL/L (ref 21–32)
CREAT SERPL-MCNC: 0.59 MG/DL (ref 0.6–1.3)
EOSINOPHIL # BLD AUTO: 0.19 THOUSAND/ΜL (ref 0–0.61)
EOSINOPHIL NFR BLD AUTO: 3 % (ref 0–6)
ERYTHROCYTE [DISTWIDTH] IN BLOOD BY AUTOMATED COUNT: 12.9 % (ref 11.6–15.1)
EST. AVERAGE GLUCOSE BLD GHB EST-MCNC: 105 MG/DL
GFR SERPL CREATININE-BSD FRML MDRD: 118 ML/MIN/1.73SQ M
GLUCOSE P FAST SERPL-MCNC: 88 MG/DL (ref 65–99)
GLUCOSE SERPL-MCNC: 88 MG/DL (ref 65–140)
HBA1C MFR BLD: 5.3 %
HCT VFR BLD AUTO: 45.5 % (ref 34.8–46.1)
HDLC SERPL-MCNC: 40 MG/DL
HGB BLD-MCNC: 14.8 G/DL (ref 11.5–15.4)
IMM GRANULOCYTES # BLD AUTO: 0.02 THOUSAND/UL (ref 0–0.2)
IMM GRANULOCYTES NFR BLD AUTO: 0 % (ref 0–2)
LDLC SERPL CALC-MCNC: 112 MG/DL (ref 0–100)
LYMPHOCYTES # BLD AUTO: 2.22 THOUSANDS/ΜL (ref 0.6–4.47)
LYMPHOCYTES NFR BLD AUTO: 36 % (ref 14–44)
MAGNESIUM SERPL-MCNC: 2 MG/DL (ref 1.9–2.7)
MCH RBC QN AUTO: 27.6 PG (ref 26.8–34.3)
MCHC RBC AUTO-ENTMCNC: 32.5 G/DL (ref 31.4–37.4)
MCV RBC AUTO: 85 FL (ref 82–98)
MONOCYTES # BLD AUTO: 0.59 THOUSAND/ΜL (ref 0.17–1.22)
MONOCYTES NFR BLD AUTO: 9 % (ref 4–12)
NEUTROPHILS # BLD AUTO: 3.21 THOUSANDS/ΜL (ref 1.85–7.62)
NEUTS SEG NFR BLD AUTO: 52 % (ref 43–75)
NONHDLC SERPL-MCNC: 130 MG/DL
NRBC BLD AUTO-RTO: 0 /100 WBCS
P AXIS: 62 DEGREES
PLATELET # BLD AUTO: 460 THOUSANDS/UL (ref 149–390)
PMV BLD AUTO: 9.7 FL (ref 8.9–12.7)
POTASSIUM SERPL-SCNC: 4.1 MMOL/L (ref 3.5–5.3)
PR INTERVAL: 158 MS
PROT SERPL-MCNC: 6.6 G/DL (ref 6.4–8.4)
QRS AXIS: 55 DEGREES
QRSD INTERVAL: 80 MS
QT INTERVAL: 422 MS
QTC INTERVAL: 424 MS
RBC # BLD AUTO: 5.37 MILLION/UL (ref 3.81–5.12)
SODIUM SERPL-SCNC: 138 MMOL/L (ref 135–147)
T WAVE AXIS: 31 DEGREES
TRIGL SERPL-MCNC: 88 MG/DL
TSH SERPL DL<=0.05 MIU/L-ACNC: 1.31 UIU/ML (ref 0.45–4.5)
VENTRICULAR RATE: 61 BPM
WBC # BLD AUTO: 6.25 THOUSAND/UL (ref 4.31–10.16)

## 2024-10-18 PROCEDURE — 80061 LIPID PANEL: CPT

## 2024-10-18 PROCEDURE — 99223 1ST HOSP IP/OBS HIGH 75: CPT | Performed by: STUDENT IN AN ORGANIZED HEALTH CARE EDUCATION/TRAINING PROGRAM

## 2024-10-18 PROCEDURE — 82306 VITAMIN D 25 HYDROXY: CPT

## 2024-10-18 PROCEDURE — 83036 HEMOGLOBIN GLYCOSYLATED A1C: CPT

## 2024-10-18 PROCEDURE — 82607 VITAMIN B-12: CPT

## 2024-10-18 PROCEDURE — 93005 ELECTROCARDIOGRAM TRACING: CPT

## 2024-10-18 PROCEDURE — 84443 ASSAY THYROID STIM HORMONE: CPT

## 2024-10-18 PROCEDURE — 83735 ASSAY OF MAGNESIUM: CPT

## 2024-10-18 PROCEDURE — 80053 COMPREHEN METABOLIC PANEL: CPT

## 2024-10-18 PROCEDURE — 93010 ELECTROCARDIOGRAM REPORT: CPT | Performed by: INTERNAL MEDICINE

## 2024-10-18 PROCEDURE — 85025 COMPLETE CBC W/AUTO DIFF WBC: CPT

## 2024-10-18 PROCEDURE — 99253 IP/OBS CNSLTJ NEW/EST LOW 45: CPT | Performed by: PHYSICIAN ASSISTANT

## 2024-10-18 RX ORDER — DIVALPROEX SODIUM 500 MG/1
500 TABLET, FILM COATED, EXTENDED RELEASE ORAL DAILY
Status: DISCONTINUED | OUTPATIENT
Start: 2024-10-18 | End: 2024-10-20 | Stop reason: HOSPADM

## 2024-10-18 RX ORDER — MIRTAZAPINE 15 MG/1
15 TABLET, FILM COATED ORAL
Status: DISCONTINUED | OUTPATIENT
Start: 2024-10-18 | End: 2024-10-20 | Stop reason: HOSPADM

## 2024-10-18 RX ORDER — LANOLIN ALCOHOL/MO/W.PET/CERES
3 CREAM (GRAM) TOPICAL
Status: DISCONTINUED | OUTPATIENT
Start: 2024-10-18 | End: 2024-10-20 | Stop reason: HOSPADM

## 2024-10-18 RX ADMIN — MIRTAZAPINE 15 MG: 15 TABLET, FILM COATED ORAL at 21:56

## 2024-10-18 RX ADMIN — DIVALPROEX SODIUM 500 MG: 500 TABLET, EXTENDED RELEASE ORAL at 11:31

## 2024-10-18 NOTE — PROGRESS NOTES
10/18/24 0824   Team Meeting   Meeting Type Daily Rounds   Team Members Present   Team Members Present Physician;Nurse;;Other (Discipline and Name)   Physician Team Member Dr. Renteria / Dr. Franco / VLADISLAV Rocha / [place] Student   Nursing Team Member Aaliyah / Andrea   Care Management Team Member Luis / Art / Roldan   Other (Discipline and Name) Sigmund - Group Facilitator   Patient/Family Present   Patient Present No   Patient's Family Present No     Treatment Team Rounds Completed  Medical and Psychiatric Review Completed    Pt is new admission - 201 from [place] ED. Pt reported SI. Is on House Arrest and probation.     D/C: Sunday - pt signed 72 hour notice on 10/17/24 @ 9124

## 2024-10-18 NOTE — CASE MANAGEMENT
CM [facility] ([phone]) and left VM asking for a call back to schedule pt for MH OP therapy and medication management. CM informed them that pt will dc on Sunday.     CM sent Community CM referral to Peg at [facility] and let them know that pt will dc on Sunday and asked them to reach out to pt upon dc for their CCM services.     NACHO called pt's  Kenny Robins to inform him of pt's dc plan for Jeb 10/20/24.   CM emailed PO with admission/dc letter for his records. [email or web address]      CM provided pt with hospital admission note showing the dates she was hospitalized and reviewed OP referrals and Community CM referral to [facility]. Pt verbalized understanding.    4:10pm   CM met with pt to check in and let her know that CM spoke to PO and that he asked pt to check in with him on Monday.     Pt reported she tried to call brother about him picking her up on Sunday for dc and that he did not answer as he is working but pt reported she knows that he will be able to pick her up. She will contact him again this evening to confirm dc plan for Sunday.

## 2024-10-18 NOTE — PROGRESS NOTES
Diagnosis of Bipolar 1 disorder reviewed.   Short term goals for decrease in depressive symptoms, decrease in anxiety symptoms, decrease in suicidal thoughts, improvement in insight, improvement in self care, sleep improvement, improvement in appetite, mood stabilization discussed.   All parties in agreement and treatment plan signed.    10/18/24 7200   Team Meeting   Meeting Type Tx Team Meeting   Team Members Present   Team Members Present Physician;Nurse;   Physician Team Member Dr. Renteria   Nursing Team Member [facility]   Care Management Team Member Luis   Patient/Family Present   Patient Present Yes   Patient's Family Present No

## 2024-10-18 NOTE — ASSESSMENT & PLAN NOTE
Subjective:      Patient ID: Ora Henderson is a 58 y.o. female.    Chief Complaint: UTI sx    Dysuria   This is a new problem. The current episode started yesterday. The problem occurs intermittently. The problem has been unchanged. The quality of the pain is described as burning. The pain is at a severity of 1/10. The patient is experiencing no pain. There has been no fever. The fever has been present for 1 - 2 days. She is sexually active. There is no history of pyelonephritis. Associated symptoms include flank pain, frequency, hesitancy and urgency. Pertinent negatives include no behavior changes, chills, discharge, hematuria, nausea, possible pregnancy, sweats, vomiting, weight loss, constipation, rash or withholding. She has tried nothing for the symptoms. The treatment provided no relief. Her past medical history is significant for genitourinary reflux and hypertension. There is no history of catheterization, diabetes insipidus, diabetes mellitus, kidney stones, recurrent UTIs, a single kidney, STD, urinary stasis or a urological procedure.   -admits to frequently yeast infections with antibiotics  -also reports some mild URI symptoms that started a few days ago. Sore throat, runny nose, postnasal drip, productive cough. No fever, chest pain, loss of smell/taste, or shortness of breath. No diarrhea.     Patient Active Problem List   Diagnosis    DJD (degenerative joint disease), cervical-mild    Hyperlipidemia    Hepatomegaly    Family history of cardiovascular disease    GERD (gastroesophageal reflux disease)    Suspected sleep apnea    History of benign pituitary tumor    Hx of colonic polyp    Essential hypertension    Osteoarthritis of spine with radiculopathy, lumbar region    Abnormal ECG    Class 1 obesity due to excess calories without serious comorbidity with body mass index (BMI) of 34.0 to 34.9 in adult    NATALIE (obstructive sleep apnea)       Current Outpatient  Patient is medically stable to continue inpatient psychiatric treatment.  Contact [facility] with any questions or concerns.   Medications:     amLODIPine (NORVASC) 2.5 MG tablet, Take 1 tablet (2.5 mg total) by mouth once daily., Disp: 30 tablet, Rfl: 11    aspirin 81 MG Chew, Take 81 mg by mouth as needed. Hold 5 days prior to surgery, Disp: , Rfl:     esomeprazole (NEXIUM) 40 MG capsule, Take 1 capsule (40 mg total) by mouth before breakfast., Disp: 90 capsule, Rfl: 3    fluconazole (DIFLUCAN) 150 MG Tab, Take one tablet and repeat dose in 3 days, Disp: 3 tablet, Rfl: 1    fluticasone propionate (FLONASE) 50 mcg/actuation nasal spray, 2 sprays (100 mcg total) by Each Nostril route once daily., Disp: 16 g, Rfl: 0    L.acidophilus-Bif. animalis (PROBIOTIC) 5 billion cell CpSP, Take 1 tablet by mouth once daily., Disp: 10 capsule, Rfl: 0    meloxicam (MOBIC) 15 MG tablet, Take 1 tablet (15 mg total) by mouth daily as needed., Disp: 30 tablet, Rfl: 0    montelukast (SINGULAIR) 10 mg tablet, Take 1 tablet (10 mg total) by mouth nightly., Disp: 30 tablet, Rfl: 2    MULTIVIT,CALC,MINS/IRON/FOLIC (DAILY MULTIPLE FOR WOMEN ORAL), Take 1 tablet by mouth once daily. Hold 5 days prior to surgery, Disp: , Rfl:     nystatin (MYCOSTATIN) ointment, Apply topically 2 (two) times daily., Disp: 15 g, Rfl: 1    omega-3 fatty acids/fish oil (FISH OIL-OMEGA-3 FATTY ACIDS) 300-1,000 mg capsule, Take by mouth once daily. Hold 5 days prior to surgery, Disp: , Rfl:     rosuvastatin (CRESTOR) 40 MG Tab, Take 1 tablet (40 mg total) by mouth once daily., Disp: 90 tablet, Rfl: 0    doxycycline (MONODOX) 100 MG capsule, Take 1 capsule (100 mg total) by mouth 2 (two) times daily. Do not take with milk or yogurt for 7 days, Disp: 14 capsule, Rfl: 0    losartan-hydrochlorothiazide 100-25 mg (HYZAAR) 100-25 mg per tablet, Take 1 tablet by mouth once daily. (Patient taking differently: Take 1 tablet by mouth once daily. Hold am of surgery), Disp: 90 tablet, Rfl: 3  No current facility-administered medications for this visit.     Facility-Administered  "Medications Ordered in Other Visits:     lidocaine (PF) 10 mg/ml (1%) injection 5 mg, 0.5 mL, Subcutaneous, Once, Sung Warner MD      Review of Systems   Constitutional: Negative for activity change, appetite change, chills, diaphoresis, fatigue, fever, unexpected weight change and weight loss.   HENT: Positive for congestion, postnasal drip, rhinorrhea and sore throat. Negative for hearing loss, mouth sores, sinus pressure, sinus pain, sneezing, tinnitus, trouble swallowing and voice change.    Eyes: Negative.  Negative for visual disturbance.   Respiratory: Positive for cough. Negative for choking, chest tightness and shortness of breath.    Cardiovascular: Negative for chest pain, palpitations and leg swelling.   Gastrointestinal: Negative for abdominal distention, abdominal pain, blood in stool, constipation, diarrhea, nausea and vomiting.   Endocrine: Negative for cold intolerance, heat intolerance, polydipsia and polyuria.   Genitourinary: Positive for dysuria, flank pain, frequency, hesitancy and urgency. Negative for difficulty urinating and hematuria.   Musculoskeletal: Negative for arthralgias, back pain, gait problem, joint swelling and myalgias.   Skin: Negative for color change, pallor, rash and wound.   Neurological: Negative for dizziness, tremors, weakness, light-headedness, numbness and headaches.   Hematological: Negative for adenopathy.   Psychiatric/Behavioral: Negative for behavioral problems, confusion, self-injury, sleep disturbance and suicidal ideas. The patient is not nervous/anxious.      Objective:   /88 (BP Location: Right arm, Patient Position: Sitting)   Pulse 80   Temp 98.8 °F (37.1 °C) (Tympanic)   Resp 16   Ht 5' 3" (1.6 m)   Wt 89.1 kg (196 lb 6.9 oz)   BMI 34.80 kg/m²     Physical Exam  Vitals signs reviewed.   Constitutional:       General: She is not in acute distress.     Appearance: Normal appearance. She is well-developed. She is not ill-appearing, " toxic-appearing or diaphoretic.   HENT:      Head: Normocephalic and atraumatic.      Right Ear: External ear normal.      Left Ear: External ear normal.      Nose: Congestion and rhinorrhea present.      Mouth/Throat:      Pharynx: No oropharyngeal exudate or posterior oropharyngeal erythema.   Eyes:      Conjunctiva/sclera: Conjunctivae normal.      Pupils: Pupils are equal, round, and reactive to light.   Neck:      Musculoskeletal: Normal range of motion.   Cardiovascular:      Rate and Rhythm: Normal rate and regular rhythm.      Heart sounds: Normal heart sounds. No murmur. No friction rub. No gallop.    Pulmonary:      Effort: Pulmonary effort is normal. No respiratory distress.      Breath sounds: Normal breath sounds. No wheezing or rales.   Chest:      Chest wall: No tenderness.   Abdominal:      General: Bowel sounds are normal. There is no distension.      Palpations: Abdomen is soft. Abdomen is not rigid. There is no fluid wave, hepatomegaly, splenomegaly, mass or pulsatile mass.      Tenderness: There is abdominal tenderness in the suprapubic area. There is no guarding or rebound.   Musculoskeletal: Normal range of motion.   Skin:     General: Skin is warm.      Findings: No rash.   Neurological:      Mental Status: She is alert and oriented to person, place, and time.   Psychiatric:         Behavior: Behavior normal.         Thought Content: Thought content normal.         Judgment: Judgment normal.         Assessment:     1. Dysuria    2. Acute vulvitis    3. Essential hypertension      Plan:   Dysuria  -     Urinalysis; Future; Expected date: 11/27/2020  -     Urine culture; Future  -     doxycycline (MONODOX) 100 MG capsule; Take 1 capsule (100 mg total) by mouth 2 (two) times daily. Do not take with milk or yogurt for 7 days  Dispense: 14 capsule; Refill: 0  -increase fluids    Acute vulvitis  -     fluconazole (DIFLUCAN) 150 MG Tab; Take one tablet and repeat dose in 3 days  Dispense: 3 tablet;  Refill: 1    Essential hypertension  -Stable and controlled. Continue current treatment plan as previously prescribed with your PCP.       Follow up if symptoms worsen or fail to improve.

## 2024-10-18 NOTE — TREATMENT PLAN
TREATMENT PLAN REVIEW - Behavioral Health Janenemichael Veloz 34 y.o. 1990 female MRN: [id number]    [facility] Encounter: [id number]          Admit Date/Time:  10/17/2024  2:40 PM    Treatment Team:   MD Meli Donato, TU Wells, LINDA Ridley RN Victoria Bresnan, TU Fischer, VLADISLAV Dial    Diagnosis: Principal Problem:    Bipolar 1 disorder ([facility])  Active Problems:    Mild intermittent asthma    Alcoholism in remission ([facility])    Medical clearance for psychiatric admission      Patient Strengths/Assets: cooperative, motivation for treatment/growth, patient is on a voluntary commitment    Patient Barriers/Limitations: difficulty adapting, financial instability, noncompliant with medication, noncompliant with treatment, substance abuse    Short Term Goals: decrease in depressive symptoms, decrease in anxiety symptoms, decrease in suicidal thoughts, improvement in insight, improvement in self care, sleep improvement, improvement in appetite, mood stabilization    Long Term Goals: improvement in depression, improvement in anxiety, resolution of depressive symptoms, resolution of manic symptoms, stabilization of mood, free of suicidal thoughts, improved insight, acceptance of need for psychiatric medications, acceptance of need for psychiatric treatment, adequate sleep, adequate appetite    Progress Towards Goals: starting psychiatric medications as prescribed, improving, progressing, attends groups, mood is stabilizing, less anxious, less depressed, less labile    Recommended Treatment: medication management, patient medication education, group therapy, milieu therapy, continued Behavioral Health psychiatric evaluation/assessment process    Treatment Frequency: daily  medication monitoring, group and milieu therapy daily, monitoring through interdisciplinary rounds, monitoring through weekly patient care conferences    Expected Discharge Date:  3 days    Discharge Plan: referral for outpatient medication management with a psychiatrist, referral for outpatient psychotherapy    Treatment Plan Created/Updated By: Verónica Renteria MD

## 2024-10-18 NOTE — CONSULTS
Consultation - Hospitalist   Name: Janene Veloz 34 y.o. female I MRN: [id number]  Unit/Bed#: -01 I Date of Admission: 10/17/2024   Date of Service: 10/18/2024 I Hospital Day: 1   Inpatient consult for Medical Clearance for  patient  Consult performed by: Amando Pang PA-C  Consult ordered by: JIMBO Foy        Physician Requesting Evaluation: Verónica Lambert*   Reason for Evaluation / Principal Problem: medical clearance    Assessment & Plan  Bipolar 1 disorder (HCC)  On 201 as patient threatened to walk into traffic  Has been off his meds as he is homeless and on house arrest  Management per psychiatry team  Mild intermittent asthma  No acute exacerbation  Monitor resp status  Alcoholism in remission (HCC)  Encourage continued cessation  Medical clearance for psychiatric admission  Patient is medically stable to continue inpatient psychiatric treatment.  Contact [facility] with any questions or concerns.    Counseling / Coordination of Care Time: 30 minutes.  Greater than 50% of total time spent on patient counseling and coordination of care.      History of Present Illness:    Janene Veloz is a 34 y.o. female who is originally admitted to the Psychiatry service due to bipolar disorder. We are consulted for medical clearance. Patient should continue all prior to admission medications as prescribed by primary care provider/outpatient specialists.   Available admission lab work and vitals are acceptable.  Patient feels a baseline physical health.  Patient appears medically stable for inpatient psychiatric treatment at this time. Please contact [facility] with any medical questions or concerns if issues should arise.     Review of Systems:    ROS unable to be preformed due to psychiatric disorder.    Past Medical and Surgical History:     Past Medical History:   Diagnosis Date    Anemia due to chronic blood loss     has had recent iron infusions    Anxiety     Asthma      "Bipolar disorder (HCC)     Depression     Dry cough     pt states allergy related    Environmental and seasonal allergies     Fibroid     Fibroid uterus 1/14/2019    Food allergy     raisins    Varicella     Wears contact lenses        Past Surgical History:   Procedure Laterality Date    NO PAST SURGERIES      AL SALPINGECTOMY COMPLETE/PARTIAL UNI/BI SPX Bilateral 1/31/2019    Procedure: SALPINGECTOMY;  Surgeon: Emilee Hernandez MD;  [facility];  Service: Gynecology    AL TOTAL ABDOMINAL HYSTERECT W/WO RMVL TUBE OVARY N/A 1/31/2019    Procedure: HYSTERECTOMY TOTAL ABDOMINAL (ANTONIO);  Surgeon: Emilee Hernandez MD;  Location: [facility];  Service: Gynecology       Meds/Allergies:    all medications and allergies reviewed    Allergies:   Allergies   Allergen Reactions    Amoxicillin Tongue Swelling and Facial Swelling    Penicillins      Unsure of reaction     Other      raisins       Social History:     Marital Status: Single    Substance Use History:   Social History     Substance and Sexual Activity   Alcohol Use Not Currently    Alcohol/week: 4.0 - 5.0 standard drinks of alcohol    Types: 4 - 5 Cans of beer per week    Comment: rarely     Social History     Tobacco Use   Smoking Status Never    Passive exposure: Never   Smokeless Tobacco Never     Social History     Substance and Sexual Activity   Drug Use Yes    Types: Marijuana    Comment: daily       Family History:    non-contributory    Physical Exam:     Vitals:   Blood Pressure: 120/90 (10/18/24 0736)  Pulse: 59 (10/18/24 0736)  Temperature: (!) 97.1 °F (36.2 °C) (10/18/24 0736)  Temp Source: Tympanic (10/18/24 0736)  Respirations: 17 (10/18/24 0736)  Height: 5' 5\" (165.1 cm) (10/17/24 1445)  Weight - Scale: 103 kg (226 lb 9.6 oz) (10/17/24 1445)  SpO2: 98 % (10/18/24 0736)    Physical Exam  Constitutional:       General: She is not in acute distress.     Appearance: Normal appearance.   HENT:      Head: Normocephalic.      Nose: Nose normal.      " Mouth/Throat:      Mouth: Mucous membranes are moist.      Pharynx: Oropharynx is clear.   Eyes:      General: No scleral icterus.     Extraocular Movements: Extraocular movements intact.      Conjunctiva/sclera: Conjunctivae normal.      Pupils: Pupils are equal, round, and reactive to light.   Cardiovascular:      Rate and Rhythm: Normal rate.      Heart sounds: No murmur heard.     No friction rub. No gallop.   Pulmonary:      Effort: Pulmonary effort is normal. No respiratory distress.      Breath sounds: Normal breath sounds. No stridor. No wheezing, rhonchi or rales.   Abdominal:      General: Bowel sounds are normal. There is no distension.      Palpations: Abdomen is soft.      Tenderness: There is no abdominal tenderness. There is no guarding.   Musculoskeletal:         General: No deformity. Normal range of motion.      Cervical back: Neck supple.      Right lower leg: No edema.      Left lower leg: No edema.   Skin:     General: Skin is warm and dry.      Coloration: Skin is not jaundiced.   Neurological:      General: No focal deficit present.      Mental Status: She is alert and oriented to person, place, and time. Mental status is at baseline.      Cranial Nerves: Cranial nerves 2-12 are intact. No cranial nerve deficit.           Additional Data:     Lab Results: I have personally reviewed pertinent reports.          Results from last 7 days   Lab Units 10/18/24  0610   SODIUM mmol/L 138   POTASSIUM mmol/L 4.1   CHLORIDE mmol/L 107   CO2 mmol/L 24   BUN mg/dL 10   CREATININE mg/dL 0.59*   ANION GAP mmol/L 7   CALCIUM mg/dL 8.6   ALBUMIN g/dL 3.7   TOTAL BILIRUBIN mg/dL 0.57   ALK PHOS U/L 58   ALT U/L 15   AST U/L 12*   GLUCOSE RANDOM mg/dL 88             Lab Results   Component Value Date/Time    HGBA1C 5.4 03/11/2022 07:08 AM    HGBA1C 5.1 01/17/2019 09:24 AM               Imaging: I have personally reviewed pertinent reports.      No orders to display       EKG, Pathology, and Other Studies  Reviewed on Admission:   Prior pertinent studies and records reviewed in [facility]/Care Everywhere.    ** Please Note: This note has been constructed using a voice recognition system. **

## 2024-10-18 NOTE — ASSESSMENT & PLAN NOTE
Start Depakote  mg daily, valproic acid level for Sunday   Remeron 15 mg at bedtime  Hydroxyzine as needed for anxiety  Patient signed 72 hour notice and wants to leave on Sunday and return work on Monday.  She does not want to rescind at this time.  Patient does not have any 302 criteria at this time.

## 2024-10-18 NOTE — DISCHARGE INSTR - OTHER ORDERS
[facility] CRISIS INFORMATION   [facility] is a confidential 7 days/week telephone support service manned by trained mental health consumers.  [employer]armline operates daily but is not able to accept calls between 2AM-6AM.   [facility] provides support, a listening ear and can provide information about available services. [facility] specializes in the concerns of mental health consumers, their families and friends.  However, we are also here for anyone who has a mental health concern, is confused about or just doesn't know anything about mental health or where to get information. To reach [facility], call 24 hours a day [phone]   Text CONNECT to 097054 from anywhere in the [place], anytime, about any type of crisis.  A live, trained Crisis Counselor receives the text and lets you know that they are here to listen.  The volunteer Crisis Counselor will help you move from a hot moment to a cool moment.  [facility] Crisis Intervention - licensed telephone and mobile crisis services that provide mental health assessments to all age groups regardless of income or insurance.  Crisis Intervention operates 24-hour/7 days a week. [facility] Crisis Intervention assists consumer who are experiencing a mental health emergency and lack the resources to assist themselves.  Immediate intervention for suicidal and depressed individuals with home visits/outreach being top priority. Crisis can be contacted at [phone].   The [employer] [place] [employer] (ONDINA) offers various education & support groups for you & your family.  For more information visit their website at   http://www.ondina-lv.org/.  Dial 2-1-1 to get connected/get help.  Free, confidential information & referral available 24/7: Aging Services, Child & Youth Services, Counseling, Education/Training, Food/Shelter/Clothing, Health Services, Parenting, Substance Abuse, Support Groups, Volunteer Opportunities, & much more.  Phone: 2-1-1 or 550-189-6488, Web:  www.eu414dznx.org, Email: [email or web address]

## 2024-10-18 NOTE — CMS CERTIFICATION NOTE
Recertification: Based upon physical, mental and social evaluations, I certify that inpatient psychiatric services continue to be medically necessary for this patient for a duration of 7 midnights for the treatment of  Bipolar 1 disorder (HCC) Available alternative community resources still do not meet the patient's mental health care needs. I further attest that an established written individualized plan of care has been updated and is outlined in the patient's medical records.

## 2024-10-18 NOTE — DISCHARGE INSTR - APPOINTMENTS
You will be discharged to your brother's home in [place].   You confirmed that your cell phone number is [phone]          Behavioral Health Nurse Navigator, Karla or Kaylee will be calling you after your discharge, on the phone number that you provided.  They will be available as an additional support, if needed.   If you wish to speak with Karla, you may contact her at [phone].

## 2024-10-18 NOTE — H&P
"Psychiatric Evaluation - Behavioral Health   Name: Janene Veloz 34 y.o. female I MRN: [id number]  Unit/Bed#: -01 I Date of Admission: 10/17/2024   Date of Service: 10/18/2024 I Hospital Day: 1     Assessment & Plan  Bipolar 1 disorder (HCC)  Start Depakote  mg daily, valproic acid level for Sunday   Remeron 15 mg at bedtime  Hydroxyzine as needed for anxiety  Patient signed 72 hour notice and wants to leave on Sunday and return work on Monday.  She does not want to rescind at this time.  Patient does not have any 302 criteria at this time.   Mild intermittent asthma    Alcoholism in remission (HCC)    Medical clearance for psychiatric admission       Admit to [facility] on 201 status for safety and treatment  No 1:1 continuous observation needed at this time, as patient feels safe on the unit.  Check admission labs.  Get collaterals.  Collaborate with family for baseline assessment and disposition planning.  Case discussed with treatment team.    Chief Complaint: \" I was overwhelmed\"    History of Present Illness       Per Crisis worker on 10/17:\"Patient is a 34 yr old female with a hx of mental health and substance use disorder. She arrived in the ED with depression and suicidal thoughts, and a plan to jump in front of a car or bus. She reports that she has been homeless for the last 4 months, occasionally staying in her girlfriend's car, but has been unable to find any stable, affordable housing. She is employed at a warehouse. She states that she has no family to stay with. She reports that she is suicidal with a plan to jump in fromt of a bus or car. Mood is depressed, anxious, feeling helpless and hopeless. She has been unable to find help. She is currently on house arrest for a DUI. (second offense). She no longer drinks alcohol, and in the past used cocaine but not for years. No over psychosis. she is calm and cooperative, with poor eye contact. Patient signed a 201 for " "admission.   Patient currently has no providers.  She states that she had a suicide attempt at age 16, an overdose attempt, and was hospitalized at [facility].  She was on depakote and welbutrin but has not taken any meds for years.   Roxanna CARLOSW\"    This is 34-year-old female with history of bipolar disorder/depression/anxiety and alcohol use admitted to inpatient unit on 201 for worsening of mood and suicidal ideations with plan to jump in front of a car in the context of psychosocial stressors.  Patient reports long history of bipolar disorder since the age of 16 and was on Depakote and Wellbutrin.  However stopped few months ago as she ran out of medications and does not have any providers.  Patient endorses depressed mood, anhedonia, mood swings, racing thoughts, impulsive behaviors, poor sleep, fluctuations in energy and motivation.  Denies any symptoms of psychosis.  Denies any thoughts to hurt herself or others.   Psychiatric Review Of Systems:  Medication side effects: none  Sleep: Poor  Appetite: no change  Hygiene: able to tend to instrumental and basic ADLs  Anxiety: Yes  Psychotic Symptoms: denies  Depression Symptoms: Yes  Manic Symptoms: Yes  PTSD Symptoms: denies  Obsession/compulsions: Denies  Eating disorders: Denies  Suicidal Thoughts: denies  Homicidal Thoughts: denies    Medical Review Of Systems:   Complete ROS is negative, except as noted above.    Scheduled medications:  Current Facility-Administered Medications   Medication Dose Route Frequency Provider Last Rate    acetaminophen  650 mg Oral Q6H PRN Leana Alejandrina Nighat, CRNP      acetaminophen  650 mg Oral Q4H PRN Leana Alejandrina Nighat, CRNP      acetaminophen  975 mg Oral Q6H PRN Leana Alejandrina Nighat, CRNP      aluminum-magnesium hydroxide-simethicone  30 mL Oral Q4H PRN Leana Alejandrina Point, CRNP      benztropine  1 mg Intramuscular Q4H PRN Max 6/day Leana Alejandrina Nighat, CRNP      benztropine  1 mg Oral Q4H PRN Max 6/day " JIMBO Foy      bisacodyl  10 mg Rectal Daily PRN JIMBO Foy      hydrOXYzine HCL  50 mg Oral Q6H PRN Max 4/day JIMBO Foy      Or    diphenhydrAMINE  50 mg Intramuscular Q6H PRN JIMBO Foy      divalproex sodium  500 mg Oral Daily [facility] MD [place]      hydrOXYzine HCL  100 mg Oral Q6H PRN Max 4/day JIMBO Foy      Or    LORazepam  2 mg Intramuscular Q6H PRN JIMBO Foy      hydrOXYzine HCL  25 mg Oral Q6H PRN Max 4/day JIMBO Foy      melatonin  3 mg Oral HS PRN [facility] MD [place]      mirtazapine  15 mg Oral HS [facility] MD [place]      OLANZapine  5 mg Oral Q4H PRN Max 3/day JIMBO Foy      Or    OLANZapine  2.5 mg Intramuscular Q4H PRN Max 3/day JIMBO Foy      OLANZapine  5 mg Oral Q3H PRN Max 3/day JIMBO Foy      Or    OLANZapine  5 mg Intramuscular Q3H PRN Max 3/day JIMBO Foy      OLANZapine  2.5 mg Oral Q4H PRN Max 6/day JIMBO Foy      polyethylene glycol  17 g Oral Daily PRN JIMBO oFy      propranolol  10 mg Oral Q8H PRN JMIBO Foy      senna-docusate sodium  1 tablet Oral Daily PRN JIMBO Foy          PRN:    acetaminophen    acetaminophen    acetaminophen    aluminum-magnesium hydroxide-simethicone    benztropine    benztropine    bisacodyl    hydrOXYzine HCL **OR** diphenhydrAMINE    hydrOXYzine HCL **OR** LORazepam    hydrOXYzine HCL    melatonin    OLANZapine **OR** OLANZapine    OLANZapine **OR** OLANZapine    OLANZapine    polyethylene glycol    propranolol    senna-docusate sodium    Diet:       Diet Orders   (From admission, onward)                 Start     Ordered    10/17/24 1446  Diet Regular; Regular House  Diet effective now        References:    Adult Nutrition Support Algorithm    RD Therapeutic Diet  "Order Protocol   Question Answer Comment   Diet Type Regular    Regular Regular House    RD to adjust diet per protocol? No        10/17/24 1445                     - Observation: routine            - VS: as per unit protocol  - Legal status: 201  - Psychoeducation (benefits and potential risks) discussed, importance of compliance with the psychiatric treatment reiterated, and the patient verbalized understanding of the matter  - Encourage group attendance and milieu therapy   - The pt was educated and agreed to verbalize any suicidal thoughts, frustrations or concerns to the nursing staff, immediately.   - Dispo: To be determined            Historical Information     Psychiatric History:   Psychiatry diagnosis: Bipolar disorder  Inpatient Hx: Yes, 3 times so far  Suicidal Hx: History of overdose on pills at 16  Self harming behavior Hx: Denies  Violent behavior Hx: Denies  Access to firearms: Denies  Outpatient Hx: None currently  Medications/Trials: Wellbutrin-not helpful, worsened her anxiety and mood. Depakote-helpful      Substance Abuse History:    Reports history of heroin cannabis fentanyl meth and alcohol use \" I used every possible drug\" sober for a year now.      Social History     Substance and Sexual Activity   Alcohol Use Not Currently    Alcohol/week: 4.0 - 5.0 standard drinks of alcohol    Types: 4 - 5 Cans of beer per week    Comment: rarely     Social History     Substance and Sexual Activity   Drug Use Yes    Types: Marijuana    Comment: daily       Family Psychiatric History:   Family History   Problem Relation Age of Onset    Colon cancer Father          age 44    Prostate cancer Father     Cirrhosis Father     Alcohol abuse Father     Sleep apnea Father     Cancer Maternal Grandmother     Other Mother         hysterectomy    No Known Problems Sister     No Known Problems Brother     Other Paternal Grandmother          during surgery    No Known Problems Brother     Breast cancer Neg " Hx     Ovarian cancer Neg Hx        Social History:  Social History     Socioeconomic History    Marital status: Single     Spouse name: Erika    Number of children: 0    Years of education: HS    Highest education level: Not on file   Occupational History    Occupation: unemployed   Tobacco Use    Smoking status: Never     Passive exposure: Never    Smokeless tobacco: Never   Vaping Use    Vaping status: Never Used   Substance and Sexual Activity    Alcohol use: Not Currently     Alcohol/week: 4.0 - 5.0 standard drinks of alcohol     Types: 4 - 5 Cans of beer per week     Comment: rarely    Drug use: Yes     Types: Marijuana     Comment: daily    Sexual activity: Yes     Partners: Female     Birth control/protection: Female Sterilization     Comment: lifetime sexual partners: >50; current partner: 4 years; Pt reports she has had sex with men, but has been only have sex with women since age 14   Other Topics Concern    Not on file   Social History Narrative    Presybeterian: no preference    Accepts blood products        Exercise: none    Calcium: occ cheese     Social Determinants of Health     Financial Resource Strain: Low Risk  (1/31/2024)    Overall Financial Resource Strain (CARDIA)     Difficulty of Paying Living Expenses: Not hard at all   Food Insecurity: No Food Insecurity (1/31/2024)    Hunger Vital Sign     Worried About Running Out of Food in the Last Year: Never true     Ran Out of Food in the Last Year: Never true   Transportation Needs: No Transportation Needs (1/31/2024)    PRAPARE - Transportation     Lack of Transportation (Medical): No     Lack of Transportation (Non-Medical): No   Physical Activity: Not on file   Stress: Not on file   Social Connections: Not on file   Intimate Partner Violence: Not on file   Housing Stability: Not on file       Education:HS  Learning Disabilities denies  Living arrangement: Yes  Occupational History: Employed  Functioning Relationships: Yes  Legal issues: DUI,  house arrest   hx:None      Traumatic History:   Abuse:Denies  Other Traumatic Events: None    Past Medical History:   Diagnosis Date    Anemia due to chronic blood loss     has had recent iron infusions    Anxiety     Asthma     Bipolar disorder (HCC)     Depression     Dry cough     pt states allergy related    Environmental and seasonal allergies     Fibroid     Fibroid uterus 1/14/2019    Food allergy     raisins    Varicella     Wears contact lenses        Medical Review Of Systems:  Pertinent items are noted in HPI; all others negative    Meds/Allergies   all current active meds have been reviewed  Allergies   Allergen Reactions    Amoxicillin Tongue Swelling and Facial Swelling    Penicillins      Unsure of reaction     Other      raisins       Objective      Mental Status Evaluation:  Appearance and attitude: appeared as stated age, cooperative and attentive, dressed in hospital attire  Eye contact: fair  Motor Function: within normal limits, intact gait, No PMA/PMR  Gait/station: normal gait/station  Speech: talking in soft tone, with normal latency and amount  Language: No overt abnormality  Mood/affect: depressed, anxious / Affect was constricted but reactive, mood congruent  Thought Processes: sequential and goal-directed, logical, coherent, organized, linear  Thought content: denies suicidal ideation or homicidal ideation; no delusions or first rank symptoms  Associations: intact associations  Perceptual disturbances: denies Auditory/Visual/Tactile Hallucinations  Orientation: oriented to time, person, place and to the situational context  Cognitive Function: intact  Memory: recent and remote memory grossly intact  Intellect: average  Fund of knowledge: aware of current events, aware of past history, and vocabulary average  Impulse control: fair  Insight/judgment: fair/fair      Lab results: I have personally reviewed all pertinent laboratory/tests results  Most Recent Labs:   Lab Results    Component Value Date    WBC 6.88 09/07/2024    RBC 5.01 09/07/2024    HGB 14.0 09/07/2024    HCT 42.6 09/07/2024     09/07/2024    RDW 14.0 09/07/2024    NEUTROABS 3.54 09/07/2024    SODIUM 138 10/18/2024    K 4.1 10/18/2024     10/18/2024    CO2 24 10/18/2024    BUN 10 10/18/2024    CREATININE 0.59 (L) 10/18/2024    GLUC 88 10/18/2024    CALCIUM 8.6 10/18/2024    AST 12 (L) 10/18/2024    ALT 15 10/18/2024    ALKPHOS 58 10/18/2024    TP 6.6 10/18/2024    ALB 3.7 10/18/2024    TBILI 0.57 10/18/2024    CHOLESTEROL 170 10/18/2024    HDL 40 (L) 10/18/2024    TRIG 88 10/18/2024    LDLCALC 112 (H) 10/18/2024    NONHDLC 130 10/18/2024    IZD1KTUVWTLJ 1.308 10/18/2024    PREGUR negative 11/16/2021    HCGQUANT <3 01/12/2019    HGBA1C 5.4 03/11/2022     03/11/2022       Imaging Studies: No results found.    EKG, Pathology, and Other Studies: Reviewed. and New EKG ordered.    Advance Directive and Living Will: <no information>    Patient Strengths/Assets: ability for insight, cooperative, motivation for treatment/growth, patient is on a voluntary commitment    Patient Barriers/Limitations: difficulty adapting, limited support system, noncompliant with medication, noncompliant with treatment    Suicide/Homicide Risk Assessment:    Risk of Harm to Self:   Nursing Suicide Risk Assessment Last 24 hours: C-SSRS Risk (Since Last Contact)  Calculated C-SSRS Risk Score (Since Last Contact): No Risk Indicated    Risk of Harm to Others:  Nursing Homicide Risk Assessment: Violence Risk to Others: Denies within past 6 months    The following interventions are recommended: Behavioral Health checks for safety monitoring, continued hospitalization on locked unit    Counseling / Coordination of Care:    Diagnosis, medication changes and treatment plan reviewed with patient.  Patient's presentation on admission and proposed treatment plan discussed with staff.  Events leading to admission reviewed with  patient.  Importance of medication and treatment compliance reviewed with patient.  Outpatient follow up discussed with patient.  Supportive therapy provided to patient.    Inpatient Psychiatric Certification:    Estimated length of stay: 3 midnights          This note was completed in part utilizing Dragon dictation Software. Grammatical, translation, syntax errors, random word insertions, spelling mistakes, and incomplete sentences may be an occasional consequence of this system secondary to software limitations with voice recognition, ambient noise, and hardware issues. If you have any questions or concerns about the content, text, or information contained within the body of this dictation, please contact the provider for clarification.

## 2024-10-18 NOTE — CASE MANAGEMENT
"INTAKE Pt signed 72 hour notice on 10/17/24 @ 1940  Pt will dc on Jeb 10/20/24   Pt reported her brother can pick her up     Readmit score:  25 Red   Confirmed Address   [address] [facility] [address]   [place] [address]    County: [place]     Resides in the home with/can return?:    -Pt is currently homeless   -Pt reported she and SO had been living in [place] with their Children but the landlord informed them that the house was being put up for sale and they were also behind on the rent so they needed to leave   -Pt reported SO is staying with her family and their children but pt unable to stay there.   -Pt reported she sometimes stays in SO's vehicle.  -Pt reported she owed money on a storage unit where all of her belongings are and she recently spoke to her boss about what she has been dealing with and boss paid the storage unit bill.    -Pt reported that she has family support and recently told them more about how she has been struggling and they have been supportive but they have their own families and lives.    Pt reported that she can stay with her brother for a period of time upon dc.      Confirmed Phone Number: [phone]    Commitment Status/Admitted from: 201 - [place] ED   Presenting C/O:             ED Crisis note:  \"    Patient is a 34 yr old female with a hx of mental health and substance use disorder. She arrived in the ED with depression and suicidal thoughts, and a plan to jump in front of a car or bus. She reports that she has been homeless for the last 4 months, occasionally staying in her girlfriend's car, but has been unable to find any stable, affordable housing. She is employed at a [employer]. She states that she has no family to stay with. She reports that she is suicidal with a plan to jump in fromt of a bus or car. Mood is depressed, anxious, feeling helpless and hopeless. She has been unable to find help. She is currently on house arrest for a DUI. (second offense). She no longer drinks " "alcohol, and in the past used cocaine but not for years. No over psychosis. she is calm and cooperative, with poor eye contact. Patient signed a 201 for admission.      Patient currently has no providers.  She states that she had a suicide attempt at age 16, an overdose attempt, and was hospitalized at [facility].  She was on depakote and welbutrin but has not taken any meds for years.\"          Outpatient:    Pt reported she has an appt on 12/2/24 with [facility] PCP.     Pt is not connected with  OP Provider but open to referral. CM spoke to her about [facility] in [place] and pt in agreement.    Pt also asked about D&A OP, CM reviewed [facility] in [place] and [facility] in [place]. PT reported she will follow up after dc.      ACT/ICM/CPS/WRT/SC: CM spoke to pt about [facility] CCM - Pt in agreement.    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      Legal/  Probation/[place] Ofc:    [facility] Probation   Pt is on House Arrest   PO- Kenny Gilliland ([phone])     Access to Firearms:    Denies   Referrals Needed: Community CM with [place] [facility]   Check in    Transport at Discharge:    Pt reported she has transportation home    IMM:   [place] Text JACEK:    Emergency Contact:     Erika Winston (Spouse) [phone]    ROIs obtained:       [place] [facility]  [facility] Probation   [facility]      Insurance:     [facility] Medicaid    Audit:        PAWSS:  BAT:  UDS: +THC         "

## 2024-10-18 NOTE — NURSING NOTE
"Pt was approached by staff in the day room for morning assessment. Pt was very pleasant and kind to speak with. Pt was asked how she slept and pt stated that \"I slept well and was given melatonin. It was the second time I've ever taken it and I slept really well\". Pt was asked about her appetite, to which she replied that her appetite has been good and that she ate everything. Pt was asked if she had any thought of harming herself or others pt replied \"I have no thoughts about hurting myself and it's really calming and refreshing.\". pt was asked if she has been hearing any voices or seeing any hallucinations to which she replied no. Then pt was asked if she had any questions or concerns for staff and she replied no.   "

## 2024-10-18 NOTE — ASSESSMENT & PLAN NOTE
On 201 as patient threatened to walk into traffic  Has been off his meds as he is homeless and on house arrest  Management per psychiatry team

## 2024-10-19 LAB — VIT B12 SERPL-MCNC: 281 PG/ML (ref 180–914)

## 2024-10-19 PROCEDURE — 99232 SBSQ HOSP IP/OBS MODERATE 35: CPT

## 2024-10-19 RX ADMIN — DIVALPROEX SODIUM 500 MG: 500 TABLET, EXTENDED RELEASE ORAL at 08:13

## 2024-10-19 RX ADMIN — MIRTAZAPINE 15 MG: 15 TABLET, FILM COATED ORAL at 21:37

## 2024-10-19 NOTE — TREATMENT TEAM
10/19/24 0800   Team Meeting   Meeting Type Daily Rounds   Team Members Present   Team Members Present Physician;Nurse   Physician Team Member Anastacia / Ata CHOI   Nursing Team Member Raúl   Patient/Family Present   Patient Present No   Patient's Family Present No     Visible, social, passive SI in evening, no plan/intent.     VPA level Sun. 10/20/24 at 0600    72hr signed 10/17/24 at 1940,  D/C scheduled for Jeb 10/20    Requests paper Rx

## 2024-10-19 NOTE — PROGRESS NOTES
Progress Note - Behavioral Health   Name: Janene Veloz 34 y.o. female I MRN: [id number]  Unit/Bed#: -01 I Date of Admission: 10/17/2024   Date of Service: 10/19/2024 I Hospital Day: 2    Assessment & Plan  Bipolar 1 disorder (HCC)  At this time all active medications have been reviewed 10/19/24.    No medication changes at this time. Continue current psychiatric medications as prescribed. Please see below for detailed medication list.    Continue Depakote  mg daily, valproic acid level for Jeb 10/20/24  Continue Remeron 15 mg at bedtime    Patient signed 72 hour notice and wants to leave on  and return work on Monday.  She does not want to rescind at this time.  Patient does not have any 302 criteria at this time.   Mild intermittent asthma  As per medical recommendations  Alcoholism in remission ([facility])  Recommend ongoing support and counseling for alcohol cessation upon discharge  Medical clearance for psychiatric admission  Done    Progress Toward Goals: At this time the patient has been making steady progress on the inpatient psychiatric unit    Recommended Treatment: Continue with group therapy, milieu therapy and occupational therapy.      Risks, benefits and possible side effects of Medications:   Risks, benefits, and possible side effects of medications explained to patient and patient verbalizes understanding.      History of Present Illness   Behavior over the last 24 hours:  unchanged  Sleep: normal  Appetite: normal  Medication side effects: No  ROS: no complaints    Subjective:    Patient was seen today for ongoing inpatient psychiatric treatment.  Per nursing report, on the inpatient psychiatric unit Janene continues to make steady progress. She was seen for initial history and physical 10/18/2024.  As documented by nursing, the patient has signed a 72-hour notice which is set to  on Jeb 10/20/24.  She does not wish to rescind at this time. Per nursing report on  "the inpatient unit she has remained in appropriate behavior control.  On evening nursing report she was observed in the activity room socializing with patients.  On evening nursing assessment she endorsed passive suicidal ideation without plan or intent to harm herself or others.  On evening nursing assessment she denied homicidal ideation.  Per nursing reports she has remained in good behavioral control on the inpatient unit and has been medication and meal compliant.    Today, Janene reports feeling \"alright.\"  She reports feeling \"a little drowsy\" today, but otherwise voices no issues or concerns.  At the time of interview, patient reports that she is looking forward to leaving the hospital. She states that she feels safe to go leave the hospital and states she will be going to live with her brother following discharge.  She states that she wants to live for her  children (ages 15, 13, 11, 7, and 1).     At the time of interview, Janene reports no particular plans for the day.  She reports that she has been spending her morning sleeping in due to feeling increasingly tired.  Although she feels that the medications have been causing her to experience some fatigue, she states that she feels that the medications are helping address her mood and anxiety and does not wish to make any medicine changes at this time.    Janene reports she has been sleeping and eating well.  She denies medication side effects outside of fatigue.    At the time of interview Janene denies suicidal ideation, homicidal ideation, visual and auditory hallucinations.    Objective   Mental Status Evaluation:  Appearance:  Age-appropriate, casually dressed, appears stated age, fair eye contact, appropriate grooming and hygiene   Behavior:  Calm, pleasant, cooperative   Speech:  Normal rate, normal volume, fluent, clear, coherent   Mood:  \"Alright, a little drowsy\"   Affect:  Mildly constricted, otherwise euthymic   Thought Process:  Organized, " linear, goal directed   Associations: intact associations   Thought Content:  normal   Perceptual Disturbances: None, does not appear responding to internal stimuli   Risk Potential: Suicidal Ideations-denies  Homicidal Ideations-denies  Potential for Aggression-no   Sensorium:  person, place, and time/date   Memory:  recent and remote memory grossly intact   Consciousness:  alert and awake    Attention: attention span and concentration were age appropriate   Insight:  fair   Judgment: fair   Gait/Station: In bed   Motor Activity: no abnormal movements     Medications: all current active meds have been reviewed and continue current psychiatric medications.      Lab Results: I have reviewed the following results:  Most Recent Labs:   Lab Results   Component Value Date    WBC 6.25 10/18/2024    RBC 5.37 (H) 10/18/2024    HGB 14.8 10/18/2024    HCT 45.5 10/18/2024     (H) 10/18/2024    RDW 12.9 10/18/2024    NEUTROABS 3.21 10/18/2024    SODIUM 138 10/18/2024    K 4.1 10/18/2024     10/18/2024    CO2 24 10/18/2024    BUN 10 10/18/2024    CREATININE 0.59 (L) 10/18/2024    GLUC 88 10/18/2024    GLUF 88 10/18/2024    CALCIUM 8.6 10/18/2024    AST 12 (L) 10/18/2024    ALT 15 10/18/2024    ALKPHOS 58 10/18/2024    TP 6.6 10/18/2024    ALB 3.7 10/18/2024    TBILI 0.57 10/18/2024    CHOLESTEROL 170 10/18/2024    HDL 40 (L) 10/18/2024    TRIG 88 10/18/2024    LDLCALC 112 (H) 10/18/2024    NONHDLC 130 10/18/2024    ZJL5AONLHECQ 1.308 10/18/2024    HCGQUANT <3 01/12/2019    HGBA1C 5.3 10/18/2024     10/18/2024       Administrative Statements   I have spent a total time of 20 minutes in caring for this patient on the day of the visit/encounter including Counseling / Coordination of care, Documenting in the medical record, Reviewing / ordering tests, medicine, procedures  , Obtaining or reviewing history  , and Communicating with other healthcare professionals . Topics discussed with the patient / family include  symptom assessment and management and medication review.

## 2024-10-19 NOTE — NURSING NOTE
"Patient was seen in the activity room with other patients at initial of shift. Patient cooperated with walking to a more quiet area to answer psychiatric assessment questions. Good eye-contact noted. Patient denied having auditory or visual hallucinations. Denied suicidal or homicidal ideation and denied endorsing passive death wishes. Patient reported feeling safe inside the hospital setting. Denied pain. Mood not numerically rated by patient, however described as \"neutral\". Last bowel movement: \"Yesterday\" per patient. No irritability or agitation noted on assessment.  "

## 2024-10-19 NOTE — NURSING NOTE
"Pt was approached early in the morning for assessment and asked to be approached later. Pt then was walking around and again approached by staff and was happy to answer questions. Pt was asked how she was sleeping and she said \"I slept well, those meds are kicking my ass. In a good way.\" Pt was asked if she wanted to speak to the doctor and she said no she was happy with her meds. Pt was asked about her appetite and she answered \"it's good\". Then pt was asked if she was having any thoughts or harming herself or others and she replied no and then if she had been hearing any voices or seeing anything to which she replied no. Then patient was asked if she had any questions or concerns for staff and she replied with a request for water and channel change. Pt is pleasant and cooperative.   "

## 2024-10-19 NOTE — ASSESSMENT & PLAN NOTE
At this time all active medications have been reviewed 10/19/24.    No medication changes at this time. Continue current psychiatric medications as prescribed. Please see below for detailed medication list.    Continue Depakote  mg daily, valproic acid level for Jeb 10/20/24  Continue Remeron 15 mg at bedtime    Patient signed 72 hour notice and wants to leave on Sunday and return work on Monday.  She does not want to rescind at this time.  Patient does not have any 302 criteria at this time.

## 2024-10-19 NOTE — NURSING NOTE
Pt had reported feeling drowsy, difficulty staying awake this morning and was requesting a decrease in dose of Remeron. Writer spoke with Psychiatrist, then met with pt again. Writer explained that lower doses of Remeron could be more sedating, increased hunger. Pt encouraged to continue on 15mg dose and give a few days to adjust to new medicines. Pt acknowledged.

## 2024-10-19 NOTE — NURSING NOTE
Pt is awake, alert, visible and social with peers. Pt reports feeling ready for discharge. Has been waiting for a phone to call brother to provide address for  tomorrow. Pt aware to let brother know [phone]. Denies SI, HI, AVH. Feels good with medications other than feeling tired, but states they will give it a few days. Pt aware of AM blood work to check VPA level and the possibility of a dose adjustment.

## 2024-10-20 VITALS
DIASTOLIC BLOOD PRESSURE: 92 MMHG | RESPIRATION RATE: 18 BRPM | BODY MASS INDEX: 38.15 KG/M2 | WEIGHT: 229 LBS | OXYGEN SATURATION: 98 % | TEMPERATURE: 97.1 F | SYSTOLIC BLOOD PRESSURE: 128 MMHG | HEIGHT: 65 IN | HEART RATE: 84 BPM

## 2024-10-20 LAB — VALPROATE SERPL-MCNC: 38 UG/ML (ref 50–100)

## 2024-10-20 PROCEDURE — 99239 HOSP IP/OBS DSCHRG MGMT >30: CPT

## 2024-10-20 PROCEDURE — 80164 ASSAY DIPROPYLACETIC ACD TOT: CPT | Performed by: STUDENT IN AN ORGANIZED HEALTH CARE EDUCATION/TRAINING PROGRAM

## 2024-10-20 RX ORDER — DIVALPROEX SODIUM 500 MG/1
500 TABLET, FILM COATED, EXTENDED RELEASE ORAL DAILY
Qty: 60 TABLET | Refills: 0 | Status: SHIPPED | OUTPATIENT
Start: 2024-10-21

## 2024-10-20 RX ORDER — MIRTAZAPINE 15 MG/1
15 TABLET, FILM COATED ORAL
Qty: 60 TABLET | Refills: 0 | Status: SHIPPED | OUTPATIENT
Start: 2024-10-20

## 2024-10-20 RX ADMIN — DIVALPROEX SODIUM 500 MG: 500 TABLET, EXTENDED RELEASE ORAL at 08:07

## 2024-10-20 NOTE — PLAN OF CARE
Problem: SELF HARM/SUICIDALITY  Goal: Will have no self-injury during hospital stay  Description: INTERVENTIONS:  - Q 15 MINUTES: Routine safety checks  - Q WAKING SHIFT & PRN: Assess risk to determine if routine checks are adequate to maintain patient safety  - Encourage patient to participate actively in care by formulating a plan to combat response to suicidal ideation, identify supports and resources  Outcome: Progressing     Problem: DEPRESSION  Goal: Will be euthymic at discharge  Description: INTERVENTIONS:  - Administer medication as ordered  - Provide emotional support via 1:1 interaction with staff  - Encourage involvement in milieu/groups/activities  - Monitor for social isolation  Outcome: Progressing     Problem: ANXIETY  Goal: Will report anxiety at manageable levels  Description: INTERVENTIONS:  - Administer medication as ordered  - Teach and encourage coping skills  - Provide emotional support  - Assess patient/family for anxiety and ability to cope  Outcome: Progressing  Goal: By discharge: Patient will verbalize 2 strategies to deal with anxiety  Description: Interventions:  - Identify any obvious source/trigger to anxiety  - Staff will assist patient in applying identified coping technique/skills  - Encourage attendance of scheduled groups and activities  Outcome: Progressing     Problem: SLEEP DISTURBANCE  Goal: Will exhibit normal sleeping pattern  Description: Interventions:  -  Assess the patients sleep pattern, noting recent changes  - Administer medication as ordered  - Decrease environmental stimuli, including noise, as appropriate during the night  - Encourage the patient to actively participate in unit groups and or exercise during the day to enhance ability to achieve adequate sleep at night  - Assess the patient, in the morning, encouraging a description of sleep experience  Outcome: Progressing     Problem: SELF CARE DEFICIT  Goal: Return ADL status to a safe level of  function  Description: INTERVENTIONS:  - Administer medication as ordered  - Assess ADL deficits and provide assistive devices as needed  - Obtain PT/OT consults as needed  - Assist and instruct patient to increase activity and self care as tolerated  Outcome: Progressing     Problem: DISCHARGE PLANNING  Goal: Discharge to home or other facility with appropriate resources  Description: INTERVENTIONS:  - Identify barriers to discharge w/patient and caregiver  - Arrange for needed discharge resources and transportation as appropriate  - Identify discharge learning needs (meds, wound care, etc.)  - Arrange for interpretive services to assist at discharge as needed  - Refer to Case Management Department for coordinating discharge planning if the patient needs post-hospital services based on physician/advanced practitioner order or complex needs related to functional status, cognitive ability, or social support system  Outcome: Progressing     Problem: Ineffective Coping  Goal: Participates in unit activities  Description: Interventions:  - Provide therapeutic environment   - Provide required programming   - Redirect inappropriate behaviors   Outcome: Progressing

## 2024-10-20 NOTE — ASSESSMENT & PLAN NOTE
At this time all active medications have been reviewed 10/20/24    No medication changes at this time. Continue current psychiatric medications as prescribed. Please see below for detailed medication list.    Continue Depakote  mg daily, valproic acid level for Jeb 10/20/24  Continue Remeron 15 mg at bedtime

## 2024-10-20 NOTE — TREATMENT TEAM
10/20/24 0700   Team Meeting   Meeting Type Daily Rounds   Team Members Present   Team Members Present Physician;Nurse   Physician Team Member Anastacia/Ata   Nursing Team Member Aaliyah   Patient/Family Present   Patient Present No   Patient's Family Present No       AM rounds- denies SI, feels that mood has improved and hopeful to discharge today. Slept well.

## 2024-10-20 NOTE — PLAN OF CARE
Problem: SELF HARM/SUICIDALITY  Goal: Will have no self-injury during hospital stay  Description: INTERVENTIONS:  - Q 15 MINUTES: Routine safety checks  - Q WAKING SHIFT & PRN: Assess risk to determine if routine checks are adequate to maintain patient safety  - Encourage patient to participate actively in care by formulating a plan to combat response to suicidal ideation, identify supports and resources  Outcome: Adequate for Discharge     Problem: DEPRESSION  Goal: Will be euthymic at discharge  Description: INTERVENTIONS:  - Administer medication as ordered  - Provide emotional support via 1:1 interaction with staff  - Encourage involvement in milieu/groups/activities  - Monitor for social isolation  Outcome: Adequate for Discharge     Problem: ANXIETY  Goal: Will report anxiety at manageable levels  Description: INTERVENTIONS:  - Administer medication as ordered  - Teach and encourage coping skills  - Provide emotional support  - Assess patient/family for anxiety and ability to cope  Outcome: Adequate for Discharge  Goal: By discharge: Patient will verbalize 2 strategies to deal with anxiety  Description: Interventions:  - Identify any obvious source/trigger to anxiety  - Staff will assist patient in applying identified coping technique/skills  - Encourage attendance of scheduled groups and activities  Outcome: Adequate for Discharge     Problem: SLEEP DISTURBANCE  Goal: Will exhibit normal sleeping pattern  Description: Interventions:  -  Assess the patients sleep pattern, noting recent changes  - Administer medication as ordered  - Decrease environmental stimuli, including noise, as appropriate during the night  - Encourage the patient to actively participate in unit groups and or exercise during the day to enhance ability to achieve adequate sleep at night  - Assess the patient, in the morning, encouraging a description of sleep experience  Outcome: Adequate for Discharge     Problem: SELF CARE  Neurosurgery Progress Note    Patient seen and examined on 08/21/22. Complains of headache today. No acute events overnight.  Neurologically stable on exam.     A/P:  79 yo man with frequent falls follwing ICH/IVH on 8/13/2022.     -Neuro stable  -HOB elevated  -Frequent neuro checks  -Hold Eliquis until 8/27/2022  -PT/OT  -Will follow    Darling Ring MD, PhD  [address], [address] [place] [address], [id number] [phone] (c), [phone] (o) DEFICIT  Goal: Return ADL status to a safe level of function  Description: INTERVENTIONS:  - Administer medication as ordered  - Assess ADL deficits and provide assistive devices as needed  - Obtain PT/OT consults as needed  - Assist and instruct patient to increase activity and self care as tolerated  Outcome: Adequate for Discharge     Problem: DISCHARGE PLANNING  Goal: Discharge to home or other facility with appropriate resources  Description: INTERVENTIONS:  - Identify barriers to discharge w/patient and caregiver  - Arrange for needed discharge resources and transportation as appropriate  - Identify discharge learning needs (meds, wound care, etc.)  - Arrange for interpretive services to assist at discharge as needed  - Refer to Case Management Department for coordinating discharge planning if the patient needs post-hospital services based on physician/advanced practitioner order or complex needs related to functional status, cognitive ability, or social support system  Outcome: Adequate for Discharge     Problem: Ineffective Coping  Goal: Participates in unit activities  Description: Interventions:  - Provide therapeutic environment   - Provide required programming   - Redirect inappropriate behaviors   Outcome: Adequate for Discharge

## 2024-10-20 NOTE — BH TRANSITION RECORD
Contact Information: If you have any questions, concerns, pended studies, tests and/or procedures, or emergencies regarding your inpatient behavioral health visit. Please contact [place] [facility] [phone] and ask to speak to a , nurse or physician. A contact is available 24 hours/ 7 days a week at this number.     Summary of Procedures Performed During your Stay:  Below is a list of major procedures performed during your hospital stay and a summary of results:  - Cardiac Procedures/Studies:     EKG 10/18/24:   Normal sinus rhythm  Nonspecific T wave abnormality     Component      Latest Ref Rng 10/18/2024   Ventricular Rate      BPM 61    Atrial Rate      BPM 61    MD Interval      ms 158    QRSD Interval      ms 80    QT Interval      ms 422    QTC Interval      ms 424    P Axis      degrees 62    QRS Axis      degrees 55    T Wave Axis      degrees 31          Pending Studies (From admission, onward)      None          Please follow up on the above pending studies with your PCP and/or referring provider.    Won Galaviz MD

## 2024-10-20 NOTE — NURSING NOTE
AVS reviewed with pt. Belongings packed with staff, all personal belongings accounted for. Paper prescriptions given to pt. Pt expresses readiness for discharge. Transport provided by pt's brother.

## 2024-10-20 NOTE — NURSING NOTE
Pt is awake, alert, pleasant during interaction, appears less drowsy this morning, than yesterday AM. Pt denies SI, HI, AVH. Feels ready for discharge this morning. Writer explained typical day of discharge process. Home meds retrieved and placed with discharge folder. Pt's brother to provider transportation home at approx 1000.

## 2024-10-20 NOTE — DISCHARGE SUMMARY
"Discharge Summary - Behavioral Health   Name: Janene Veloz 34 y.o. female I MRN: [id number]  Unit/Bed#: -01 I Date of Admission: 10/17/2024   Date of Service: 10/20/2024 I Hospital Day: 3      Admission Date: 10/17/2024  Discharge Date: 10/20/24    Attending Psychiatrist: Won Galaviz MD    History of Present Illness     The following italicized information is copied from Dr. Renteria's H&P 10/18/24:  Per Crisis worker on 10/17:\"Patient is a 34 yr old female with a hx of mental health and substance use disorder. She arrived in the ED with depression and suicidal thoughts, and a plan to jump in front of a car or bus. She reports that she has been homeless for the last 4 months, occasionally staying in her girlfriend's car, but has been unable to find any stable, affordable housing. She is employed at a warehouse. She states that she has no family to stay with. She reports that she is suicidal with a plan to jump in fromt of a bus or car. Mood is depressed, anxious, feeling helpless and hopeless. She has been unable to find help. She is currently on house arrest for a DUI. (second offense). She no longer drinks alcohol, and in the past used cocaine but not for years. No over psychosis. she is calm and cooperative, with poor eye contact. Patient signed a 201 for admission.   Patient currently has no providers.  She states that she had a suicide attempt at age 16, an overdose attempt, and was hospitalized at [facility].  She was on depakote and welbutrin but has not taken any meds for years.   Roxanna Wu LSW\"     This is 34-year-old female with history of bipolar disorder/depression/anxiety and alcohol use admitted to inpatient unit on 201 for worsening of mood and suicidal ideations with plan to jump in front of a car in the context of psychosocial stressors.  Patient reports long history of bipolar disorder since the age of 16 and was on Depakote and Wellbutrin.  However " stopped few months ago as she ran out of medications and does not have any providers.  Patient endorses depressed mood, anhedonia, mood swings, racing thoughts, impulsive behaviors, poor sleep, fluctuations in energy and motivation.  Denies any symptoms of psychosis.  Denies any thoughts to hurt herself or others.     Hospital Course:     Janene was admitted to the [facility] inpatient behavioral health unit on 10/17/2024.  On arrival to the inpatient behavioral health unit she was started on behavioral health checks every 15 minutes.  Per nursing report she signed a 72-hour notice on 10/17/2024.  Janene was seen for initial history and physical 10/18/2024.    On the inpatient psychiatric unit Janene was treated with:  -Mood stabilizing medication Depakote  mg daily (with valproic acid level of 38 on 10/20/2024)   PARQ was completed for Depakote/valproate including GI distress, tremor, weight gain, risk of hepatotoxicity, blood dyscrasias/bone marrow effects, SIADH, pancreatitis, worsened depression/suicidality, and need for blood testing and monitoring. In addition, discussed andrenergic effects, PCOS, and teratogenicity if of childbearing age as well.      -Antidepressant Remeron 15 mg at bedtime.   PARQ was completed for mirtazapine (Remeron) including sedation, weight gain, and rare hyponatremia or agranulocytosis.    On the inpatient psychiatric unit Janene made steady progress. Following her initial history and physical 10/18/2024 she was noted to remain in appropriate behavior control. As her treatment progressed her mood brightened and she was noted to be visible in the milieu, socializing with peers, attending groups and participating appropriately.  On the evening of her initial assessment 10/18/2024 she did endorse some passive suicidal ideation without plan or intent to harm herself.  Since 10/19/2024 she has been consistently denying both passive suicidal ideation as well as active  "suicidal ideation.  She has consistently denied homicidal ideation. She has consistently denied visual and auditory hallucinations. On the inpatient psychiatric unit she has remained in good behavior control and she has remained medication and meal compliant.    As mentioned above, Janene signed a 72-hour notice 10/17/2024.  On the date of her 72-hour expiration 10/20/2024 there were no grounds for involuntary commitment.     At the time of discharge, Janene reported feeling \"positive.\" Janene reports that her mood and outlook on life has improved significantly during this hospitalization.  At the time of interview she denies symptoms of depression and anxiety.  She is looking forward to living with her brother in [place] upon discharge.  She states that she is looking forward to talking with family members and she states that she is looking forward to watching her nephews playing football today. She states that she wants to live for her children (ages 15, 13,11,7, and 1).    Janene stated that as her hospital stay progressed she adjusted better to her medications and states \"I'm not as drowsy.\"  Outside of fatigue, she denies any other medication side effects. Janene reports on the inpatient psychiatric unit she has been sleeping well and eating well. Janene verbalizes that she will continue to take her current psychiatric medications (Depakote  mg daily and Remeron 15 mg at bedtime) upon discharge.  She understands that an appointment has been made for her to establish psychiatric care with Dr. De in December (12/2/2024).    Presently, patient denies suicidal/homicidal ideation in addition to thoughts of self-injury.  At conclusion of evaluation, patient is amenable to the recommendations of this writer including: continue psychotropic medications as prescribed. Patient is amenable to calling/contacting crisis and/or attending to the nearest emergency department if their clinical " "condition deteriorates to assure their safety and stability, stating that they are able to appropriately confide in their sister regarding their psychiatric state.    Mental Status at time of Discharge:   Appearance:  Age-appropriate, casually dressed, appears stated age, good eye contact, appropriate grooming and hygiene   Behavior:  Calm, pleasant, cooperative   Speech:  Normal rate, normal volume, fluent, clear, coherent   Mood:  \"Positive\"   Affect:  Euthymic   Thought Process:  Organized, linear, goal directed   Associations: Intact associations   Thought Content:  Normal   Perceptual Disturbances: Denies visual and auditory hallucinations, does not appear responding to internal stimuli   Risk Potential: Denies suicidal ideation, homicidal ideation, plan or intent to harm herself or others   Sensorium:  person, place, and time/date   Cognition:  recent and remote memory grossly intact   Consciousness:  alert and awake    Attention: attention span and concentration were age appropriate   Insight:  fair   Judgment: fair   Gait/Station: normal gait/station and normal balance   Motor Activity: no abnormal movements     Discharge Diagnosis:   Assessment & Plan  Bipolar 1 disorder (HCC)  At this time all active medications have been reviewed 10/20/24    No medication changes at this time. Continue current psychiatric medications as prescribed. Please see below for detailed medication list.    Continue Depakote  mg daily, valproic acid level for Jeb 10/20/24  Continue Remeron 15 mg at bedtime  Mild intermittent asthma  Aware of the need to follow up with family physician for medical issues upon discharge  Alcoholism in remission ([facility])  Recommend ongoing support and counseling for alcohol cessation upon discharge  Medical clearance for psychiatric admission  Done    Medical Problems       Resolved Problems  Date Reviewed: 10/17/2024   None           Discharge Medications:  See after visit summary for " reconciled discharge medications provided to patient and family.      Discharge instructions/Information to patient and family:   See after visit summary for information provided to patient and family.      Provisions for Follow-Up Care:  See after visit summary for information related to follow-up care and any pertinent home health orders.      Discharge Statement:  I have spent a total time of 45 minutes in caring for this patient on the day of the visit/encounter. >30 minutes of time was spent on: Instructions for management, Patient and family education, Importance of tx compliance, Risk factor reductions, Counseling / Coordination of care, Documenting in the medical record, and Reviewing / ordering tests, medicine, procedures      Won Galaviz MD.

## 2024-10-22 NOTE — CASE MANAGEMENT
10/22/24 - 10am  Pt called requesting a return to work note. CM provided pt with hospital admission/dc note for her dc on Jeb 10/20/24 and at that time she did not request a return date. Pt reported she sent the other note to employer but they need a return date. Pt requested return date for Wednesday 10/23/24.     NACHO emailed to: [email or web address]

## 2024-11-21 NOTE — ED PROVIDER NOTES
"Time reflects when diagnosis was documented in both MDM as applicable and the Disposition within this note       Time User Action Codes Description Comment    10/17/2024  7:40 AM Ryan Lassiter [R45.851] Suicidal ideation     10/17/2024  7:41 AM Ryan Lassiter [Z00.8] Encounter for psychological evaluation     10/17/2024  7:41 AM Ryan Lassiter [Z59.00] Homelessness     10/17/2024 12:15 PM Leana Disla Add [F10.21] Alcoholism in remission (HCC)           ED Disposition       ED Disposition   Transfer to Behavioral Health Condition   --    Date/Time   Thu Oct 17, 2024  7:02 AM    Comment   Janene Veloz should be transferred out to  and has been medically cleared.               Assessment & Plan       Medical Decision Making  Amount and/or Complexity of Data Reviewed  Labs: ordered.    Risk  Decision regarding hospitalization.             Medications - No data to display    ED Risk Strat Scores                           SBIRT 20yo+      Flowsheet Row Most Recent Value   Initial Alcohol Screen: US AUDIT-C     1. How often do you have a drink containing alcohol? 0 Filed at: 10/17/2024 0630   2. How many drinks containing alcohol do you have on a typical day you are drinking?  0 Filed at: 10/17/2024 0630   3a. Male UNDER 65: How often do you have five or more drinks on one occasion? 0 Filed at: 10/17/2024 0630   3b. FEMALE Any Age, or MALE 65+: How often do you have 4 or more drinks on one occassion? 0 Filed at: 10/17/2024 0630   Audit-C Score 0 Filed at: 10/17/2024 0630   MERY: How many times in the past year have you...    Used an illegal drug or used a prescription medication for non-medical reasons? Never Filed at: 10/17/2024 0630                            History of Present Illness       Chief Complaint   Patient presents with    Psychiatric Evaluation     Patient admits to suicidal ideation with plan to jump in front of a car. Reports \"cannot take it anymore and just wants to " "die.\" Has been homeless for the past 4 months, has not showered in 3 days and has so much stress in life that he would just like it to end. Reports no AH/VH but states that thoughts are vivid. Wants  called and has been unable to concentrate - has searched for shelters and has been trying to get help with no success. Cannot cope any longer with the stress.       Past Medical History:   Diagnosis Date    Anemia due to chronic blood loss     has had recent iron infusions    Anxiety     Asthma     Bipolar disorder (HCC)     Depression     Dry cough     pt states allergy related    Environmental and seasonal allergies     Fibroid     Fibroid uterus 2019    Food allergy     raisins    Varicella     Wears contact lenses       Past Surgical History:   Procedure Laterality Date    NO PAST SURGERIES      CT SALPINGECTOMY COMPLETE/PARTIAL UNI/BI SPX Bilateral 2019    Procedure: SALPINGECTOMY;  Surgeon: Emilee Hernandez MD;  Location: [facility];  Service: Gynecology    CT TOTAL ABDOMINAL HYSTERECT W/WO RMVL TUBE OVARY N/A 2019    Procedure: HYSTERECTOMY TOTAL ABDOMINAL (ANTONIO);  Surgeon: Emilee Hernandez MD;  Location: [facility];  Service: Gynecology      Family History   Problem Relation Age of Onset    Colon cancer Father          age 44    Prostate cancer Father     Cirrhosis Father     Alcohol abuse Father     Sleep apnea Father     Cancer Maternal Grandmother     Other Mother         hysterectomy    No Known Problems Sister     No Known Problems Brother     Other Paternal Grandmother          during surgery    No Known Problems Brother     Breast cancer Neg Hx     Ovarian cancer Neg Hx       Social History     Tobacco Use    Smoking status: Never     Passive exposure: Never    Smokeless tobacco: Never   Vaping Use    Vaping status: Never Used   Substance Use Topics    Alcohol use: Not Currently     Alcohol/week: 4.0 - 5.0 standard drinks of alcohol     Types: 4 - 5 Cans of beer per " week     Comment: rarely    Drug use: Yes     Types: Marijuana     Comment: daily      E-Cigarette/Vaping    E-Cigarette Use Never User       E-Cigarette/Vaping Substances    Nicotine No     THC No     CBD No     Flavoring No     Other No     Unknown No       I have reviewed and agree with the history as documented.       Psychiatric Evaluation      Review of Systems        Objective       ED Triage Vitals   Temperature Pulse Blood Pressure Respirations SpO2 Patient Position - Orthostatic VS   10/17/24 0625 10/17/24 0625 10/17/24 0625 10/17/24 0625 10/17/24 0625 10/17/24 0625   98.6 °F (37 °C) 80 142/79 18 98 % Sitting      Temp Source Heart Rate Source BP Location FiO2 (%) Pain Score    10/17/24 0625 10/17/24 0625 10/17/24 0625 -- 10/17/24 1336    Oral Monitor Right arm  No Pain      Vitals      Date and Time Temp Pulse SpO2 Resp BP Pain Score FACES Pain Rating User   10/17/24 1336 -- 84 100 % 18 127/82 No Pain -- TW   10/17/24 0625 98.6 °F (37 °C) 80 98 % 18 142/79 -- -- EY            Physical Exam    Results Reviewed       Procedure Component Value Units Date/Time    POCT pregnancy, urine [[id number]]  (Normal) Resulted: 10/17/24 1202    Lab Status: Final result Updated: 10/17/24 1202     EXT Preg Test, Ur Negative     Control Valid    Rapid drug screen, urine [[id number]]  (Abnormal) Collected: 10/17/24 0643    Lab Status: Final result Specimen: Urine, Other Updated: 10/17/24 0915     Amph/Meth UR Negative     Barbiturate Ur Negative     Benzodiazepine Urine Negative     Cocaine Urine Negative     Methadone Urine Negative     Opiate Urine Negative     PCP Ur Negative     THC Urine Positive     Oxycodone Urine Negative     Fentanyl Urine Negative     HYDROCODONE URINE Negative    Narrative:      Presumptive report. If requested, specimen will be sent to reference lab for confirmation.  FOR MEDICAL PURPOSES ONLY.   IF CONFIRMATION NEEDED PLEASE CONTACT THE LAB WITHIN 5 DAYS.    Drug Screen Cutoff  Levels:  AMPHETAMINE/METHAMPHETAMINES  1000 ng/mL  BARBITURATES     200 ng/mL  BENZODIAZEPINES     200 ng/mL  COCAINE      300 ng/mL  METHADONE      300 ng/mL  OPIATES      300 ng/mL  PHENCYCLIDINE     25 ng/mL  THC       50 ng/mL  OXYCODONE      100 ng/mL  FENTANYL      5 ng/mL  HYDROCODONE     300 ng/mL    POCT alcohol breath test [[id number]]  (Normal) Resulted: 10/17/24 0637    Lab Status: Final result Updated: 10/17/24 0637     EXTBreath Alcohol 0.00            No orders to display       Procedures    ED Medication and Procedure Management   Prior to Admission Medications   Prescriptions Last Dose Informant Patient Reported? Taking?   albuterol (Proventil HFA) 90 mcg/act inhaler   No No   Sig: Inhale 2 puffs every 4 (four) hours as needed for wheezing      Facility-Administered Medications: None     Discharge Medication List as of 10/17/2024  2:08 PM        CONTINUE these medications which have NOT CHANGED    Details   aluminum-magnesium hydroxide-simethicone (MAALOX MAX) 400-400-40 MG/5ML suspension Take 10 mL by mouth every 6 (six) hours as needed for indigestion or heartburn, Starting Tue 2/20/2024, Normal      albuterol (Proventil HFA) 90 mcg/act inhaler Inhale 2 puffs every 4 (four) hours as needed for wheezing, Starting Tue 5/7/2024, Normal      bismuth subsalicylate (PEPTO BISMOL) 262 MG chewable tablet Chew 1 tablet (262 mg total) 4 (four) times a day (before meals and at bedtime), Starting Tue 3/5/2024, Normal      cyclobenzaprine (FLEXERIL) 10 mg tablet Take 1 tablet (10 mg total) by mouth 2 (two) times a day as needed for muscle spasms, Starting Wed 5/29/2024, Normal      dicyclomine (BENTYL) 10 mg capsule Take 1 capsule (10 mg total) by mouth 4 (four) times a day (before meals and at bedtime) for 7 days, Starting Wed 1/31/2024, Until Wed 2/7/2024, Normal      divalproex sodium (Depakote) 500 mg DR tablet Take 1 tablet (500 mg total) by mouth every 8 (eight) hours, Starting Wed 1/31/2024, Normal       fluticasone (FLONASE) 50 mcg/act nasal spray 1 spray into each nostril daily, Starting Tue 5/7/2024, Normal      lidocaine (Lidoderm) 5 % Apply 1 patch topically over 12 hours daily Remove & Discard patch within 12 hours or as directed by MD, Starting Wed 5/29/2024, Normal      !! naproxen (Naprosyn) 500 mg tablet Take 1 tablet (500 mg total) by mouth 2 (two) times a day with meals, Starting Tue 1/23/2024, Normal      !! naproxen (Naprosyn) 500 mg tablet Take 1 tablet (500 mg total) by mouth 2 (two) times a day with meals, Starting Wed 5/29/2024, Normal      omeprazole (PriLOSEC) 20 mg delayed release capsule Take 1 capsule (20 mg total) by mouth daily, Starting Tue 2/20/2024, Normal      ondansetron (ZOFRAN-ODT) 8 mg disintegrating tablet Take 1 tablet (8 mg total) by mouth every 8 (eight) hours as needed for nausea or vomiting, Starting Tue 2/20/2024, Normal      polyethylene glycol-electrolytes (NULYTELY) 4000 mL solution Take 4,000 mL by mouth once for 1 dose, Starting Tue 2/20/2024, Normal      salicylic acid 17 % gel Apply topically daily, Starting Wed 9/25/2024, Normal       !! - Potential duplicate medications found. Please discuss with provider.        No discharge procedures on file.  ED SEPSIS DOCUMENTATION   Time reflects when diagnosis was documented in both MDM as applicable and the Disposition within this note       Time User Action Codes Description Comment    10/17/2024  7:40 AM Ryan Lassiter [R45.851] Suicidal ideation     10/17/2024  7:41 AM Ryan Lassiter [Z00.8] Encounter for psychological evaluation     10/17/2024  7:41 AM Ryan Lassiter [Z59.00] Homelessness     10/17/2024 12:15 PM Leana Disla Add [F10.21] Alcoholism in remission (HCC)                  Moose Patton,   11/20/24 [id number]

## 2024-12-02 ENCOUNTER — OFFICE VISIT (OUTPATIENT)
Dept: PSYCHIATRY | Facility: CLINIC | Age: 34
End: 2024-12-02
Payer: COMMERCIAL

## 2024-12-02 DIAGNOSIS — F31.9 BIPOLAR 1 DISORDER (HCC): Primary | Chronic | ICD-10-CM

## 2024-12-02 DIAGNOSIS — Z79.899 MEDICAL MARIJUANA USE: ICD-10-CM

## 2024-12-02 DIAGNOSIS — F41.0 GENERALIZED ANXIETY DISORDER WITH PANIC ATTACKS: ICD-10-CM

## 2024-12-02 DIAGNOSIS — E55.9 VITAMIN D DEFICIENCY: ICD-10-CM

## 2024-12-02 DIAGNOSIS — F10.21 ALCOHOL USE DISORDER, SEVERE, IN SUSTAINED REMISSION (HCC): ICD-10-CM

## 2024-12-02 DIAGNOSIS — F41.1 GENERALIZED ANXIETY DISORDER WITH PANIC ATTACKS: ICD-10-CM

## 2024-12-02 PROCEDURE — 90792 PSYCH DIAG EVAL W/MED SRVCS: CPT | Performed by: PSYCHIATRY & NEUROLOGY

## 2024-12-02 RX ORDER — DIVALPROEX SODIUM 250 MG/1
750 TABLET, FILM COATED, EXTENDED RELEASE ORAL DAILY
Qty: 90 TABLET | Refills: 2 | Status: SHIPPED | OUTPATIENT
Start: 2024-12-02

## 2024-12-02 RX ORDER — ERGOCALCIFEROL 1.25 MG/1
50000 CAPSULE, LIQUID FILLED ORAL WEEKLY
Qty: 8 CAPSULE | Refills: 0 | Status: SHIPPED | OUTPATIENT
Start: 2024-12-02

## 2024-12-02 RX ORDER — HYDROXYZINE PAMOATE 25 MG/1
25 CAPSULE ORAL 3 TIMES DAILY PRN
Qty: 90 CAPSULE | Refills: 2 | Status: SHIPPED | OUTPATIENT
Start: 2024-12-02
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
## 2024-12-06 NOTE — ASSESSMENT & PLAN NOTE
Unstable in the c/o psychosocial stressors. PHQ-9 of 11.   Increase Depakote to 750 mg daily for mood stabilization.  Currently on waitlist for therapy, supportive therapy during med management visits.

## 2024-12-09 NOTE — BH TREATMENT PLAN
TREATMENT PLAN (Medication Management Only)        [facility] - [facility]    Name and Date of Birth:  Janene Veloz 34 y.o. 1990  Date of Treatment Plan: December 9, 2024  Diagnosis/Diagnoses:    1. Bipolar 1 disorder (HCC)    2. Generalized anxiety disorder with panic attacks    3. Vitamin D deficiency    4. Medical marijuana use    5. Alcohol use disorder, severe, in sustained remission (HCC)      Strengths/Personal Resources for Self-Care: supportive family, ability to communicate needs, family ties, motivation for treatment, willingness to work on problems.  Area/Areas of need (in own words): anxiety, depression  1. Long Term Goal: improve control of anxiety.  Target Date:6 months - 6/9/2025  Person/Persons responsible for completion of goal: Janene  2.  Short Term Objective (s) - How will we reach this goal?:   A. Provider new recommended medication/dosage changes and/or continue medication(s): continue current medications as prescribed.  B. N/A.  C. N/A.  Target Date:6 months - 6/9/2025  Person/Persons Responsible for Completion of Goal: Janene  Progress Towards Goals: starting treatment  Treatment Modality: medication management with psychotherapy every 1 month  Review due 180 days from date of this plan: 6 months - 6/9/2025  Expected length of service: ongoing treatment  My Physician/PA/NP and I have developed this plan together and I agree to work on the goals and objectives. I understand the treatment goals that were developed for my treatment.

## 2024-12-16 DIAGNOSIS — F31.9 BIPOLAR 1 DISORDER (HCC): Chronic | ICD-10-CM

## 2024-12-17 RX ORDER — MIRTAZAPINE 15 MG/1
15 TABLET, FILM COATED ORAL
Qty: 60 TABLET | Refills: 0 | Status: SHIPPED | OUTPATIENT
Start: 2024-12-17

## 2024-12-19 ENCOUNTER — TELEPHONE (OUTPATIENT)
Age: 34
End: 2024-12-19

## 2024-12-19 NOTE — TELEPHONE ENCOUNTER
Contacted patient off of Talk Therapy  wait list to verify needs of services in attempts to offer appt. spoke with patient whom stated disconnected phone call as writer was introducing herself.    Attempt #1

## 2025-01-02 ENCOUNTER — TELEPHONE (OUTPATIENT)
Dept: PSYCHIATRY | Facility: CLINIC | Age: 35
End: 2025-01-02

## 2025-01-02 NOTE — TELEPHONE ENCOUNTER
Writer called and LVM to reschedule follow up for today with Dr Choi. Provider is out of the office

## 2025-01-03 ENCOUNTER — APPOINTMENT (EMERGENCY)
Dept: RADIOLOGY | Facility: HOSPITAL | Age: 35
End: 2025-01-03
Payer: MEDICARE

## 2025-01-03 ENCOUNTER — HOSPITAL ENCOUNTER (EMERGENCY)
Facility: HOSPITAL | Age: 35
Discharge: HOME | End: 2025-01-03
Attending: EMERGENCY MEDICINE
Payer: MEDICARE

## 2025-01-03 VITALS
WEIGHT: 227.8 LBS | OXYGEN SATURATION: 99 % | TEMPERATURE: 98.4 F | BODY MASS INDEX: 37.91 KG/M2 | RESPIRATION RATE: 17 BRPM | HEART RATE: 89 BPM | SYSTOLIC BLOOD PRESSURE: 115 MMHG | DIASTOLIC BLOOD PRESSURE: 67 MMHG

## 2025-01-03 DIAGNOSIS — R42 LIGHTHEADEDNESS: Primary | ICD-10-CM

## 2025-01-03 DIAGNOSIS — J11.1 INFLUENZA: ICD-10-CM

## 2025-01-03 LAB
ALBUMIN SERPL BCG-MCNC: 3.6 G/DL (ref 3.5–5)
ALP SERPL-CCNC: 47 U/L (ref 34–104)
ALT SERPL W P-5'-P-CCNC: 14 U/L (ref 7–52)
ANION GAP SERPL CALCULATED.3IONS-SCNC: 9 MMOL/L (ref 4–13)
AST SERPL W P-5'-P-CCNC: 14 U/L (ref 13–39)
BASOPHILS # BLD AUTO: 0.03 THOUSANDS/ΜL (ref 0–0.1)
BASOPHILS NFR BLD AUTO: 1 % (ref 0–1)
BILIRUB SERPL-MCNC: 0.38 MG/DL (ref 0.2–1)
BUN SERPL-MCNC: 11 MG/DL (ref 5–25)
CALCIUM SERPL-MCNC: 8.5 MG/DL (ref 8.4–10.2)
CHLORIDE SERPL-SCNC: 103 MMOL/L (ref 96–108)
CO2 SERPL-SCNC: 25 MMOL/L (ref 21–32)
CREAT SERPL-MCNC: 1.01 MG/DL (ref 0.6–1.3)
EOSINOPHIL # BLD AUTO: 0.07 THOUSAND/ΜL (ref 0–0.61)
EOSINOPHIL NFR BLD AUTO: 1 % (ref 0–6)
ERYTHROCYTE [DISTWIDTH] IN BLOOD BY AUTOMATED COUNT: 13.3 % (ref 11.6–15.1)
EXT PREGNANCY TEST URINE: NEGATIVE
EXT. CONTROL: NORMAL
GFR SERPL CREATININE-BSD FRML MDRD: 71 ML/MIN/1.73SQ M
GLUCOSE SERPL-MCNC: 156 MG/DL (ref 65–140)
HCT VFR BLD AUTO: 53 % (ref 34.8–46.1)
HGB BLD-MCNC: 17.2 G/DL (ref 11.5–15.4)
IMM GRANULOCYTES # BLD AUTO: 0.02 THOUSAND/UL (ref 0–0.2)
IMM GRANULOCYTES NFR BLD AUTO: 0 % (ref 0–2)
LYMPHOCYTES # BLD AUTO: 2.17 THOUSANDS/ΜL (ref 0.6–4.47)
LYMPHOCYTES NFR BLD AUTO: 42 % (ref 14–44)
MCH RBC QN AUTO: 27.4 PG (ref 26.8–34.3)
MCHC RBC AUTO-ENTMCNC: 32.5 G/DL (ref 31.4–37.4)
MCV RBC AUTO: 84 FL (ref 82–98)
MONOCYTES # BLD AUTO: 0.34 THOUSAND/ΜL (ref 0.17–1.22)
MONOCYTES NFR BLD AUTO: 7 % (ref 4–12)
NEUTROPHILS # BLD AUTO: 2.51 THOUSANDS/ΜL (ref 1.85–7.62)
NEUTS SEG NFR BLD AUTO: 49 % (ref 43–75)
NRBC BLD AUTO-RTO: 0 /100 WBCS
PLATELET # BLD AUTO: 313 THOUSANDS/UL (ref 149–390)
PMV BLD AUTO: 8.7 FL (ref 8.9–12.7)
POTASSIUM SERPL-SCNC: 3.5 MMOL/L (ref 3.5–5.3)
PROT SERPL-MCNC: 6.4 G/DL (ref 6.4–8.4)
RBC # BLD AUTO: 6.28 MILLION/UL (ref 3.81–5.12)
SODIUM SERPL-SCNC: 137 MMOL/L (ref 135–147)
WBC # BLD AUTO: 5.14 THOUSAND/UL (ref 4.31–10.16)

## 2025-01-03 PROCEDURE — 99284 EMERGENCY DEPT VISIT MOD MDM: CPT | Performed by: EMERGENCY MEDICINE

## 2025-01-03 PROCEDURE — 96360 HYDRATION IV INFUSION INIT: CPT

## 2025-01-03 PROCEDURE — 85025 COMPLETE CBC W/AUTO DIFF WBC: CPT | Performed by: EMERGENCY MEDICINE

## 2025-01-03 PROCEDURE — 81025 URINE PREGNANCY TEST: CPT | Performed by: EMERGENCY MEDICINE

## 2025-01-03 PROCEDURE — 36415 COLL VENOUS BLD VENIPUNCTURE: CPT | Performed by: EMERGENCY MEDICINE

## 2025-01-03 PROCEDURE — 80053 COMPREHEN METABOLIC PANEL: CPT | Performed by: EMERGENCY MEDICINE

## 2025-01-03 PROCEDURE — 71045 X-RAY EXAM CHEST 1 VIEW: CPT

## 2025-01-03 PROCEDURE — 99284 EMERGENCY DEPT VISIT MOD MDM: CPT

## 2025-01-03 RX ORDER — ONDANSETRON 4 MG/1
4 TABLET, ORALLY DISINTEGRATING ORAL EVERY 8 HOURS PRN
COMMUNITY
Start: 2025-01-01 | End: 2025-01-11

## 2025-01-03 RX ADMIN — SODIUM CHLORIDE 1000 ML: 0.9 INJECTION, SOLUTION INTRAVENOUS at 16:41

## 2025-01-03 NOTE — Clinical Note
Janene Veloz was seen and treated in our emergency department on 1/3/2025.                Diagnosis:     Janene  .    She may return on this date: 01/06/2025         If you have any questions or concerns, please don't hesitate to call.      Jeff Bhardwaj MD    ______________________________           _______________          _______________  Hospital Representative                              Date                                Time

## 2025-01-03 NOTE — ED PROVIDER NOTES
Time reflects when diagnosis was documented in both MDM as applicable and the Disposition within this note       Time User Action Codes Description Comment    1/3/2025  5:56 PM Jeff Bhardwaj Add [R42] Lightheadedness     1/3/2025  5:57 PM Jeff Bhardwaj Add [J11.1] Influenza           ED Disposition       ED Disposition   Discharge    Condition   Stable    Date/Time   Fri Bryson 3, 2025  5:56 PM    Comment   Janene Veloz discharge to home/self care.                   Assessment & Plan       Medical Decision Making  Patient's electrolytes show no gap acidosis she is mildly dry fluids here no ectopy on the cardiac monitor pregnancy test was negative CBC showed leukocytosis or anemia patient felt to have symptoms consistent with a flulike illness her chest x-ray did not show infiltrates or pneumothorax patient did not have meningitis neurologically intact patient felt safe for discharge    Amount and/or Complexity of Data Reviewed  Labs: ordered.  Radiology: ordered and independent interpretation performed.             Medications   sodium chloride 0.9 % bolus 1,000 mL (0 mL Intravenous Stopped 1/3/25 2006)       ED Risk Strat Scores                                              History of Present Illness       Chief Complaint   Patient presents with    Dizziness     Patient dx with flu on Tuesday.  Patient began experiencing dizziness earlier today.   Patient was getting a haircut earlier today when symptoms occurred after being dx w/ flu.       Past Medical History:   Diagnosis Date    Anemia due to chronic blood loss     has had recent iron infusions    Anxiety     Asthma     Bipolar disorder (HCC)     Depression     Dry cough     pt states allergy related    Environmental and seasonal allergies     Fibroid     Fibroid uterus 1/14/2019    Food allergy     raisins    Varicella     Wears contact lenses       Past Surgical History:   Procedure Laterality Date    [place] SALPINGECTOMY COMPLETE/PARTIAL UNI/BI SPX  Bilateral 2019    Procedure: SALPINGECTOMY;  Surgeon: Emilee Hernandez MD;  Location: [facility];  Service: Gynecology    OR TOTAL ABDOMINAL HYSTERECT W/WO RMVL TUBE OVARY N/A 2019    Procedure: HYSTERECTOMY TOTAL ABDOMINAL (ANTONIO);  Surgeon: Emilee Hernandez MD;  Location: [facility];  Service: Gynecology      Family History   Problem Relation Age of Onset    Colon cancer Father          age 44    Prostate cancer Father     Cirrhosis Father     Alcohol abuse Father     Sleep apnea Father     No Known Problems Sister     No Known Problems Brother     No Known Problems Brother     Cancer Maternal Grandmother     Breast cancer Neg Hx     Ovarian cancer Neg Hx     Mental illness Neg Hx     Suicidality Neg Hx       Social History     Tobacco Use    Smoking status: Never     Passive exposure: Never    Smokeless tobacco: Never   Vaping Use    Vaping status: Never Used   Substance Use Topics    Alcohol use: Not Currently     Comment: previously problematic use from 4004-8110, previously daily >20 drinks daily    Drug use: Yes     Types: Marijuana, Cocaine     Comment: current daily marijuana use (medical marijuana license), cocaine in 20s ([id number])      E-Cigarette/Vaping    E-Cigarette Use Never User       E-Cigarette/Vaping Substances    Nicotine No     THC No     CBD No     Flavoring No     Other No     Unknown No       I have reviewed and agree with the history as documented.     Patient just diagnosed at [facility] with influenza 2 days ago as she has not been eating well she was at the to she got very lightheaded felt like she was going to pass out they fed her burger and she came in here she denies any chest pain shortness of breath palpitations take anything for the aches and pains other than the NyQuil yesterday no vomiting or diarrhea but continues to cough      Dizziness  Associated symptoms: no chest pain, no palpitations, no shortness of breath, no vomiting and no weakness         Review of Systems   Constitutional:  Negative for chills and fever.   HENT:  Negative for ear pain and sore throat.    Eyes:  Negative for redness and visual disturbance.   Respiratory:  Positive for cough. Negative for shortness of breath and wheezing.    Cardiovascular:  Negative for chest pain and palpitations.   Gastrointestinal:  Negative for abdominal pain and vomiting.   Musculoskeletal:  Positive for myalgias. Negative for arthralgias and back pain.   Skin:  Negative for color change and rash.   Neurological:  Positive for dizziness and light-headedness. Negative for seizures, syncope, weakness and numbness.   Psychiatric/Behavioral:  Negative for confusion.    All other systems reviewed and are negative.          Objective       ED Triage Vitals [01/03/25 1619]   Temperature Pulse Blood Pressure Respirations SpO2 Patient Position - Orthostatic VS   98.4 °F (36.9 °C) (!) 114 110/78 20 99 % Sitting      Temp Source Heart Rate Source BP Location FiO2 (%) Pain Score    Oral Monitor Left arm -- --      Vitals      Date and Time Temp Pulse SpO2 Resp BP Pain Score FACES Pain Rating User   01/03/25 1712 -- 89 99 % 17 115/67 -- -- LR   01/03/25 1619 98.4 °F (36.9 °C) 114 99 % 20 110/78 -- -- RG            Physical Exam  Vitals and nursing note reviewed.   Constitutional:       General: She is not in acute distress.     Appearance: She is well-developed. She is not ill-appearing, toxic-appearing or diaphoretic.   HENT:      Head: Normocephalic and atraumatic.      Mouth/Throat:      Mouth: Mucous membranes are moist.   Eyes:      Extraocular Movements: Extraocular movements intact.      Conjunctiva/sclera: Conjunctivae normal.      Pupils: Pupils are equal, round, and reactive to light.      Comments: No nystagmus   Cardiovascular:      Rate and Rhythm: Normal rate and regular rhythm.      Heart sounds: No murmur heard.  Pulmonary:      Effort: Pulmonary effort is normal. No respiratory distress.      Breath  sounds: Normal breath sounds. No stridor. No wheezing, rhonchi or rales.   Abdominal:      Palpations: Abdomen is soft.      Tenderness: There is no abdominal tenderness.   Musculoskeletal:         General: No swelling.      Cervical back: Normal range of motion and neck supple. No rigidity or tenderness.   Skin:     General: Skin is warm and dry.      Capillary Refill: Capillary refill takes less than 2 seconds.      Coloration: Skin is not jaundiced.   Neurological:      General: No focal deficit present.      Mental Status: She is alert.      Cranial Nerves: No cranial nerve deficit.      Sensory: No sensory deficit.      Motor: No weakness.      Coordination: Coordination normal.      Gait: Gait normal.      Comments: oriented   Psychiatric:         Mood and Affect: Mood normal.         Results Reviewed       Procedure Component Value Units Date/Time    POCT pregnancy, urine [[id number]]  (Normal) Collected: 01/03/25 1746    Lab Status: Final result Updated: 01/03/25 1750     EXT Preg Test, Ur Negative     Control Valid    Comprehensive metabolic panel [[id number]]  (Abnormal) Collected: 01/03/25 1635    Lab Status: Final result Specimen: Blood from Arm, Left Updated: 01/03/25 1703     Sodium 137 mmol/L      Potassium 3.5 mmol/L      Chloride 103 mmol/L      CO2 25 mmol/L      ANION GAP 9 mmol/L      BUN 11 mg/dL      Creatinine 1.01 mg/dL      Glucose 156 mg/dL      Calcium 8.5 mg/dL      AST 14 U/L      ALT 14 U/L      Alkaline Phosphatase 47 U/L      Total Protein 6.4 g/dL      Albumin 3.6 g/dL      Total Bilirubin 0.38 mg/dL      eGFR 71 ml/min/1.73sq m     Narrative:      National Kidney Disease Foundation guidelines for Chronic Kidney Disease (CKD):     Stage 1 with normal or high GFR (GFR > 90 mL/min/1.73 square meters)    Stage 2 Mild CKD (GFR = 60-89 mL/min/1.73 square meters)    Stage 3A Moderate CKD (GFR = 45-59 mL/min/1.73 square meters)    Stage 3B Moderate CKD (GFR = 30-44 mL/min/1.73 square  meters)    Stage 4 Severe CKD (GFR = 15-29 mL/min/1.73 square meters)    Stage 5 End Stage CKD (GFR <15 mL/min/1.73 square meters)  Note: GFR calculation is accurate only with a steady state creatinine    CBC and differential [[id number]]  (Abnormal) Collected: 01/03/25 1635    Lab Status: Final result Specimen: Blood from Arm, Left Updated: 01/03/25 1644     WBC 5.14 Thousand/uL      RBC 6.28 Million/uL      Hemoglobin 17.2 g/dL      Hematocrit 53.0 %      MCV 84 fL      MCH 27.4 pg      MCHC 32.5 g/dL      RDW 13.3 %      MPV 8.7 fL      Platelets 313 Thousands/uL      nRBC 0 /100 WBCs      Segmented % 49 %      Immature Grans % 0 %      Lymphocytes % 42 %      Monocytes % 7 %      Eosinophils Relative 1 %      Basophils Relative 1 %      Absolute Neutrophils 2.51 Thousands/µL      Absolute Immature Grans 0.02 Thousand/uL      Absolute Lymphocytes 2.17 Thousands/µL      Absolute Monocytes 0.34 Thousand/µL      Eosinophils Absolute 0.07 Thousand/µL      Basophils Absolute 0.03 Thousands/µL             XR chest 1 view portable   ED Interpretation by Jeff Bhardwaj MD (01/03 1709)   nad      Final Interpretation by Darron Zambrano DO (01/03 2003)      No acute cardiopulmonary disease.            Workstation performed: [id number]             ECG 12 Lead Documentation Only    Date/Time: 1/3/2025 4:47 PM    Performed by: Jeff Bhardwaj MD  Authorized by: Jeff Bhardwaj MD    Indications / Diagnosis:  Dizziness  Patient location:  ED  Interpretation:     Interpretation: normal    Rate:     ECG rate:  93  Rhythm:     Rhythm: sinus rhythm    Ectopy:     Ectopy: none    QRS:     QRS intervals:  Normal  ST segments:     ST segments:  Normal  Comments:      Qtc nml      ED Medication and Procedure Management   Prior to Admission Medications   Prescriptions Last Dose Informant Patient Reported? Taking?   albuterol (Proventil HFA) 90 mcg/act inhaler Not Taking  No No   Sig: Inhale 2 puffs every 4 (four) hours as needed for  wheezing   Patient not taking: Reported on 1/3/2025   divalproex sodium (DEPAKOTE ER) 250 mg 24 hr tablet   No No   Sig: Take 3 tablets (750 mg total) by mouth daily   ergocalciferol (VITAMIN D2) 50,000 units Not Taking  No No   Sig: Take 1 capsule (50,000 Units total) by mouth once a week   Patient not taking: Reported on 1/3/2025   hydrOXYzine pamoate (VISTARIL) 25 mg capsule   No No   Sig: Take 1 capsule (25 mg total) by mouth 3 (three) times a day as needed for anxiety   mirtazapine (REMERON) 15 mg tablet   No No   Sig: TAKE 1 TABLET BY MOUTH EVERY NIGHT AT BEDTIME   ondansetron (ZOFRAN-ODT) 4 mg disintegrating tablet Not Taking  Yes No   Sig: Take 4 mg by mouth every 8 (eight) hours as needed   Patient not taking: Reported on 1/3/2025      Facility-Administered Medications: None     Discharge Medication List as of 1/3/2025  5:58 PM        CONTINUE these medications which have NOT CHANGED    Details   albuterol (Proventil HFA) 90 mcg/act inhaler Inhale 2 puffs every 4 (four) hours as needed for wheezing, Starting Tue 5/7/2024, Normal      divalproex sodium (DEPAKOTE ER) 250 mg 24 hr tablet Take 3 tablets (750 mg total) by mouth daily, Starting Mon 12/2/2024, Normal      ergocalciferol (VITAMIN D2) 50,000 units Take 1 capsule (50,000 Units total) by mouth once a week, Starting Mon 12/2/2024, Normal      hydrOXYzine pamoate (VISTARIL) 25 mg capsule Take 1 capsule (25 mg total) by mouth 3 (three) times a day as needed for anxiety, Starting Mon 12/2/2024, Normal      mirtazapine (REMERON) 15 mg tablet TAKE 1 TABLET BY MOUTH EVERY NIGHT AT BEDTIME, Starting Tue 12/17/2024, Normal      ondansetron (ZOFRAN-ODT) 4 mg disintegrating tablet Take 4 mg by mouth every 8 (eight) hours as needed, Starting Wed 1/1/2025, Until Sat 1/11/2025 at 2359, Historical Med           No discharge procedures on file.  ED SEPSIS DOCUMENTATION   Time reflects when diagnosis was documented in both MDM as applicable and the Disposition within  this note       Time User Action Codes Description Comment    1/3/2025  5:56 PM Jeff Bhardwaj Add [R42] Lightheadedness     1/3/2025  5:57 PM Jeff Bhardwaj Add [J11.1] Influenza                  Jeff Bhardwaj MD  01/03/25 2037

## 2025-03-04 ENCOUNTER — TELEPHONE (OUTPATIENT)
Dept: FAMILY MEDICINE CLINIC | Facility: CLINIC | Age: 35
End: 2025-03-04

## 2025-03-04 NOTE — TELEPHONE ENCOUNTER
Pt called regarding upcoming appointment insurance not accepted in office for new patient appointment need to be cancel, pt can call [facility] for appointment  at [phone]

## 2025-03-04 NOTE — LETTER
[facility] [address], [address]  [facility] [address]  Phone#  [phone]  Fax#  [phone]      March 4, 2025      Dear:   Janene Veloz         Our office has attempted to contact you several times regarding your Appointment.  Could you please contact our office at [phone].    Thank you.     Sincerely,    Mariah Soliz MA

## 2025-03-12 ENCOUNTER — TELEPHONE (OUTPATIENT)
Age: 35
End: 2025-03-12

## 2025-03-12 DIAGNOSIS — F41.1 GENERALIZED ANXIETY DISORDER WITH PANIC ATTACKS: ICD-10-CM

## 2025-03-12 DIAGNOSIS — F41.0 GENERALIZED ANXIETY DISORDER WITH PANIC ATTACKS: ICD-10-CM

## 2025-03-12 DIAGNOSIS — F31.9 BIPOLAR 1 DISORDER (HCC): Chronic | ICD-10-CM

## 2025-03-12 RX ORDER — MIRTAZAPINE 15 MG/1
15 TABLET, FILM COATED ORAL
Qty: 60 TABLET | Refills: 0 | Status: SHIPPED | OUTPATIENT
Start: 2025-03-12 | End: 2025-03-13

## 2025-03-12 RX ORDER — HYDROXYZINE PAMOATE 25 MG/1
25 CAPSULE ORAL 3 TIMES DAILY PRN
Qty: 90 CAPSULE | Refills: 2 | Status: SHIPPED | OUTPATIENT
Start: 2025-03-12 | End: 2025-03-13

## 2025-03-12 RX ORDER — DIVALPROEX SODIUM 250 MG/1
750 TABLET, FILM COATED, EXTENDED RELEASE ORAL DAILY
Qty: 90 TABLET | Refills: 2 | Status: SHIPPED | OUTPATIENT
Start: 2025-03-12

## 2025-03-12 NOTE — TELEPHONE ENCOUNTER
Medication Refill Request     Name of Medication hydrOXYzine pamoate (VISTARIL) 25 mg capsule, mirtazapine (REMERON) 15 mg tablet, divalproex sodium (DEPAKOTE ER) 250 mg 24 hr tablet   Dose/Frequency   Quantity   Verified pharmacy   [x]  Verified ordering Provider   [x]  Does patient have enough for the next 3 days? Yes [] No [x]  Does patient have a follow-up appointment scheduled? Yes [] No []   If so when is appointment: 3/13/25

## 2025-03-12 NOTE — TELEPHONE ENCOUNTER
PA for Mirtazapine 15 mg SUBMITTED to [employer]     via    [x]CMM-KEY: [id number]  []Surescripts-Case ID #   []Availity-Auth ID # NDC #   []Faxed to plan   []Other website   []Phone call Case ID #     []PA sent as URGENT    All office notes, labs and other pertaining documents and studies sent. Clinical questions answered. Awaiting determination from insurance company.     Turnaround time for your insurance to make a decision on your Prior Authorization can take 7-21 business days.

## 2025-03-13 ENCOUNTER — OFFICE VISIT (OUTPATIENT)
Dept: PSYCHIATRY | Facility: CLINIC | Age: 35
End: 2025-03-13
Payer: COMMERCIAL

## 2025-03-13 DIAGNOSIS — F31.9 BIPOLAR 1 DISORDER (HCC): Primary | ICD-10-CM

## 2025-03-13 DIAGNOSIS — F10.21 ALCOHOL USE DISORDER, SEVERE, IN SUSTAINED REMISSION (HCC): ICD-10-CM

## 2025-03-13 DIAGNOSIS — F41.0 GENERALIZED ANXIETY DISORDER WITH PANIC ATTACKS: ICD-10-CM

## 2025-03-13 DIAGNOSIS — F41.1 GENERALIZED ANXIETY DISORDER WITH PANIC ATTACKS: ICD-10-CM

## 2025-03-13 DIAGNOSIS — R41.840 CONCENTRATION DEFICIT: ICD-10-CM

## 2025-03-13 DIAGNOSIS — Z79.899 MEDICAL MARIJUANA USE: ICD-10-CM

## 2025-03-13 PROCEDURE — 90833 PSYTX W PT W E/M 30 MIN: CPT | Performed by: PSYCHIATRY & NEUROLOGY

## 2025-03-13 PROCEDURE — 99214 OFFICE O/P EST MOD 30 MIN: CPT | Performed by: PSYCHIATRY & NEUROLOGY

## 2025-03-13 RX ORDER — BUPROPION HYDROCHLORIDE 150 MG/1
150 TABLET ORAL DAILY
Qty: 30 TABLET | Refills: 2 | Status: SHIPPED | OUTPATIENT
Start: 2025-03-13

## 2025-03-13 RX ORDER — CLONIDINE HYDROCHLORIDE 0.1 MG/1
0.1 TABLET ORAL
Qty: 30 TABLET | Refills: 2 | Status: SHIPPED | OUTPATIENT
Start: 2025-03-13

## 2025-03-13 NOTE — ASSESSMENT & PLAN NOTE
Discontinue Remeron 15 mg nightly  Restart Depakote  mg nightly  Start Wellbutrin 150 mg in 2 weeks (discussed potential for hypomanic or manic symptoms if not on Depakote before starting Wellbutrin)  Orders:    buPROPion (Wellbutrin XL) 150 mg 24 hr tablet; Take 1 tablet (150 mg total) by mouth daily
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
## 2025-03-19 NOTE — PROGRESS NOTES
Name: Janene Veloz      : 1990      MRN: [id number]  Encounter Provider: Rebecca Fay Kab-Perlman, MD  Encounter Date: 3/20/2025   Encounter department: [facility]  :  Assessment & Plan  Muscle spasm  -Inciting event of carrying 40 pack water bottle on right shoulder    PLAN  -Demonstrated stretching technique  -Referral to PT with PT locations handout provided today   -Robaxin with counseling can make drowsy start at night to gauge how it makes her feel  -Consistent naproxen use with foor and adequate hydration for the next two weeks then naproxyn holiday  -Exercises for upper back pain placed in AVS   -Heat, rest, massage   -F/U one month    Orders:    Ambulatory Referral to Physical Therapy; Future    methocarbamol (ROBAXIN) 500 mg tablet; Take 1 tablet (500 mg total) by mouth 4 (four) times a day    naproxen (Naprosyn) 500 mg tablet; Take 1 tablet (500 mg total) by mouth 2 (two) times a day with meals    Abdominal pain, unspecified abdominal location  -Provided with bentyl and zofran as well as GI referral   -Previously provided with the above however never followed up with GI therefore renewed referral; was suppose to get EGD  -Hx of H. Pylori that was treated and test for cure negative    Orders:    Ambulatory Referral to Gastroenterology; Future    dicyclomine (BENTYL) 10 mg capsule; Take 1 capsule (10 mg total) by mouth 4 (four) times a day (before meals and at bedtime)    ondansetron (ZOFRAN-ODT) 4 mg disintegrating tablet; Take 1 tablet (4 mg total) by mouth every 6 (six) hours as needed for nausea or vomiting           History of Present Illness     Janene Veloz is a 34 yo female patient presenting for this first time visit with me to discuss asthma. Today she states she does not want to talk about the asthma and her primary concern is pain in her shoulders and a bad back. She states she gets muscle spasms. The only time it feels okay is when not  "wearing a bra. She states she has has been wearing sports bra for years now. Her girlfriend helps apply icy-hot on her back and ir doesn't help. Aleve back/muscle pain doesn't help either. She states she needs something for the muscle spasms. She stretches daily however doesn't hold the stretching position. While she gets intermittent muscle spasms this most recent painful episode has been present for the last week. She recently went grocery shopping one week ago prior to this recent episode beginning and picked up a 40 pack of water and held it on right shoulder. She thinks as this fits the timeline for when the pain started it is the inciting event. She is using the aleve daily 220mg two tablets daily (naprosyn sodium) for a total of 440mg.    Another concern includes abdominal discomfort for the past 3 years. History of H. Pylori that was treated no test for cure. Previously referred to GI and was scheduled to perform EGD however not done and she is requesting a GI referral today. Her father has a hx of colon cancer. Previously on bentyl, maalox, and zofran, currently only using pepto-bismol and that's not helping. She states the bentyl, maalox, and zofran worked.     Review of Systems   Constitutional:  Negative for fever.   Respiratory:  Negative for shortness of breath.    Cardiovascular:  Negative for chest pain and palpitations.   Gastrointestinal:  Positive for abdominal pain. Negative for constipation, diarrhea, nausea and vomiting.   Musculoskeletal:  Positive for back pain.   Skin:  Negative for rash.   Hematological:  Negative for adenopathy. Does not bruise/bleed easily.       Objective   /72 (BP Location: Right arm, Patient Position: Sitting, Cuff Size: Large)   Pulse 88   Temp 97.7 °F (36.5 °C) (Temporal)   Resp 16   Ht 5' 5\" (1.651 m)   Wt 108 kg (238 lb 3.2 oz)   LMP 01/26/2019 (Exact Date)   SpO2 97%   BMI 39.64 kg/m²      Physical Exam  Constitutional:       Appearance: Normal " appearance.   HENT:      Head: Normocephalic and atraumatic.   Cardiovascular:      Rate and Rhythm: Normal rate and regular rhythm.      Heart sounds: Normal heart sounds. No murmur heard.     No friction rub. No gallop.   Pulmonary:      Effort: Pulmonary effort is normal.      Breath sounds: Normal breath sounds. No wheezing, rhonchi or rales.   Abdominal:      General: Abdomen is flat. Bowel sounds are normal. There is no distension.      Palpations: Abdomen is soft. There is no mass.      Tenderness: There is no abdominal tenderness. There is no right CVA tenderness, left CVA tenderness, guarding or rebound.      Hernia: No hernia is present.   Musculoskeletal:         General: Normal range of motion.      Cervical back: Normal range of motion and neck supple. No rigidity or tenderness.      Comments: Muscle spasm of right trapezius, paraspinal muscles on right, and rhomboids on right  -Full range of motion active and passive of right shoulder   Lymphadenopathy:      Cervical: No cervical adenopathy.   Neurological:      General: No focal deficit present.      Mental Status: She is alert and oriented to person, place, and time.      Cranial Nerves: No cranial nerve deficit.      Motor: No weakness.      Gait: Gait normal.   Psychiatric:         Mood and Affect: Mood normal.         Behavior: Behavior normal.

## 2025-03-19 NOTE — TELEPHONE ENCOUNTER
PA for Mirtazapine 15MG tablets NOT REQUIRED     Reason (screenshot if applicable)      As of 3/13/25, medication has been discounted by provider.

## 2025-03-20 ENCOUNTER — OFFICE VISIT (OUTPATIENT)
Dept: FAMILY MEDICINE CLINIC | Facility: CLINIC | Age: 35
End: 2025-03-20

## 2025-03-20 VITALS
RESPIRATION RATE: 16 BRPM | DIASTOLIC BLOOD PRESSURE: 72 MMHG | OXYGEN SATURATION: 97 % | TEMPERATURE: 97.7 F | HEART RATE: 88 BPM | WEIGHT: 238.2 LBS | SYSTOLIC BLOOD PRESSURE: 110 MMHG | HEIGHT: 65 IN | BODY MASS INDEX: 39.69 KG/M2

## 2025-03-20 DIAGNOSIS — M62.838 MUSCLE SPASM: Primary | ICD-10-CM

## 2025-03-20 DIAGNOSIS — R10.9 ABDOMINAL PAIN, UNSPECIFIED ABDOMINAL LOCATION: ICD-10-CM

## 2025-03-20 PROCEDURE — 99213 OFFICE O/P EST LOW 20 MIN: CPT | Performed by: INTERNAL MEDICINE

## 2025-03-20 RX ORDER — METHOCARBAMOL 500 MG/1
500 TABLET, FILM COATED ORAL 4 TIMES DAILY
Qty: 30 TABLET | Refills: 1 | Status: SHIPPED | OUTPATIENT
Start: 2025-03-20

## 2025-03-20 RX ORDER — ONDANSETRON 4 MG/1
4 TABLET, ORALLY DISINTEGRATING ORAL EVERY 6 HOURS PRN
Qty: 20 TABLET | Refills: 1 | Status: SHIPPED | OUTPATIENT
Start: 2025-03-20

## 2025-03-20 RX ORDER — NAPROXEN 500 MG/1
500 TABLET ORAL 2 TIMES DAILY WITH MEALS
Qty: 60 TABLET | Refills: 1 | Status: SHIPPED | OUTPATIENT
Start: 2025-03-20

## 2025-03-20 RX ORDER — DICYCLOMINE HYDROCHLORIDE 10 MG/1
10 CAPSULE ORAL
Qty: 30 CAPSULE | Refills: 1 | Status: SHIPPED | OUTPATIENT
Start: 2025-03-20

## 2025-03-20 RX ORDER — HYDROXYZINE HYDROCHLORIDE 25 MG/1
25 TABLET, FILM COATED ORAL EVERY 6 HOURS PRN
COMMUNITY

## 2025-03-20 NOTE — PATIENT INSTRUCTIONS
"Patient Education     Exercises for Upper Back Pain   About this topic   Upper back pain may be felt anywhere from the base of the neck into the middle part of the back. This includes the upper or thoracic spine ? the part that would be in line with your chest. This is also the part of your back where the ribs link to the spine. The upper back is mostly for stability and works with the rib cage to protect your organs. It does not have as much movement as the neck and lower back. Exercises may help to make this problem better.  General   Before starting with a program, ask your doctor if you are healthy enough to do these exercises. Your doctor may have you work with a  or physical therapist to make a safe exercise program to meet your needs.  Stretching Exercises   Stretching exercises keep your muscles flexible. They also stop them from getting tight. Start by doing each of these stretches 2 to 3 times. In order for your body to make changes, you will need to hold these stretches for 20 to 30 seconds. Try to do the stretches 2 to 3 times each day. Do all exercises slowly.  Corner stretches:  T position ? Stand about one foot away from a corner. Bend your elbows and bring your upper arms to shoulder height. Rest your arms on the wall. Keep your back straight and gently lean forward until you feel a stretch in the front of your chest and shoulders.  V position ? Stand about one foot away from a corner. With your elbows straight, put only your hands on the wall and make a letter \"V\". Keep your back straight and gently lean forward until you feel a stretch in the front of your chest and shoulders.  Rounded back stretches ? Start in the all fours position. Tuck your chin and tighten your stomach muscles to round your back.  Midback rotations ? Start on all fours. Walk your hands to one side until you feel a good stretch on the opposite side. Then, walk your hands to the other side and hold. Now, start by sitting " back on your heels. Walk your hands to one side until you feel a good stretch on the opposite side. Then, walk your hands to the other side and hold. You should feel this stretch in a slightly different area than when on all fours.  Strengthening Exercises   Strengthening exercises keep your muscles firm and strong. Stand or sit up tall on a chair without arms for your exercises. Start by repeating each exercise 2 to 3 times. Work up to doing each exercise 10 times. Try to do the exercises 2 to 3 times each day. Do all exercises slowly.  Shoulder blade squeezes ? Pinch your shoulder blades together on your upper back and hold 3 to 5 seconds. Relax.  Arm and leg lifts on hands and knees ? Start on your hands and knees. With all of these exercises, keep your back as level as possible. If you are having trouble with this, you may want to put a small object on your back, such as a book. If it falls off, you are not keeping your back level enough during the exercise.  Lift one arm up to shoulder level and hold. Lower it back down. Now, lift up the other arm and hold.  Lift one leg up to hip level and hold. Lower it back down. Now, lift up the other leg and hold.  Lift one arm and the OPPOSITE leg up at the same time and hold. Lower them down. Now, repeat using the other arm and leg. This is a very hard exercise. It may take time to be able to do this.  Shoulder blade exercises ? Lie on your stomach near the edge of a mat, bed, or supported on an exercise ball. Start with your arms hanging down in a relaxed position.  Y position ? Raise your arms up and to the sides so your elbows are near your ears and your arms are straight out diagonally like the letter Y. Lower the arms back to the starting position.  T position ? Raise your arms straight out to the sides, even with your shoulders. Lower the arms back to the starting position.  W position ? Raise your arms up and to the sides with your elbows bent. Your hands should  be just above your shoulders and looks like a W from above. Lower the arms back to the starting position.  L position ? Keep your elbows at your sides and raise just your lower arms out to the side to make a letter L and a backwards letter L. Lower the arms back to the starting position.               What will the results be?   Reduce pain  Improve flexibility  Improve motion  Improve strength  Improve body posture  Lower stress  Prevent re-injury  Helpful tips   Stay active and work out to keep your muscles strong and flexible.  Keep a healthy weight so there is not extra stress on your joints. Eat a healthy diet to keep your muscles healthy.  Be sure you do not hold your breath when exercising. This can raise your blood pressure. If you tend to hold your breath, try counting out loud when exercising. If any exercise bothers you, stop right away.  Always warm up before stretching. Heated muscles stretch much easier than cool muscles. Stretching cool muscles can lead to injury.  Try walking or cycling at an easy pace for a few minutes to warm up your muscles. Do this again after exercising.  Never bounce when doing stretches.  After exercising, it is a good idea to use ice. Place an ice pack or a bag of frozen peas wrapped in a towel over the painful part. Never put ice right on the skin. Do not leave the ice on more than 10 to 15 minutes at a time. Ice after activity may help decrease pain and swelling. Never ice before stretching.  Doing exercises before a meal may be a good way to get into a routine.  Exercise may be slightly uncomfortable, but you should not have sharp pains. If you do get sharp pains, stop what you are doing. If the sharp pains continue, call your doctor.  Last Reviewed Date   2020-03-10  Consumer Information Use and Disclaimer   This generalized information is a limited summary of diagnosis, treatment, and/or medication information. It is not meant to be comprehensive and should be used as a  tool to help the user understand and/or assess potential diagnostic and treatment options. It does NOT include all information about conditions, treatments, medications, side effects, or risks that may apply to a specific patient. It is not intended to be medical advice or a substitute for the medical advice, diagnosis, or treatment of a health care provider based on the health care provider's examination and assessment of a patient’s specific and unique circumstances. Patients must speak with a health care provider for complete information about their health, medical questions, and treatment options, including any risks or benefits regarding use of medications. This information does not endorse any treatments or medications as safe, effective, or approved for treating a specific patient. [employer] and its affiliates disclaim any warranty or liability relating to this information or the use thereof. The use of this information is governed by the Terms of Use, available at https://www.[employer].com/en/know/clinical-effectiveness-terms   Copyright   Copyright © 2024 [employer] and its affiliates and/or licensors. All rights reserved.

## 2025-05-14 ENCOUNTER — TELEPHONE (OUTPATIENT)
Dept: FAMILY MEDICINE CLINIC | Facility: CLINIC | Age: 35
End: 2025-05-14

## 2025-05-14 NOTE — TELEPHONE ENCOUNTER
Called pt to cancel barbra with  due to pt scheduling barbra in the wrong office unable to contact pt LVM

## 2025-08-15 ENCOUNTER — OFFICE VISIT (OUTPATIENT)
Dept: FAMILY MEDICINE CLINIC | Facility: CLINIC | Age: 35
End: 2025-08-15

## 2025-08-15 PROBLEM — N62 LARGE BREASTS: Status: ACTIVE | Noted: 2025-08-15

## (undated) DEVICE — ABDOMINAL PAD: Brand: DERMACEA

## (undated) DEVICE — GLOVE INDICATOR PI UNDERGLOVE SZ 6.5 BLUE

## (undated) DEVICE — SUT VICRYL 0 REEL 54 IN J287G

## (undated) DEVICE — TELFA NON-ADHERENT ABSORBENT DRESSING: Brand: TELFA

## (undated) DEVICE — Device

## (undated) DEVICE — ASTOUND STANDARD SURGICAL GOWN, XL: Brand: CONVERTORS

## (undated) DEVICE — 2000CC GUARDIAN II: Brand: GUARDIAN

## (undated) DEVICE — ADHESIVE SKN CLSR HISTOACRYL FLEX 0.5ML LF

## (undated) DEVICE — DRAPE EQUIPMENT RF WAND

## (undated) DEVICE — CHLORAPREP HI-LITE 26ML ORANGE

## (undated) DEVICE — GLOVE INDICATOR PI UNDERGLOVE SZ 7.5 BLUE

## (undated) DEVICE — PREMIUM DRY TRAY LF: Brand: MEDLINE INDUSTRIES, INC.

## (undated) DEVICE — DRAPE LAPAROTOMY W/POUCHES

## (undated) DEVICE — GLOVE PI ULTRA TOUCH SZ.7.0

## (undated) DEVICE — BETHLEHEM UNIVERSAL LAPAROTOMY: Brand: CARDINAL HEALTH

## (undated) DEVICE — SUT VICRYL 0 CT-1 36 IN J946H

## (undated) DEVICE — PROXIMATE SKIN STAPLERS (35 WIDE) CONTAINS 35 STAINLESS STEEL STAPLES (FIXED HEAD): Brand: PROXIMATE

## (undated) DEVICE — WOUND RETRACTOR AND PROTECTOR: Brand: ALEXIS O WOUND PROTECTOR-RETRACTOR

## (undated) DEVICE — GLOVE PI ULTRA TOUCH SZ.6.5

## (undated) DEVICE — 3M™ TEGADERM™ TRANSPARENT FILM DRESSING FRAME STYLE, 1627, 4 IN X 10 IN (10 CM X 25 CM), 20/CT 4CT/CASE: Brand: 3M™ TEGADERM™

## (undated) DEVICE — GLOVE INDICATOR PI UNDERGLOVE SZ 7 BLUE

## (undated) DEVICE — SUT MONOCRYL 3-0 CT-1 27 IN Y338H

## (undated) DEVICE — SUT PDS PLUS 1 TP-1 96IN PDP880G

## (undated) DEVICE — INTENDED FOR TISSUE SEPARATION, AND OTHER PROCEDURES THAT REQUIRE A SHARP SURGICAL BLADE TO PUNCTURE OR CUT.: Brand: BARD-PARKER SAFETY BLADES SIZE 15, STERILE

## (undated) DEVICE — TRAY FOLEY 16FR URIMETER SILICONE SURESTEP

## (undated) DEVICE — SUT MONOCRYL 4-0 PS-2 27 IN Y426H

## (undated) DEVICE — SCD SEQUENTIAL COMPRESSION COMFORT SLEEVE MEDIUM KNEE LENGTH: Brand: KENDALL SCD

## (undated) DEVICE — DRAPE FLUID WARMER (BIRD BATH)

## (undated) DEVICE — PENCIL ELECTROSURG E-Z CLEAN -0035H

## (undated) DEVICE — INTENDED FOR TISSUE SEPARATION, AND OTHER PROCEDURES THAT REQUIRE A SHARP SURGICAL BLADE TO PUNCTURE OR CUT.: Brand: BARD-PARKER SAFETY BLADES SIZE 10, STERILE